# Patient Record
Sex: FEMALE | Race: WHITE | NOT HISPANIC OR LATINO | Employment: UNEMPLOYED
[De-identification: names, ages, dates, MRNs, and addresses within clinical notes are randomized per-mention and may not be internally consistent; named-entity substitution may affect disease eponyms.]

---

## 2017-09-01 ENCOUNTER — HEALTH MAINTENANCE LETTER (OUTPATIENT)
Age: 52
End: 2017-09-01

## 2018-03-13 ENCOUNTER — OFFICE VISIT (OUTPATIENT)
Dept: FAMILY MEDICINE | Facility: CLINIC | Age: 53
End: 2018-03-13
Payer: COMMERCIAL

## 2018-03-13 VITALS
SYSTOLIC BLOOD PRESSURE: 130 MMHG | DIASTOLIC BLOOD PRESSURE: 82 MMHG | BODY MASS INDEX: 32.14 KG/M2 | HEART RATE: 79 BPM | RESPIRATION RATE: 14 BRPM | OXYGEN SATURATION: 99 % | HEIGHT: 66 IN | TEMPERATURE: 97 F | WEIGHT: 200 LBS

## 2018-03-13 DIAGNOSIS — Z86.2 HISTORY OF IRON DEFICIENCY ANEMIA: ICD-10-CM

## 2018-03-13 DIAGNOSIS — Z80.0 FAMILY HISTORY OF COLON CANCER: ICD-10-CM

## 2018-03-13 DIAGNOSIS — Z00.00 ROUTINE GENERAL MEDICAL EXAMINATION AT A HEALTH CARE FACILITY: Primary | ICD-10-CM

## 2018-03-13 DIAGNOSIS — Z12.4 SCREENING FOR MALIGNANT NEOPLASM OF CERVIX: ICD-10-CM

## 2018-03-13 DIAGNOSIS — F17.200 TOBACCO USE DISORDER: ICD-10-CM

## 2018-03-13 DIAGNOSIS — Z12.11 SCREEN FOR COLON CANCER: ICD-10-CM

## 2018-03-13 DIAGNOSIS — F10.10 ALCOHOL ABUSE, EPISODIC DRINKING BEHAVIOR: ICD-10-CM

## 2018-03-13 DIAGNOSIS — Z23 NEED FOR PROPHYLACTIC VACCINATION AND INOCULATION AGAINST INFLUENZA: ICD-10-CM

## 2018-03-13 DIAGNOSIS — Z12.31 VISIT FOR SCREENING MAMMOGRAM: ICD-10-CM

## 2018-03-13 LAB
CHOLEST SERPL-MCNC: 257 MG/DL
FERRITIN SERPL-MCNC: 106 NG/ML (ref 8–252)
GLUCOSE SERPL-MCNC: 98 MG/DL (ref 70–99)
HDLC SERPL-MCNC: 56 MG/DL
LDLC SERPL CALC-MCNC: 164 MG/DL
NONHDLC SERPL-MCNC: 201 MG/DL
TRIGL SERPL-MCNC: 183 MG/DL

## 2018-03-13 PROCEDURE — 80061 LIPID PANEL: CPT | Performed by: FAMILY MEDICINE

## 2018-03-13 PROCEDURE — G0145 SCR C/V CYTO,THINLAYER,RESCR: HCPCS | Performed by: FAMILY MEDICINE

## 2018-03-13 PROCEDURE — 82947 ASSAY GLUCOSE BLOOD QUANT: CPT | Performed by: FAMILY MEDICINE

## 2018-03-13 PROCEDURE — 99396 PREV VISIT EST AGE 40-64: CPT | Performed by: FAMILY MEDICINE

## 2018-03-13 PROCEDURE — 90471 IMMUNIZATION ADMIN: CPT | Performed by: FAMILY MEDICINE

## 2018-03-13 PROCEDURE — 82728 ASSAY OF FERRITIN: CPT | Performed by: FAMILY MEDICINE

## 2018-03-13 PROCEDURE — 90686 IIV4 VACC NO PRSV 0.5 ML IM: CPT | Performed by: FAMILY MEDICINE

## 2018-03-13 PROCEDURE — 36415 COLL VENOUS BLD VENIPUNCTURE: CPT | Performed by: FAMILY MEDICINE

## 2018-03-13 PROCEDURE — G0476 HPV COMBO ASSAY CA SCREEN: HCPCS | Performed by: FAMILY MEDICINE

## 2018-03-13 PROCEDURE — 87624 HPV HI-RISK TYP POOLED RSLT: CPT | Performed by: FAMILY MEDICINE

## 2018-03-13 NOTE — PROGRESS NOTES

## 2018-03-13 NOTE — PROGRESS NOTES
SUBJECTIVE:   CC: Demetria Goodrich is an 52 year old woman who presents for preventive health visit.     Physical   Annual:     Getting at least 3 servings of Calcium per day::  Yes    Bi-annual eye exam::  NO    Dental care twice a year::  NO    Sleep apnea or symptoms of sleep apnea::  None    Frequency of exercise::  2-3 days/week    Duration of exercise::  30-45 minutes    Taking medications regularly::  Yes    Medication side effects::  None    Additional concerns today::  No              Letter stating that patient does not take Iron pills, so she and donate plasma and or blood    Today's PHQ-2 Score:   PHQ-2 ( 1999 [employer]) 3/13/2018   Q1: Little interest or pleasure in doing things 0   Q2: Feeling down, depressed or hopeless 0   PHQ-2 Score 0   Q1: Little interest or pleasure in doing things Not at all   Q2: Feeling down, depressed or hopeless Not at all   PHQ-2 Score 0       Abuse: Current or Past(Physical, Sexual or Emotional)- No  Do you feel safe in your environment - Yes    Social History   Substance Use Topics     Smoking status: Current Every Day Smoker     Packs/day: 0.30     Years: 33.00     Types: Cigarettes, Other     Smokeless tobacco: Former User     Quit date: 3/13/2015      Comment: 1 to 2 Cigarettes per day     Alcohol use Yes      Comment: 12 pack per week     Alcohol Use 3/13/2018   AUDIT SCORE  11     AUDIT - Alcohol Use Disorders Identification Test - Reproduced from the World Health Organization Audit 2001 (Second Edition) 3/13/2018   1.  How often do you have a drink containing alcohol? 2 to 4 times a month   2.  How many drinks containing alcohol do you have on a typical day when you are drinking? 5 or 6   3.  How often do you have five or more drinks on one occasion? Monthly   4.  How often during the last year have you found that you were not able to stop drinking once you had started? Never   5.  How often during the last year have you failed to do what was normally expected of  you because of drinking? Less than monthly   6.  How often during the last year have you needed a first drink in the morning to get yourself going after a heavy drinking session? Never   7.  How often during the last year have you had a feeling of guilt or remorse after drinking? Less than monthly   8.  How often during the last year have you been unable to remember what happened the night before because of your drinking? Less than monthly   9.  Have you or someone else been injured because of your drinking? No   10. Has a relative, friend, doctor or other health care worker been concerned about your drinking or suggested you cut down? Yes, but not in the last year   TOTAL SCORE 11       Reviewed orders with patient.  Reviewed health maintenance and updated orders accordingly - Yes  Labs reviewed in EPIC  BP Readings from Last 3 Encounters:   18 130/82   13 113/74   12 138/85    Wt Readings from Last 3 Encounters:   18 200 lb (90.7 kg)   13 184 lb (83.5 kg)   12 173 lb (78.5 kg)                  Patient Active Problem List   Diagnosis     Other and unspecified alcohol dependence, unspecified drinking behavior     Hyperlipidemia LDL goal <160     Nutritional anemia     Family history of colon cancer     Chronic hypomanic disorder     Tobacco use disorder     ASCUS on Pap smear     Fibroids     Menorrhagia     Alcohol abuse, episodic drinking behavior     Past Surgical History:   Procedure Laterality Date     LIGATN/STRIP LONG & SHORT SAPHEN  +1995    both legs       Social History   Substance Use Topics     Smoking status: Current Every Day Smoker     Packs/day: 0.30     Years: 33.00     Types: Cigarettes, Other     Smokeless tobacco: Former User     Quit date: 3/13/2015      Comment: 1 to 2 Cigarettes per day     Alcohol use Yes      Comment: 12 pack per week     Family History   Problem Relation Age of Onset     CANCER Father      Brain tumor, age 32,  age 33     Cancer -  colorectal Mother      54     Other Cancer Mother      Cancer - colorectal Maternal Uncle      56     Breast Cancer No family hx of      Ovarian Cancer No family hx of          No current outpatient prescriptions on file.     Allergies   Allergen Reactions     No Known Drug Allergies      Recent Labs   Lab Test  09/28/12   1059  10/01/11   1759  03/10/11   0937  03/16/10   1001   LDL  184*   --   163*  173*   HDL  53   --   52  58   TRIG  69   --   72  84   ALT  21   --   16  10   CR  0.77  0.72  0.81  0.74   GFRESTIMATED  81  88  77  86   GFRESTBLACK  >90  >90  >90  >90   POTASSIUM  4.4  3.8  4.1  3.9   TSH  1.37   --    --   3.05        Patient over age 50, mutual decision to screen reflected in health maintenance.    Pertinent mammograms are reviewed under the imaging tab.  History of abnormal Pap smear: YES - updated in Problem List and Health Maintenance accordingly    Reviewed and updated as needed this visit by clinical staff  Tobacco  Allergies  Meds  Problems  Med Hx  Surg Hx  Fam Hx  Soc Hx          Reviewed and updated as needed this visit by Provider  Tobacco  Allergies  Meds  Problems  Med Hx  Surg Hx  Soc Hx       Past Medical History:   Diagnosis Date     ASCUS on Pap smear 2010     Chronic hypomanic disorder      Other and unspecified alcohol dependence, unspecified drinking behavior      Varicose veins of lower extremities with other complications     both legs      Past Surgical History:   Procedure Laterality Date     LIGATN/STRIP LONG & SHORT SAPHEN  1990+1995    both legs       Review of Systems  C: NEGATIVE for fever, chills, change in weight  I: NEGATIVE for worrisome rashes, moles or lesions  E: NEGATIVE for vision changes or irritation  ENT: NEGATIVE for ear, mouth and throat problems  R: NEGATIVE for significant cough or SOB  B: NEGATIVE for masses, tenderness or discharge  CV: NEGATIVE for chest pain, palpitations or peripheral edema  GI: NEGATIVE for nausea, abdominal pain,  "heartburn, or change in bowel habits  : NEGATIVE for unusual urinary or vaginal symptoms. No vaginal bleeding.  M: NEGATIVE for significant arthralgias or myalgia  N: NEGATIVE for weakness, dizziness or paresthesias  P: NEGATIVE for changes in mood or affect      OBJECTIVE:   /82  Pulse 79  Temp 97  F (36.1  C)  Resp 14  Ht 5' 6\" (1.676 m)  Wt 200 lb (90.7 kg)  LMP 11/30/2012  SpO2 99%  BMI 32.28 kg/m2  Physical Exam  GENERAL APPEARANCE: healthy, alert and no distress  EYES: Eyes grossly normal to inspection, PERRL and conjunctivae and sclerae normal  HENT: ear canals and TM's normal, nose and mouth without ulcers or lesions, oropharynx clear and oral mucous membranes moist  NECK: no adenopathy, no asymmetry, masses, or scars and thyroid normal to palpation  RESP: lungs clear to auscultation - no rales, rhonchi or wheezes  BREAST: normal without masses, tenderness or nipple discharge and no palpable axillary masses or adenopathy  CV: regular rate and rhythm, normal S1 S2, no S3 or S4, no murmur, click or rub, no peripheral edema and peripheral pulses strong  ABDOMEN: soft, nontender, no hepatosplenomegaly, no masses and bowel sounds normal   (female): normal female external genitalia, normal urethral meatus, vaginal mucosal atrophy noted, normal cervix, adnexae, and uterus without masses or abnormal discharge  MS: no musculoskeletal defects are noted and gait is age appropriate without ataxia  SKIN: no suspicious lesions or rashes  NEURO: Normal strength and tone, sensory exam grossly normal, mentation intact and speech normal  PSYCH: mentation appears normal and affect normal/bright    ASSESSMENT/PLAN:   1. Routine general medical examination at a health care facility  Normal   - GLUCOSE  - Lipid panel reflex to direct LDL Fasting    2. Alcohol abuse, episodic drinking behavior  Discussed No more than  1-2 Beers a day    3. Tobacco use disorder  Pt says she is now on E-cigs and planning to " "quit    4. History of iron deficiency anemia  In the past when she had menorrhagia  - Ferritin    5. Screen for colon cancer  Discussed Importance of colonoscopy due to her Family History colon ca  Pt says she will schedule  - GASTROENTEROLOGY ADULT REF PROCEDURE ONLY    6. Visit for screening mammogram  Advised   - MA SCREENING DIGITAL BILAT - Future  (s+30); Future    7. Screening for malignant neoplasm of cervix  done  - Pap imaged thin layer screen with HPV - recommended age 30 - 65 years (select HPV order below)  - HPV High Risk Types DNA Cervical    8. Family history of colon cancer  Referral  done for colonoscopy      COUNSELING:  Reviewed preventive health counseling, as reflected in patient instructions       Regular exercise       Healthy diet/nutrition       Vision screening       Hearing screening       Immunizations    Vaccinated for: Influenza             Osteoporosis Prevention/Bone Health       Colon cancer screening       The ASCVD Risk score (Hollistoncharli ABREU Jr, et al., 2013) failed to calculate for the following reasons:    Cannot find a previous HDL lab    Cannot find a previous total cholesterol lab         reports that she has been smoking Cigarettes and Other.  She has a 9.90 pack-year smoking history. She quit smokeless tobacco use about 3 years ago.  Tobacco Cessation Action Plan: Information offered: Patient not interested at this time  Estimated body mass index is 32.28 kg/(m^2) as calculated from the following:    Height as of this encounter: 5' 6\" (1.676 m).    Weight as of this encounter: 200 lb (90.7 kg).   Weight management plan: low madina diet/Exercise    Counseling Resources:  ATP IV Guidelines  Pooled Cohorts Equation Calculator  Breast Cancer Risk Calculator  FRAX Risk Assessment  ICSI Preventive Guidelines  Dietary Guidelines for Americans, 2010  USDA's MyPlate  ASA Prophylaxis  Lung CA Screening    Teresa Carrasquillo MD  [facility]  Answers for HPI/ROS submitted by the patient on " 3/13/2018   PHQ-2 Score: 0

## 2018-03-13 NOTE — PATIENT INSTRUCTIONS
[facility]    If you have any questions regarding to your visit please contact your care team:       Team Red:   Clinic Hours Telephone Number   Dr. Xenia Renteria  (pediatrics)  Ana María De La Fuente NP 7am-7pm  Monday - Thursday   7am-5pm  Fridays  [phone] (fax)    Hiwot RODRIGUEZ  [phone]   Urgent Care - [place] and [place] Monday-Friday  [place] - 11am-8pm  Saturday-Sunday  Both sites - 9am-5pm  [phone] - [facility]  [phone] - [place]       What options do I have for visits at the clinic other than the traditional office visit?  To expand how we care for you, many of our providers are utilizing electronic visits (e-visits) and telephone visits, when medically appropriate, for interactions with their patients rather than a visit in the clinic.   We also offer nurse visits for many medical concerns. Just like any other service, we will bill your insurance company for this type of visit based on time spent on the phone with your provider. Not all insurance companies cover these visits. Please check with your medical insurance if this type of visit is covered. You will be responsible for any charges that are not paid by your insurance.      E-visits via EoeMobile:  generally incur a $35.00 fee.  Telephone visits:  Time spent on the phone: *charged based on time that is spent on the phone in increments of 10 minutes. Estimated cost:   5-10 mins $30.00   11-20 mins. $59.00   21-30 mins. $85.00     As always, Thank you for trusting us with your health care needs!                Preventive Health Recommendations  Female Ages 50 - 64    Yearly exam: See your health care provider every year in order to  o Review health changes.   o Discuss preventive care.    o Review your medicines if your doctor has prescribed any.      Get a Pap test every three years (unless you have an abnormal result and your provider advises testing more  often).    If you get Pap tests with HPV test, you only need to test every 5 years, unless you have an abnormal result.     You do not need a Pap test if your uterus was removed (hysterectomy) and you have not had cancer.    You should be tested each year for STDs (sexually transmitted diseases) if you're at risk.     Have a mammogram every 1 to 2 years.    Have a colonoscopy at age 50, or have a yearly FIT test (stool test). These exams screen for colon cancer.      Have a cholesterol test every 5 years, or more often if advised.    Have a diabetes test (fasting glucose) every three years. If you are at risk for diabetes, you should have this test more often.     If you are at risk for osteoporosis (brittle bone disease), think about having a bone density scan (DEXA).    Shots: Get a flu shot each year. Get a tetanus shot every 10 years.    Nutrition:     Eat at least 5 servings of fruits and vegetables each day.    Eat whole-grain bread, whole-wheat pasta and brown rice instead of white grains and rice.    Talk to your provider about Calcium and Vitamin D.     Lifestyle    Exercise at least 150 minutes a week (30 minutes a day, 5 days a week). This will help you control your weight and prevent disease.    Limit alcohol to one drink per day.    No smoking.     Wear sunscreen to prevent skin cancer.     See your dentist every six months for an exam and cleaning.    See your eye doctor every 1 to 2 years.        Please schedule Mammogram and Colonoscopy.  It is very Important that you get these test done  Take care  Teresa Carrasquillo MD    Discharge DILLAN Riggs CMA

## 2018-03-13 NOTE — LETTER
[facility]  [address] [address]    March 14, 2018    Demetria Goodrich  [address] [address]      Dear Demetria,    Iron level is normal. Your cholesterol is high. I am sending a low cholesterol diet. Exercise. Repeat test in 6 months.    Enclosed is a copy of your results.  Results for orders placed or performed in visit on 03/13/18   GLUCOSE   Result Value Ref Range    Glucose 98 70 - 99 mg/dL   Lipid panel reflex to direct LDL Fasting   Result Value Ref Range    Cholesterol 257 (H) <200 mg/dL    Triglycerides 183 (H) <150 mg/dL    HDL Cholesterol 56 >49 mg/dL    LDL Cholesterol Calculated 164 (H) <100 mg/dL    Non HDL Cholesterol 201 (H) <130 mg/dL   Ferritin   Result Value Ref Range    Ferritin 106 8 - 252 ng/mL   If you have any questions or concerns, please call myself or my nurse at [phone].      Sincerely,        Teresa Carrasquillo MD/cleopatra

## 2018-03-13 NOTE — NURSING NOTE
"Chief Complaint   Patient presents with     Physical       Initial /82  Pulse 79  Temp 97  F (36.1  C)  Resp 14  Ht 5' 6\" (1.676 m)  Wt 200 lb (90.7 kg)  LMP 11/30/2012  SpO2 99%  BMI 32.28 kg/m2 Estimated body mass index is 32.28 kg/(m^2) as calculated from the following:    Height as of this encounter: 5' 6\" (1.676 m).    Weight as of this encounter: 200 lb (90.7 kg).  Medication Reconciliation: complete     Lashaun Riggs. MA      "

## 2018-03-13 NOTE — MR AVS SNAPSHOT
After Visit Summary   3/13/2018    Demetria Goodrich    MRN: [id number]           Patient Information     Date Of Birth          1965        Visit Information        Provider Department      3/13/2018 12:10 PM Teresa Carrasquillo MD [facility]        Today's Diagnoses     History of iron deficiency anemia    -  1    Routine general medical examination at a health care facility        Screen for colon cancer        Visit for screening mammogram        Screening for malignant neoplasm of cervix        Family history of colon cancer        Tobacco use disorder          Care Instructions    [facility]    If you have any questions regarding to your visit please contact your care team:       Team Red:   Clinic Hours Telephone Number   Dr. Xenia Renteria  (pediatrics)  Ana María De La Fuente NP 7am-7pm  Monday - Thursday   7am-5pm  Fridays  [phone] (fax)    Hiowt RODRIGUEZ  [phone]   Urgent Care - [place] and [place] Monday-Friday  [place] - 11am-8pm  Saturday-Sunday  Both sites - 9am-5pm  [phone] - [facility]  [phone] - [place]       What options do I have for visits at the clinic other than the traditional office visit?  To expand how we care for you, many of our providers are utilizing electronic visits (e-visits) and telephone visits, when medically appropriate, for interactions with their patients rather than a visit in the clinic.   We also offer nurse visits for many medical concerns. Just like any other service, we will bill your insurance company for this type of visit based on time spent on the phone with your provider. Not all insurance companies cover these visits. Please check with your medical insurance if this type of visit is covered. You will be responsible for any charges that are not paid by your insurance.      E-visits via [employer]:  generally incur a $35.00 fee.  Telephone visits:  Time  spent on the phone: *charged based on time that is spent on the phone in increments of 10 minutes. Estimated cost:   5-10 mins $30.00   11-20 mins. $59.00   21-30 mins. $85.00     As always, Thank you for trusting us with your health care needs!                Preventive Health Recommendations  Female Ages 50 - 64    Yearly exam: See your health care provider every year in order to  o Review health changes.   o Discuss preventive care.    o Review your medicines if your doctor has prescribed any.      Get a Pap test every three years (unless you have an abnormal result and your provider advises testing more often).    If you get Pap tests with HPV test, you only need to test every 5 years, unless you have an abnormal result.     You do not need a Pap test if your uterus was removed (hysterectomy) and you have not had cancer.    You should be tested each year for STDs (sexually transmitted diseases) if you're at risk.     Have a mammogram every 1 to 2 years.    Have a colonoscopy at age 50, or have a yearly FIT test (stool test). These exams screen for colon cancer.      Have a cholesterol test every 5 years, or more often if advised.    Have a diabetes test (fasting glucose) every three years. If you are at risk for diabetes, you should have this test more often.     If you are at risk for osteoporosis (brittle bone disease), think about having a bone density scan (DEXA).    Shots: Get a flu shot each year. Get a tetanus shot every 10 years.    Nutrition:     Eat at least 5 servings of fruits and vegetables each day.    Eat whole-grain bread, whole-wheat pasta and brown rice instead of white grains and rice.    Talk to your provider about Calcium and Vitamin D.     Lifestyle    Exercise at least 150 minutes a week (30 minutes a day, 5 days a week). This will help you control your weight and prevent disease.    Limit alcohol to one drink per day.    No smoking.     Wear sunscreen to prevent skin cancer.     See your  dentist every six months for an exam and cleaning.    See your eye doctor every 1 to 2 years.        Please schedule Mammogram and Colonoscopy.  It is very Important that you get these test done  Take care  Teresa Riggs CMA            Follow-ups after your visit        Additional Services     GASTROENTEROLOGY ADULT REF PROCEDURE ONLY       Last Lab Result: Creatinine (mg/dL)       Date                     Value                 09/28/2012               0.77             ----------  Body mass index is 32.28 kg/(m^2).     Needed:  No  Language:  English    Patient will be contacted to schedule procedure.     Please be aware that coverage of these services is subject to the terms and limitations of your health insurance plan.  Call member services at your health plan with any benefit or coverage questions.  Any procedures must be performed at a [facility] facility OR coordinated by your clinic's referral office.    Please bring the following with you to your appointment:    (1) Any X-Rays, CTs or MRIs which have been performed.  Contact the facility where they were done to arrange for  prior to your scheduled appointment.    (2) List of current medications   (3) This referral request   (4) Any documents/labs given to you for this referral                  Future tests that were ordered for you today     Open Future Orders        Priority Expected Expires Ordered    MA SCREENING DIGITAL BILAT - Future  (s+30) Routine  3/13/2019 3/13/2018            Who to contact     If you have questions or need follow up information about today's clinic visit or your schedule please contact [facility] directly at [phone].  Normal or non-critical lab and imaging results will be communicated to you by MyChart, letter or phone within 4 business days after the clinic has received the results. If you do not hear from us within 7 days, please contact the clinic through  "[employer] or phone. If you have a critical or abnormal lab result, we will notify you by phone as soon as possible.  Submit refill requests through ZhongS[employer] or call your pharmacy and they will forward the refill request to us. Please allow 3 business days for your refill to be completed.          Additional Information About Your Visit        [employer] [facility] Medical Information     ZhongSou lets you send messages to your doctor, view your test results, renew your prescriptions, schedule appointments and more. To sign up, go to [email or web address] . Click on \"Log in\" on the left side of the screen, which will take you to the Welcome page. Then click on \"Sign up Now\" on the right side of the page.     You will be asked to enter the access code listed below, as well as some personal information. Please follow the directions to create your username and password.     Your access code is: [email or web address]  Expires: 2018 12:41 PM     Your access code will  in 90 days. If you need help or a new code, please call your [facility] clinic or [phone].        Care EveryWhere ID     This is your Care EveryWhere ID. This could be used by other organizations to access your [facility] medical records  [id number]        Your Vitals Were     Pulse Temperature Respirations Height Last Period Pulse Oximetry    79 97  F (36.1  C) 14 5' 6\" (1.676 m) 2012 99%    BMI (Body Mass Index)                   32.28 kg/m2            Blood Pressure from Last 3 Encounters:   18 130/82   13 113/74   12 138/85    Weight from Last 3 Encounters:   18 200 lb (90.7 kg)   13 184 lb (83.5 kg)   12 173 lb (78.5 kg)              We Performed the Following     Ferritin     GASTROENTEROLOGY ADULT REF PROCEDURE ONLY     GLUCOSE     HPV High Risk Types DNA Cervical     Lipid panel reflex to direct LDL Fasting     Pap imaged thin layer screen with HPV - recommended age 30 - 65 years (select HPV order below)        Primary Care " Provider Office Phone # Fax     Melecio Barreto Harjinder Goodrich [phone] [address] [address]        Equal Access to Services     NIKOS CHIU : Hadii aad ku hadlarryo Soomaali, waaxda luqadaha, qaybta kaalmada adeegyada, obey cruz laAlissonnora raffaele. So [facility] [phone].    ATENCIÓN: Si habla español, tiene a tabares disposición servicios gratuitos de asistencia lingüística. Llame al [phone].    We comply with applicable federal civil rights laws and [place] laws. We do not discriminate on the basis of race, color, national origin, age, disability, sex, sexual orientation, or gender identity.            Thank you!     Thank you for choosing [facility]  for your care. Our goal is always to provide you with excellent care. Hearing back from our patients is one way we can continue to improve our services. Please take a few minutes to complete the written survey that you may receive in the mail after your visit with us. Thank you!             Your Updated Medication List - Protect others around you: Learn how to safely use, store and throw away your medicines at www.disposemymeds.org.      Notice  As of 3/13/2018 12:41 PM    You have not been prescribed any medications.

## 2018-03-15 LAB
COPATH REPORT: NORMAL
PAP: NORMAL

## 2018-03-19 LAB
FINAL DIAGNOSIS: NORMAL
HPV HR 12 DNA CVX QL NAA+PROBE: NEGATIVE
HPV16 DNA SPEC QL NAA+PROBE: NEGATIVE
HPV18 DNA SPEC QL NAA+PROBE: NEGATIVE
SPECIMEN DESCRIPTION: NORMAL
SPECIMEN SOURCE CVX/VAG CYTO: NORMAL

## 2018-04-13 ENCOUNTER — RADIANT APPOINTMENT (OUTPATIENT)
Dept: MAMMOGRAPHY | Facility: CLINIC | Age: 53
End: 2018-04-13
Attending: FAMILY MEDICINE
Payer: COMMERCIAL

## 2018-04-13 DIAGNOSIS — Z12.31 VISIT FOR SCREENING MAMMOGRAM: ICD-10-CM

## 2018-04-13 PROCEDURE — 77067 SCR MAMMO BI INCL CAD: CPT | Mod: TC

## 2018-06-21 ENCOUNTER — NURSE TRIAGE (OUTPATIENT)
Dept: NURSING | Facility: CLINIC | Age: 53
End: 2018-06-21

## 2018-06-21 NOTE — TELEPHONE ENCOUNTER
Additional Information    Negative: Severe difficulty breathing (e.g., struggling for each breath, speaks in single words)    Negative: Sounds like a life-threatening emergency to the triager    Negative: Runny nose is caused by pollen or other allergies    Negative: Cough is main symptom    Negative: Severe sore throat    Negative: Fever > 104 F (40 C)    Negative: [1] Difficulty breathing AND [2] not severe AND [3] not from stuffy nose (e.g., not relieved by cleaning out the nose)    Negative: Patient sounds very sick or weak to the triager    Negative: [1] Fever > 101 F (38.3 C) AND [2] age > 60    Negative: [1] Fever > 101 F (38.3 C) AND [2] bedridden (e.g., nursing home patient, CVA, chronic illness, recovering from surgery)    Negative: [1] Fever > 100.5 F (38.1 C) AND [2] diabetes mellitus or weak immune system (e.g., HIV positive, cancer chemo, splenectomy, organ transplant, chronic steroids)    Negative: Fever present > 3 days (72 hours)    Negative: [1] Fever returns after gone for over 24 hours AND [2] symptoms worse or not improved    Negative: [1] Sinus pain (not just congestion) AND [2] fever    Negative: Earache    Negative: [1] SEVERE sore throat AND [2] present > 24 hours    Negative: [1] Sinus congestion (pressure, fullness) AND [2] present > 10 days    Negative: [1] Nasal discharge AND [2] present > 10 days    Negative: [1] Using nasal washes and pain medicine > 24 hours AND [2] sinus pain (lower forehead, cheekbone, or eye) persists    Negative: Sores with yellow scabs around the nasal opening    Cold with no complications (all triage questions negative)    Care advice for mild cough, questions about    Protocols used: COMMON COLD-ADULT-

## 2018-06-22 ENCOUNTER — TELEPHONE (OUTPATIENT)
Dept: FAMILY MEDICINE | Facility: CLINIC | Age: 53
End: 2018-06-22

## 2018-06-22 NOTE — TELEPHONE ENCOUNTER
Spoke with pt. States she already spoke with a nurse yesterday and did not want to review symptoms again. States throat has been raw since Tuesday. Symptoms started on Sunday. Has tried gargling every hour and it is not helping. No fever. She is requesting an abx and wants this prescribed by a phone visit now. Pt's provider is not in clinic at this time. [facility]     Mayte Orozco RN  [facility]

## 2018-06-22 NOTE — TELEPHONE ENCOUNTER
C/o nasal congestion and PND starting 6/17 (4 days ago) and sore throat starting 6/18. Reports intermittent, non-productive cough and hoarse voice for past 2 days. No breathing or swallowing difficulty. Does have soreness with swallowing. No fever. Occasional ear pain only w/ swallowing. No asthma or COPD hx. No immune system problems. Triaged to home care per guideline. Discussed guideline home care advice including call back if new or worsened sx. Pt voiced understanding and agreement. Nimo Botello RN/FNA

## 2018-06-22 NOTE — TELEPHONE ENCOUNTER
Reason for call:  Patient reporting a symptom    Symptom or request: Sore throat, cold, stuffy nose    Duration (how long have symptoms been present): since sunday    Have you been treated for this before? No    Additional comments: call to advise    Phone Number patient can be reached at:  Home number on file [phone] (home)    Best Time:  any    Can we leave a detailed message on this number:  Not Applicable    Call taken on 6/22/2018 at 3:54 PM by Nory Noel

## 2018-11-12 ENCOUNTER — TELEPHONE (OUTPATIENT)
Dept: FAMILY MEDICINE | Facility: CLINIC | Age: 53
End: 2018-11-12

## 2018-11-12 NOTE — TELEPHONE ENCOUNTER
Panel Management Review      Patient has the following on her problem list: None      Composite cancer screening  Chart review shows that this patient is due/due soon for the following Colonoscopy  Summary:    Patient is due/failing the following:   COLONOSCOPY    Action needed:   Routed to provider for review.    Type of outreach:    None, routed to provider for review. and Sent [employer] message.    Questions for provider review:    Please do colonoscopy referral if appropriate                                                                                                                                    Jackson Malone CMA on 11/12/2018 at 2:06 PM       Chart routed to Provider .

## 2018-11-14 NOTE — TELEPHONE ENCOUNTER
Please call pt   Extremely important to get colonoscopy due to family history  Referral was done for last visit-Please advise her to schedule

## 2018-11-14 NOTE — TELEPHONE ENCOUNTER
Called patient and left VM to call clinic in regards to below message. Okay to speak to anyone on red team.  Mayte WIGGINS CMA ([facility])

## 2018-11-15 NOTE — TELEPHONE ENCOUNTER
2nd attempted. Called patient and left VM to call clinic in regards to below message. Okay to speak to anyone on red team.  Jackson Malone CMA on 11/15/2018 at 11:01 AM

## 2019-12-08 ENCOUNTER — HEALTH MAINTENANCE LETTER (OUTPATIENT)
Age: 54
End: 2019-12-08

## 2020-07-22 NOTE — PROGRESS NOTES
SUBJECTIVE:   CC: Demetria Goodrich is an 55 year old woman who presents for preventive health visit.     Healthy Habits:    Do you get at least three servings of calcium containing foods daily (dairy, green leafy vegetables, etc.)? yes    Amount of exercise or daily activities, outside of work: NONE    Problems taking medications regularly not applicable    Medication side effects: N/A    Have you had an eye exam in the past two years? no    Do you see a dentist twice per year? no    Do you have sleep apnea, excessive snoring or daytime drowsiness?no      -------------------------------------    Today's PHQ-2 Score:   PHQ-2 ( 1999 [employer]) 7/22/2020 3/13/2018   Q1: Little interest or pleasure in doing things 0 0   Q2: Feeling down, depressed or hopeless 0 0   PHQ-2 Score 0 0   Q1: Little interest or pleasure in doing things - Not at all   Q2: Feeling down, depressed or hopeless - Not at all   PHQ-2 Score - 0       Abuse: Current or Past(Physical, Sexual or Emotional)- No  Do you feel safe in your environment? Yes        Social History     Tobacco Use     Smoking status: Current Every Day Smoker     Packs/day: 0.30     Years: 33.00     Pack years: 9.90     Types: Cigarettes, Other     Smokeless tobacco: Former User     Quit date: 3/13/2015     Tobacco comment: 1 to 2 Cigarettes per day   Substance Use Topics     Alcohol use: Yes     Comment: 12 pack per week     If you drink alcohol do you typically have >3 drinks per day or >7 drinks per week? No                     Reviewed orders with patient.  Reviewed health maintenance and updated orders accordingly - Yes      Pertinent mammograms are reviewed under the imaging tab.  History of abnormal Pap smear: NO - age 30-65 PAP every 5 years with negative HPV co-testing recommended  PAP / HPV Latest Ref Rng & Units 3/13/2018 9/28/2012 3/10/2011   PAP - NIL NIL NIL   HPV 16 DNA NEG:Negative Negative - -   HPV 18 DNA NEG:Negative Negative - -   OTHER HR HPV NEG:Negative  "Negative - -     Reviewed and updated as needed this visit by clinical staff  Tobacco  Allergies  Meds  Med Hx  Surg Hx  Fam Hx  Soc Hx        Reviewed and updated as needed this visit by Provider        Patient has noticed a breakout every year during the initial sun exposure.  Itchiness.  PMHx of vein stripping x2. OTC compression hose recommended as the script is too tight.     ROS:  CONSTITUTIONAL: NEGATIVE for fever, chills, change in weight  INTEGUMENTARY/SKIN: NEGATIVE for worrisome rashes, moles or lesions  EYES: NEGATIVE for vision changes or irritation  ENT: NEGATIVE for ear, mouth and throat problems  RESP: NEGATIVE for significant cough or SOB  BREAST: NEGATIVE for masses, tenderness or discharge  CV: NEGATIVE for chest pain, palpitations or peripheral edema  GI: NEGATIVE for nausea, abdominal pain, heartburn, or change in bowel habits  : NEGATIVE for unusual urinary or vaginal symptoms. No vaginal bleeding.  MUSCULOSKELETAL: NEGATIVE for significant arthralgias or myalgia  NEURO: NEGATIVE for weakness, dizziness or paresthesias  PSYCHIATRIC: NEGATIVE for changes in mood or affect     OBJECTIVE:   /70   Pulse 87   Temp 97.7  F (36.5  C) (Temporal)   Resp 20   Ht 1.666 m (5' 5.59\")   Wt 81.4 kg (179 lb 6.4 oz)   LMP 11/30/2012   SpO2 99%   BMI 29.32 kg/m    EXAM:  GENERAL APPEARANCE: healthy, alert and no distress  EYES: Eyes grossly normal to inspection, PERRL and conjunctivae and sclerae normal  HENT: ear canals and TM's normal, nose and mouth without ulcers or lesions, oropharynx clear and oral mucous membranes moist  NECK: no adenopathy, no asymmetry, masses, or scars and thyroid normal to palpation  RESP: lungs clear to auscultation - no rales, rhonchi or wheezes  CV: regular rate and rhythm, normal S1 S2, no S3 or S4, no murmur, click or rub, no peripheral edema and peripheral pulses strong  ABDOMEN: soft, nontender, no hepatosplenomegaly, no masses and bowel sounds normal  MS: " "no musculoskeletal defects are noted and gait is age appropriate without ataxia  SKIN: no suspicious lesions or rashes  NEURO: Normal strength and tone, sensory exam grossly normal, mentation intact and speech normal  PSYCH: mentation appears normal and affect normal/bright    Diagnostic Test Results:  none     ASSESSMENT/PLAN:   1. Routine general medical examination at a health care facility  - MA SCREENING DIGITAL BILAT - Future  (s+30); Future  - Lipid panel reflex to direct LDL Fasting  - Basic metabolic panel  (Ca, Cl, CO2, Creat, Gluc, K, Na, BUN)    2. Screen for colon cancer  - GASTROENTEROLOGY ADULT REF PROCEDURE ONLY; Future    3. Polymorphic light eruption  - triamcinolone (KENALOG) 0.5 % external ointment; Apply 1 g topically 2 times daily  Dispense: 30 g; Refill: 1    4. Tobacco abuse  - varenicline (CHANTIX JULIO) 0.5 MG X 11 & 1 MG X 42 tablet; Take 0.5 mg tab daily for 3 days, THEN 0.5 mg tab twice daily for 4 days, THEN 1 mg twice daily.  Dispense: 53 tablet; Refill: 0    5. Asymptomatic varicose veins of both lower extremities  - OTC compression stockings.     COUNSELING:   Reviewed preventive health counseling, as reflected in patient instructions    Estimated body mass index is 29.32 kg/m  as calculated from the following:    Height as of this encounter: 1.666 m (5' 5.59\").    Weight as of this encounter: 81.4 kg (179 lb 6.4 oz).         reports that she has been smoking cigarettes and other. She has a 9.90 pack-year smoking history. She quit smokeless tobacco use about 5 years ago.  Tobacco Cessation Action Plan: Pharmacotherapies : Chantix    Counseling Resources:  ATP IV Guidelines  Pooled Cohorts Equation Calculator  Breast Cancer Risk Calculator  FRAX Risk Assessment  ICSI Preventive Guidelines  Dietary Guidelines for Americans, 2010  USDA's MyPlate  ASA Prophylaxis  Lung CA Screening    Conner Sandoval PA-C  [facility]  "

## 2020-07-23 ENCOUNTER — OFFICE VISIT (OUTPATIENT)
Dept: FAMILY MEDICINE | Facility: CLINIC | Age: 55
End: 2020-07-23
Payer: COMMERCIAL

## 2020-07-23 VITALS
RESPIRATION RATE: 20 BRPM | WEIGHT: 179.4 LBS | TEMPERATURE: 97.7 F | OXYGEN SATURATION: 99 % | DIASTOLIC BLOOD PRESSURE: 70 MMHG | BODY MASS INDEX: 28.83 KG/M2 | HEART RATE: 87 BPM | HEIGHT: 66 IN | SYSTOLIC BLOOD PRESSURE: 116 MMHG

## 2020-07-23 DIAGNOSIS — L56.4 POLYMORPHIC LIGHT ERUPTION: ICD-10-CM

## 2020-07-23 DIAGNOSIS — Z72.0 TOBACCO ABUSE: ICD-10-CM

## 2020-07-23 DIAGNOSIS — Z00.00 ROUTINE GENERAL MEDICAL EXAMINATION AT A HEALTH CARE FACILITY: Primary | ICD-10-CM

## 2020-07-23 DIAGNOSIS — I83.93 ASYMPTOMATIC VARICOSE VEINS OF BOTH LOWER EXTREMITIES: ICD-10-CM

## 2020-07-23 DIAGNOSIS — Z12.11 SCREEN FOR COLON CANCER: ICD-10-CM

## 2020-07-23 LAB
ANION GAP SERPL CALCULATED.3IONS-SCNC: 5 MMOL/L (ref 3–14)
BUN SERPL-MCNC: 11 MG/DL (ref 7–30)
CALCIUM SERPL-MCNC: 9.5 MG/DL (ref 8.5–10.1)
CHLORIDE SERPL-SCNC: 105 MMOL/L (ref 94–109)
CHOLEST SERPL-MCNC: 280 MG/DL
CO2 SERPL-SCNC: 28 MMOL/L (ref 20–32)
CREAT SERPL-MCNC: 0.94 MG/DL (ref 0.52–1.04)
GFR SERPL CREATININE-BSD FRML MDRD: 69 ML/MIN/{1.73_M2}
GLUCOSE SERPL-MCNC: 106 MG/DL (ref 70–99)
HDLC SERPL-MCNC: 50 MG/DL
LDLC SERPL CALC-MCNC: 210 MG/DL
NONHDLC SERPL-MCNC: 230 MG/DL
POTASSIUM SERPL-SCNC: 4.6 MMOL/L (ref 3.4–5.3)
SODIUM SERPL-SCNC: 138 MMOL/L (ref 133–144)
TRIGL SERPL-MCNC: 101 MG/DL

## 2020-07-23 PROCEDURE — 36415 COLL VENOUS BLD VENIPUNCTURE: CPT | Performed by: PHYSICIAN ASSISTANT

## 2020-07-23 PROCEDURE — 99396 PREV VISIT EST AGE 40-64: CPT | Performed by: PHYSICIAN ASSISTANT

## 2020-07-23 PROCEDURE — 80061 LIPID PANEL: CPT | Performed by: PHYSICIAN ASSISTANT

## 2020-07-23 PROCEDURE — 80048 BASIC METABOLIC PNL TOTAL CA: CPT | Performed by: PHYSICIAN ASSISTANT

## 2020-07-23 PROCEDURE — 99213 OFFICE O/P EST LOW 20 MIN: CPT | Mod: 25 | Performed by: PHYSICIAN ASSISTANT

## 2020-07-23 RX ORDER — TRIAMCINOLONE ACETONIDE 5 MG/G
1 OINTMENT TOPICAL 2 TIMES DAILY
Qty: 30 G | Refills: 1 | Status: SHIPPED | OUTPATIENT
Start: 2020-07-23 | End: 2021-01-01

## 2020-07-23 ASSESSMENT — MIFFLIN-ST. JEOR: SCORE: 1429.01

## 2020-07-24 ENCOUNTER — TELEPHONE (OUTPATIENT)
Dept: URGENT CARE | Facility: URGENT CARE | Age: 55
End: 2020-07-24

## 2020-07-30 ENCOUNTER — OFFICE VISIT (OUTPATIENT)
Dept: FAMILY MEDICINE | Facility: CLINIC | Age: 55
End: 2020-07-30
Payer: COMMERCIAL

## 2020-07-30 VITALS
HEIGHT: 66 IN | BODY MASS INDEX: 29.54 KG/M2 | SYSTOLIC BLOOD PRESSURE: 138 MMHG | OXYGEN SATURATION: 100 % | DIASTOLIC BLOOD PRESSURE: 80 MMHG | WEIGHT: 183.8 LBS | HEART RATE: 75 BPM | TEMPERATURE: 97.8 F

## 2020-07-30 DIAGNOSIS — R73.09 ELEVATED GLUCOSE: ICD-10-CM

## 2020-07-30 DIAGNOSIS — E78.5 HYPERLIPIDEMIA LDL GOAL <100: Primary | ICD-10-CM

## 2020-07-30 LAB
GLUCOSE SERPL-MCNC: 94 MG/DL (ref 70–99)
HBA1C MFR BLD: 5.3 % (ref 0–5.6)

## 2020-07-30 PROCEDURE — 83036 HEMOGLOBIN GLYCOSYLATED A1C: CPT | Performed by: PHYSICIAN ASSISTANT

## 2020-07-30 PROCEDURE — 36415 COLL VENOUS BLD VENIPUNCTURE: CPT | Performed by: PHYSICIAN ASSISTANT

## 2020-07-30 PROCEDURE — 82947 ASSAY GLUCOSE BLOOD QUANT: CPT | Performed by: PHYSICIAN ASSISTANT

## 2020-07-30 PROCEDURE — 99213 OFFICE O/P EST LOW 20 MIN: CPT | Performed by: PHYSICIAN ASSISTANT

## 2020-07-30 RX ORDER — SIMVASTATIN 40 MG
40 TABLET ORAL AT BEDTIME
Qty: 60 TABLET | Refills: 1 | Status: SHIPPED | OUTPATIENT
Start: 2020-07-30 | End: 2020-11-16

## 2020-07-30 ASSESSMENT — MIFFLIN-ST. JEOR: SCORE: 1448.95

## 2020-07-30 NOTE — PROGRESS NOTES
"Subjective     Demetria Goodrich is a 55 year old female who presents to clinic today for the following health issues:    HPI       Patient presents with:  Results: Lab Results done on 07/23/2020      Review of Systems   Constitutional, HEENT, cardiovascular, pulmonary, gi and gu systems are negative, except as otherwise noted.      Objective    /80   Pulse 75   Temp 97.8  F (36.6  C) (Tympanic)   Ht 1.666 m (5' 5.59\")   Wt 83.4 kg (183 lb 12.8 oz)   LMP 11/30/2012   SpO2 100%   BMI 30.04 kg/m      Total visit time is 20 Minutes with > 15 Minutes spent in care and consultation regarding Hyperlipidemia and hyperglycemia with labs, management and follow up plan.      Diagnostic Test Results:  Pending.        Assessment & Plan     1. Hyperlipidemia LDL goal <100  - simvastatin (ZOCOR) 40 MG tablet; Take 1 tablet (40 mg) by mouth At Bedtime  Dispense: 60 tablet; Refill: 1  - **ALT FUTURE anytime; Future    2. Elevated glucose  - Hemoglobin A1c  - Glucose       Return in about 4 weeks (around 8/27/2020) for Lab Work.    Conner Sandoval PA-C  [facility]  "

## 2020-07-30 NOTE — PATIENT INSTRUCTIONS
Patient Education     Lifestyle Changes to Control Cholesterol  You can control your cholesterol through diet, exercise, weight management, quitting smoking, stress management, and taking your medicines right. These things can also lower your risk for cardiovascular disease.    Eating healthy  Your healthcare provider will give you information on diet changes you may need to make. Your provider may recommend that you see a registered dietitian for help with diet changes. Changes may include:    Cutting back on the amount of fat and cholesterol in your meals    Eating less salt (sodium). This is especially important if you have high blood pressure.    Eating more fresh vegetables and fruits    Eating lean proteins such as fish, poultry, beans, and peas    Eating less red meat and processed meats    Using low-fat dairy products    Using vegetable and nut oils in limited amounts    Limiting how many sweets and processed foods like chips, cookies, and baked goods that you eat     Limiting how many sugar-sweetened beverages you drink    Limiting how often you eat out  Getting exercise  Regular exercise is a good way to help your body control cholesterol. Regular exercise can help in many ways. It can:    Raise your good cholesterol    Help lower your bad cholesterol    Let blood flow better through your body    Give more oxygen to your muscles and tissues    Help you manage your weight    Help your heart pump better    Lower your blood pressure  Your healthcare provider may recommend that you get more physical activity if you haven't been active. Your provider may recommend that you get moderate to vigorous physical activity for at least 40 minutes each day. You should do this for at least 3 to 4 days each week. A few examples of moderate to vigorous activity are:    Walking at a brisk pace. This is about 3 to 4 miles per hour.    Jogging or running    Swimming or water aerobics    [employer]  arts    Tennis    Riding a bicycle or stationary bike    Dancing  Managing your weight  If you are overweight or obese, your healthcare provider will work with you to help you lose weight and lower your BMI (body mass index). Making diet changes and getting more physical activity can help. Changing your diet will help you lose weight more easily than adding exercise.  Quitting smoking  Smoking and other tobacco use can raise cholesterol and make it harder to control. Quitting is tough. But millions of people have given up tobacco for good. You can quit, too! Think about some of the reasons below to quit smoking. Do any of them make you think twice about your smoking habit?  Stop smoking because it:    Keeps your cholesterol high, even if you make all the other changes you re supposed to    Damages your body. It especially harms your heart, lungs, skin, and blood vessels.    Makes you more likely to have a heart attack (acute myocardial infarction), stroke, or cancer    Stains your teeth    Makes your skin, clothes, and breath smell bad    Costs a lot of money  Controlling stress   Learn ways to control stress. This will help you deal with stress in your home and work life. Controlling stress can greatly lower your risk of getting cardiovascular disease.  Making the most of medicines  Healthy eating and exercise are a good start to keeping your cholesterol down. But you may need some extra help from medicine. If your doctor prescribes medicine, be sure to take it exactly as directed. Remember:    Tell your healthcare provider about all other medicines you take. This includes vitamins and herbs.    Tell your healthcare provider if you have any side effects after starting to take a medicine. Examples of side effects to watch for include muscle aches, weakness, blurred vision, rust-colored urine, yellowing of eyes or skin (jaundice), and headache.    Don t skip a dose or stop taking your medicine because you feel better  or because your cholesterol numbers go down. Never stop taking your medicine unless your healthcare provider has told you it s OK.    Ask your healthcare provider if you have any questions about your medicines.  High risk groups  Some people may need to take medicines called statins to control their cholesterol. This is in addition to eating a healthy diet and getting regular exercise.  Statins can help you stay healthy. They can also help prevent a heart attack or stroke. You may need to take a statin if you are in one of these groups:    Adults who have had a heart attack or stroke. Or adults who have had peripheral vascular disease, a ministroke (transient ischemic attack), or stable or unstable angina. This group also includes people who have had a procedure to restore blood flow through a blocked artery. These procedures include percutaneous coronary intervention, angioplasty, stent, and open-heart bypass surgery.    Adults who have diabetes. Or adults who are at higher risk of having a heart attack or stroke and have an LDL cholesterol level of 70 to 189 mg/dL    Adults who are 21 years old or older and have an LDL cholesterol level of 190 mg/dL or higher.  If you are in a high-risk group, talk with your healthcare provider about your treatment goals. Make sure you understand why these goals are important, based on your own health history and your family history of heart disease or high cholesterol.  Make a plan to have regular cholesterol checks. You may need to fast before getting this test. Also ask your provider about any side effects your medicines may cause. Let your provider know about any side effects you have. You may need to take more than one medicine to reach the cholesterol goals that you and your provider decide on.  Date Last Reviewed: 10/1/2016    8234-5979 [employer]. [address] [place] [address], [place] [address]. All rights reserved. This information is not intended as a substitute  for professional medical care. Always follow your healthcare professional's instructions.

## 2020-08-27 ENCOUNTER — ANCILLARY PROCEDURE (OUTPATIENT)
Dept: MAMMOGRAPHY | Facility: CLINIC | Age: 55
End: 2020-08-27
Attending: PHYSICIAN ASSISTANT
Payer: COMMERCIAL

## 2020-08-27 DIAGNOSIS — Z12.31 SCREENING MAMMOGRAM FOR HIGH-RISK PATIENT: ICD-10-CM

## 2020-08-27 PROCEDURE — 77067 SCR MAMMO BI INCL CAD: CPT | Mod: TC

## 2020-08-31 DIAGNOSIS — E78.5 HYPERLIPIDEMIA LDL GOAL <100: ICD-10-CM

## 2020-08-31 LAB — ALT SERPL W P-5'-P-CCNC: 33 U/L (ref 0–50)

## 2020-08-31 PROCEDURE — 36415 COLL VENOUS BLD VENIPUNCTURE: CPT | Performed by: PHYSICIAN ASSISTANT

## 2020-08-31 PROCEDURE — 84460 ALANINE AMINO (ALT) (SGPT): CPT | Performed by: PHYSICIAN ASSISTANT

## 2020-11-15 DIAGNOSIS — E78.5 HYPERLIPIDEMIA LDL GOAL <100: ICD-10-CM

## 2020-11-16 RX ORDER — SIMVASTATIN 40 MG
TABLET ORAL
Qty: 90 TABLET | Refills: 2 | Status: SHIPPED | OUTPATIENT
Start: 2020-11-16 | End: 2021-01-01

## 2020-11-25 ENCOUNTER — MYC REFILL (OUTPATIENT)
Dept: FAMILY MEDICINE | Facility: CLINIC | Age: 55
End: 2020-11-25

## 2020-11-25 DIAGNOSIS — Z72.0 TOBACCO ABUSE: ICD-10-CM

## 2020-11-25 PROBLEM — F10.10 ALCOHOL ABUSE, EPISODIC DRINKING BEHAVIOR: Status: RESOLVED | Noted: 2018-03-13 | Resolved: 2020-11-25

## 2020-11-25 RX ORDER — VARENICLINE TARTRATE 1 MG/1
1 TABLET, FILM COATED ORAL 2 TIMES DAILY
Start: 2020-11-25

## 2020-12-10 ENCOUNTER — MYC MEDICAL ADVICE (OUTPATIENT)
Dept: FAMILY MEDICINE | Facility: CLINIC | Age: 55
End: 2020-12-10

## 2020-12-10 DIAGNOSIS — Z72.0 TOBACCO ABUSE: ICD-10-CM

## 2020-12-11 NOTE — TELEPHONE ENCOUNTER
Routing refill request to provider for review/approval because:  A break in medication - Pt requesting new start with starter pack due to first attempt not successful per pt.    Mayte Peraza, LAKISHAN, RN

## 2021-01-01 ENCOUNTER — HEALTH MAINTENANCE LETTER (OUTPATIENT)
Age: 56
End: 2021-01-01

## 2021-01-01 DIAGNOSIS — L56.4 POLYMORPHIC LIGHT ERUPTION: ICD-10-CM

## 2021-01-01 DIAGNOSIS — E78.5 HYPERLIPIDEMIA LDL GOAL <100: ICD-10-CM

## 2021-01-01 RX ORDER — TRIAMCINOLONE ACETONIDE 5 MG/G
OINTMENT TOPICAL
Qty: 30 G | Refills: 1 | Status: SHIPPED | OUTPATIENT
Start: 2021-01-01 | End: 2022-01-01

## 2021-01-01 RX ORDER — SIMVASTATIN 40 MG
TABLET ORAL
Qty: 90 TABLET | Refills: 2 | Status: SHIPPED | OUTPATIENT
Start: 2021-01-01 | End: 2022-01-01

## 2021-01-09 ENCOUNTER — HEALTH MAINTENANCE LETTER (OUTPATIENT)
Age: 56
End: 2021-01-09

## 2021-04-16 ENCOUNTER — IMMUNIZATION (OUTPATIENT)
Dept: NURSING | Facility: CLINIC | Age: 56
End: 2021-04-16
Payer: COMMERCIAL

## 2021-04-16 PROCEDURE — 91300 PR COVID VAC PFIZER DIL RECON 30 MCG/0.3 ML IM: CPT

## 2021-04-16 PROCEDURE — 0001A PR COVID VAC PFIZER DIL RECON 30 MCG/0.3 ML IM: CPT

## 2021-05-07 ENCOUNTER — IMMUNIZATION (OUTPATIENT)
Dept: NURSING | Facility: CLINIC | Age: 56
End: 2021-05-07
Attending: INTERNAL MEDICINE
Payer: COMMERCIAL

## 2021-05-07 PROCEDURE — 91300 PR COVID VAC PFIZER DIL RECON 30 MCG/0.3 ML IM: CPT

## 2021-05-07 PROCEDURE — 0002A PR COVID VAC PFIZER DIL RECON 30 MCG/0.3 ML IM: CPT

## 2022-01-01 ENCOUNTER — TUMOR CONFERENCE (OUTPATIENT)
Dept: ONCOLOGY | Facility: CLINIC | Age: 57
End: 2022-01-01
Payer: COMMERCIAL

## 2022-01-01 ENCOUNTER — APPOINTMENT (OUTPATIENT)
Dept: GENERAL RADIOLOGY | Facility: CLINIC | Age: 57
End: 2022-01-01
Payer: COMMERCIAL

## 2022-01-01 ENCOUNTER — TELEPHONE (OUTPATIENT)
Facility: CLINIC | Age: 57
End: 2022-01-01
Payer: COMMERCIAL

## 2022-01-01 ENCOUNTER — MYC MEDICAL ADVICE (OUTPATIENT)
Dept: FAMILY MEDICINE | Facility: CLINIC | Age: 57
End: 2022-01-01
Payer: COMMERCIAL

## 2022-01-01 ENCOUNTER — TELEPHONE (OUTPATIENT)
Dept: FAMILY MEDICINE | Facility: CLINIC | Age: 57
End: 2022-01-01

## 2022-01-01 ENCOUNTER — PATIENT OUTREACH (OUTPATIENT)
Dept: FAMILY MEDICINE | Facility: CLINIC | Age: 57
End: 2022-01-01

## 2022-01-01 ENCOUNTER — OFFICE VISIT (OUTPATIENT)
Dept: FAMILY MEDICINE | Facility: CLINIC | Age: 57
End: 2022-01-01
Payer: COMMERCIAL

## 2022-01-01 ENCOUNTER — PATIENT OUTREACH (OUTPATIENT)
Dept: ONCOLOGY | Facility: CLINIC | Age: 57
End: 2022-01-01
Payer: COMMERCIAL

## 2022-01-01 ENCOUNTER — APPOINTMENT (OUTPATIENT)
Dept: GENERAL RADIOLOGY | Facility: CLINIC | Age: 57
End: 2022-01-01
Attending: EMERGENCY MEDICINE
Payer: COMMERCIAL

## 2022-01-01 ENCOUNTER — LAB (OUTPATIENT)
Dept: LAB | Facility: CLINIC | Age: 57
End: 2022-01-01
Payer: COMMERCIAL

## 2022-01-01 ENCOUNTER — TELEPHONE (OUTPATIENT)
Dept: GASTROENTEROLOGY | Facility: CLINIC | Age: 57
End: 2022-01-01
Payer: COMMERCIAL

## 2022-01-01 ENCOUNTER — LAB (OUTPATIENT)
Dept: LAB | Facility: CLINIC | Age: 57
End: 2022-01-01

## 2022-01-01 ENCOUNTER — ANCILLARY PROCEDURE (OUTPATIENT)
Dept: CT IMAGING | Facility: CLINIC | Age: 57
End: 2022-01-01
Attending: INTERNAL MEDICINE
Payer: COMMERCIAL

## 2022-01-01 ENCOUNTER — APPOINTMENT (OUTPATIENT)
Dept: CT IMAGING | Facility: CLINIC | Age: 57
End: 2022-01-01
Attending: PHYSICIAN ASSISTANT
Payer: COMMERCIAL

## 2022-01-01 ENCOUNTER — PRE VISIT (OUTPATIENT)
Dept: SURGERY | Facility: CLINIC | Age: 57
End: 2022-01-01
Payer: COMMERCIAL

## 2022-01-01 ENCOUNTER — THERAPY VISIT (OUTPATIENT)
Dept: PHYSICAL THERAPY | Facility: CLINIC | Age: 57
End: 2022-01-01
Payer: COMMERCIAL

## 2022-01-01 ENCOUNTER — TELEPHONE (OUTPATIENT)
Dept: INTENSIVE CARE | Facility: CLINIC | Age: 57
End: 2022-01-01
Payer: COMMERCIAL

## 2022-01-01 ENCOUNTER — TELEPHONE (OUTPATIENT)
Dept: FAMILY MEDICINE | Facility: CLINIC | Age: 57
End: 2022-01-01
Payer: COMMERCIAL

## 2022-01-01 ENCOUNTER — APPOINTMENT (OUTPATIENT)
Dept: OCCUPATIONAL THERAPY | Facility: CLINIC | Age: 57
End: 2022-01-01
Attending: PHYSICIAN ASSISTANT
Payer: COMMERCIAL

## 2022-01-01 ENCOUNTER — VIRTUAL VISIT (OUTPATIENT)
Dept: ONCOLOGY | Facility: CLINIC | Age: 57
End: 2022-01-01
Attending: STUDENT IN AN ORGANIZED HEALTH CARE EDUCATION/TRAINING PROGRAM
Payer: COMMERCIAL

## 2022-01-01 ENCOUNTER — HEALTH MAINTENANCE LETTER (OUTPATIENT)
Age: 57
End: 2022-01-01

## 2022-01-01 ENCOUNTER — PATIENT OUTREACH (OUTPATIENT)
Dept: ONCOLOGY | Facility: CLINIC | Age: 57
End: 2022-01-01

## 2022-01-01 ENCOUNTER — APPOINTMENT (OUTPATIENT)
Dept: ULTRASOUND IMAGING | Facility: CLINIC | Age: 57
End: 2022-01-01
Attending: EMERGENCY MEDICINE
Payer: COMMERCIAL

## 2022-01-01 ENCOUNTER — ANCILLARY PROCEDURE (OUTPATIENT)
Dept: MRI IMAGING | Facility: CLINIC | Age: 57
End: 2022-01-01
Attending: SURGERY
Payer: COMMERCIAL

## 2022-01-01 ENCOUNTER — HOSPITAL ENCOUNTER (OUTPATIENT)
Facility: AMBULATORY SURGERY CENTER | Age: 57
Discharge: HOME | End: 2022-04-22
Attending: INTERNAL MEDICINE | Admitting: INTERNAL MEDICINE
Payer: COMMERCIAL

## 2022-01-01 ENCOUNTER — HOSPITAL ENCOUNTER (INPATIENT)
Facility: CLINIC | Age: 57
LOS: 1 days | End: 2022-05-17
Attending: EMERGENCY MEDICINE | Admitting: INTERNAL MEDICINE
Payer: COMMERCIAL

## 2022-01-01 ENCOUNTER — TELEPHONE (OUTPATIENT)
Dept: SURGERY | Facility: CLINIC | Age: 57
End: 2022-01-01

## 2022-01-01 ENCOUNTER — HOSPITAL ENCOUNTER (INPATIENT)
Facility: CLINIC | Age: 57
LOS: 2 days | Discharge: LEFT BEFORE DISCHARGE | End: 2022-05-07
Attending: EMERGENCY MEDICINE | Admitting: STUDENT IN AN ORGANIZED HEALTH CARE EDUCATION/TRAINING PROGRAM
Payer: COMMERCIAL

## 2022-01-01 ENCOUNTER — APPOINTMENT (OUTPATIENT)
Dept: ULTRASOUND IMAGING | Facility: CLINIC | Age: 57
End: 2022-01-01
Attending: PHYSICIAN ASSISTANT
Payer: COMMERCIAL

## 2022-01-01 ENCOUNTER — APPOINTMENT (OUTPATIENT)
Dept: CARDIOLOGY | Facility: CLINIC | Age: 57
End: 2022-01-01
Attending: PHYSICIAN ASSISTANT
Payer: COMMERCIAL

## 2022-01-01 ENCOUNTER — MYC MEDICAL ADVICE (OUTPATIENT)
Dept: ONCOLOGY | Facility: CLINIC | Age: 57
End: 2022-01-01
Payer: COMMERCIAL

## 2022-01-01 ENCOUNTER — PRE VISIT (OUTPATIENT)
Dept: ONCOLOGY | Facility: CLINIC | Age: 57
End: 2022-01-01

## 2022-01-01 ENCOUNTER — ANCILLARY PROCEDURE (OUTPATIENT)
Dept: MAMMOGRAPHY | Facility: CLINIC | Age: 57
End: 2022-01-01
Attending: PHYSICIAN ASSISTANT
Payer: COMMERCIAL

## 2022-01-01 ENCOUNTER — PRE VISIT (OUTPATIENT)
Dept: GASTROENTEROLOGY | Facility: CLINIC | Age: 57
End: 2022-01-01
Payer: COMMERCIAL

## 2022-01-01 ENCOUNTER — NURSE TRIAGE (OUTPATIENT)
Dept: NURSING | Facility: CLINIC | Age: 57
End: 2022-01-01
Payer: COMMERCIAL

## 2022-01-01 ENCOUNTER — APPOINTMENT (OUTPATIENT)
Dept: CARDIOLOGY | Facility: CLINIC | Age: 57
End: 2022-01-01
Payer: COMMERCIAL

## 2022-01-01 VITALS
TEMPERATURE: 98.6 F | BODY MASS INDEX: 26.16 KG/M2 | SYSTOLIC BLOOD PRESSURE: 96 MMHG | HEIGHT: 65 IN | HEART RATE: 111 BPM | DIASTOLIC BLOOD PRESSURE: 61 MMHG | OXYGEN SATURATION: 99 % | WEIGHT: 157 LBS

## 2022-01-01 VITALS
HEART RATE: 94 BPM | TEMPERATURE: 97.9 F | DIASTOLIC BLOOD PRESSURE: 62 MMHG | SYSTOLIC BLOOD PRESSURE: 98 MMHG | OXYGEN SATURATION: 100 % | BODY MASS INDEX: 29.82 KG/M2 | HEIGHT: 65 IN | WEIGHT: 179 LBS

## 2022-01-01 VITALS
DIASTOLIC BLOOD PRESSURE: 60 MMHG | BODY MASS INDEX: 27.16 KG/M2 | TEMPERATURE: 98.6 F | SYSTOLIC BLOOD PRESSURE: 102 MMHG | HEART RATE: 122 BPM | OXYGEN SATURATION: 98 % | WEIGHT: 163 LBS | HEIGHT: 65 IN

## 2022-01-01 VITALS
DIASTOLIC BLOOD PRESSURE: 56 MMHG | OXYGEN SATURATION: 100 % | HEART RATE: 101 BPM | BODY MASS INDEX: 25.32 KG/M2 | WEIGHT: 154.4 LBS | TEMPERATURE: 98 F | SYSTOLIC BLOOD PRESSURE: 100 MMHG

## 2022-01-01 VITALS
HEIGHT: 65 IN | OXYGEN SATURATION: 100 % | DIASTOLIC BLOOD PRESSURE: 80 MMHG | SYSTOLIC BLOOD PRESSURE: 130 MMHG | BODY MASS INDEX: 26.02 KG/M2 | TEMPERATURE: 97.7 F | WEIGHT: 156.2 LBS | HEART RATE: 132 BPM

## 2022-01-01 VITALS
HEIGHT: 65 IN | DIASTOLIC BLOOD PRESSURE: 71 MMHG | SYSTOLIC BLOOD PRESSURE: 123 MMHG | BODY MASS INDEX: 28.39 KG/M2 | OXYGEN SATURATION: 99 % | RESPIRATION RATE: 21 BRPM | TEMPERATURE: 98.1 F | HEART RATE: 82 BPM | WEIGHT: 170.4 LBS

## 2022-01-01 VITALS
SYSTOLIC BLOOD PRESSURE: 117 MMHG | TEMPERATURE: 97.8 F | OXYGEN SATURATION: 95 % | HEART RATE: 84 BPM | RESPIRATION RATE: 16 BRPM | DIASTOLIC BLOOD PRESSURE: 80 MMHG

## 2022-01-01 VITALS — OXYGEN SATURATION: 99 % | DIASTOLIC BLOOD PRESSURE: 74 MMHG | HEART RATE: 108 BPM | SYSTOLIC BLOOD PRESSURE: 114 MMHG

## 2022-01-01 VITALS
DIASTOLIC BLOOD PRESSURE: 52 MMHG | WEIGHT: 174.38 LBS | SYSTOLIC BLOOD PRESSURE: 88 MMHG | RESPIRATION RATE: 35 BRPM | BODY MASS INDEX: 29.02 KG/M2 | TEMPERATURE: 97.7 F | OXYGEN SATURATION: 99 % | HEART RATE: 90 BPM

## 2022-01-01 DIAGNOSIS — Z11.59 NEED FOR HEPATITIS C SCREENING TEST: ICD-10-CM

## 2022-01-01 DIAGNOSIS — K62.89 RECTAL MASS: ICD-10-CM

## 2022-01-01 DIAGNOSIS — Z11.59 ENCOUNTER FOR SCREENING FOR OTHER VIRAL DISEASES: ICD-10-CM

## 2022-01-01 DIAGNOSIS — C20 RECTAL ADENOCARCINOMA METASTATIC TO LUNG (H): ICD-10-CM

## 2022-01-01 DIAGNOSIS — Z11.59 ENCOUNTER FOR SCREENING FOR OTHER VIRAL DISEASES: Primary | ICD-10-CM

## 2022-01-01 DIAGNOSIS — E87.6 HYPOKALEMIA: Primary | ICD-10-CM

## 2022-01-01 DIAGNOSIS — M54.50 CHRONIC MIDLINE LOW BACK PAIN WITHOUT SCIATICA: Primary | ICD-10-CM

## 2022-01-01 DIAGNOSIS — Z12.31 ENCOUNTER FOR SCREENING MAMMOGRAM FOR BREAST CANCER: ICD-10-CM

## 2022-01-01 DIAGNOSIS — M54.50 CHRONIC MIDLINE LOW BACK PAIN WITHOUT SCIATICA: ICD-10-CM

## 2022-01-01 DIAGNOSIS — F31.81 BIPOLAR 2 DISORDER (H): ICD-10-CM

## 2022-01-01 DIAGNOSIS — G89.29 CHRONIC MIDLINE LOW BACK PAIN WITHOUT SCIATICA: ICD-10-CM

## 2022-01-01 DIAGNOSIS — F10.10 ALCOHOL ABUSE: ICD-10-CM

## 2022-01-01 DIAGNOSIS — C20 RECTAL CANCER (H): ICD-10-CM

## 2022-01-01 DIAGNOSIS — Z87.891 PERSONAL HISTORY OF TOBACCO USE: ICD-10-CM

## 2022-01-01 DIAGNOSIS — U07.1 INFECTION DUE TO 2019 NOVEL CORONAVIRUS: Primary | ICD-10-CM

## 2022-01-01 DIAGNOSIS — C20 MALIGNANT NEOPLASM OF RECTUM (H): ICD-10-CM

## 2022-01-01 DIAGNOSIS — C20 RECTAL ADENOCARCINOMA METASTATIC TO LIVER (H): ICD-10-CM

## 2022-01-01 DIAGNOSIS — L56.4 POLYMORPHIC LIGHT ERUPTION: ICD-10-CM

## 2022-01-01 DIAGNOSIS — C78.00 RECTAL ADENOCARCINOMA METASTATIC TO LUNG (H): ICD-10-CM

## 2022-01-01 DIAGNOSIS — M79.89 SWELLING OF BOTH LOWER EXTREMITIES: ICD-10-CM

## 2022-01-01 DIAGNOSIS — Z23 NEED FOR VACCINATION: ICD-10-CM

## 2022-01-01 DIAGNOSIS — Z87.891 PERSONAL HISTORY OF TOBACCO USE: Primary | ICD-10-CM

## 2022-01-01 DIAGNOSIS — K62.89 RECTAL MASS: Primary | ICD-10-CM

## 2022-01-01 DIAGNOSIS — N17.0 ACUTE KIDNEY FAILURE WITH TUBULAR NECROSIS (H): ICD-10-CM

## 2022-01-01 DIAGNOSIS — C20 RECTAL CANCER (H): Primary | ICD-10-CM

## 2022-01-01 DIAGNOSIS — K92.2 GASTROINTESTINAL HEMORRHAGE, UNSPECIFIED GASTROINTESTINAL HEMORRHAGE TYPE: ICD-10-CM

## 2022-01-01 DIAGNOSIS — Z00.00 ROUTINE GENERAL MEDICAL EXAMINATION AT A HEALTH CARE FACILITY: Primary | ICD-10-CM

## 2022-01-01 DIAGNOSIS — Z20.822 CONTACT WITH AND (SUSPECTED) EXPOSURE TO COVID-19: ICD-10-CM

## 2022-01-01 DIAGNOSIS — E78.5 HYPERLIPIDEMIA LDL GOAL <100: ICD-10-CM

## 2022-01-01 DIAGNOSIS — N17.9 ACUTE KIDNEY INJURY (H): ICD-10-CM

## 2022-01-01 DIAGNOSIS — D64.9 ANEMIA, UNSPECIFIED TYPE: ICD-10-CM

## 2022-01-01 DIAGNOSIS — J20.9 ACUTE BRONCHITIS WITH SYMPTOMS > 10 DAYS: ICD-10-CM

## 2022-01-01 DIAGNOSIS — R60.0 PERIPHERAL EDEMA: ICD-10-CM

## 2022-01-01 DIAGNOSIS — C20 METASTASIS FROM RECTAL CANCER (H): ICD-10-CM

## 2022-01-01 DIAGNOSIS — Z12.11 SCREEN FOR COLON CANCER: ICD-10-CM

## 2022-01-01 DIAGNOSIS — R16.0 HEPATOMEGALY: ICD-10-CM

## 2022-01-01 DIAGNOSIS — F60.89: ICD-10-CM

## 2022-01-01 DIAGNOSIS — G89.29 CHRONIC MIDLINE LOW BACK PAIN WITHOUT SCIATICA: Primary | ICD-10-CM

## 2022-01-01 DIAGNOSIS — C78.7 SECONDARY MALIGNANT NEOPLASM OF LIVER (H): ICD-10-CM

## 2022-01-01 DIAGNOSIS — R60.0 LOCALIZED EDEMA: ICD-10-CM

## 2022-01-01 DIAGNOSIS — D50.0 IRON DEFICIENCY ANEMIA DUE TO CHRONIC BLOOD LOSS: ICD-10-CM

## 2022-01-01 DIAGNOSIS — C78.00 MALIGNANT NEOPLASM METASTATIC TO LUNG, UNSPECIFIED LATERALITY (H): ICD-10-CM

## 2022-01-01 DIAGNOSIS — Z71.6 ENCOUNTER FOR TOBACCO USE CESSATION COUNSELING: ICD-10-CM

## 2022-01-01 DIAGNOSIS — R06.02 SHORTNESS OF BREATH: ICD-10-CM

## 2022-01-01 DIAGNOSIS — Z11.4 SCREENING FOR HIV (HUMAN IMMUNODEFICIENCY VIRUS): ICD-10-CM

## 2022-01-01 DIAGNOSIS — C78.7 RECTAL ADENOCARCINOMA METASTATIC TO LIVER (H): ICD-10-CM

## 2022-01-01 DIAGNOSIS — Z12.4 CERVICAL CANCER SCREENING: ICD-10-CM

## 2022-01-01 DIAGNOSIS — R10.811 RIGHT UPPER QUADRANT ABDOMINAL TENDERNESS WITHOUT REBOUND TENDERNESS: ICD-10-CM

## 2022-01-01 DIAGNOSIS — E87.5 HYPERKALEMIA: ICD-10-CM

## 2022-01-01 DIAGNOSIS — C79.9 METASTASIS FROM RECTAL CANCER (H): ICD-10-CM

## 2022-01-01 DIAGNOSIS — R60.0 PERIPHERAL EDEMA: Primary | ICD-10-CM

## 2022-01-01 DIAGNOSIS — R35.0 URINARY FREQUENCY: ICD-10-CM

## 2022-01-01 LAB
ABO/RH(D): NORMAL
ABO/RH(D): NORMAL
ALBUMIN SERPL-MCNC: 1.4 G/DL (ref 3.4–5)
ALBUMIN SERPL-MCNC: 1.7 G/DL (ref 3.4–5)
ALBUMIN SERPL-MCNC: 1.8 G/DL (ref 3.4–5)
ALBUMIN SERPL-MCNC: 1.9 G/DL (ref 3.4–5)
ALBUMIN SERPL-MCNC: 2 G/DL (ref 3.4–5)
ALBUMIN SERPL-MCNC: 2.2 G/DL (ref 3.4–5)
ALBUMIN UR-MCNC: NEGATIVE MG/DL
ALBUMIN UR-MCNC: NEGATIVE MG/DL
ALP SERPL-CCNC: 1033 U/L (ref 40–150)
ALP SERPL-CCNC: 1034 U/L (ref 40–150)
ALP SERPL-CCNC: 1407 U/L (ref 40–150)
ALP SERPL-CCNC: 1475 U/L (ref 40–150)
ALP SERPL-CCNC: 502 U/L (ref 40–150)
ALP SERPL-CCNC: 761 U/L (ref 40–150)
ALP SERPL-CCNC: 806 U/L (ref 40–150)
ALP SERPL-CCNC: 976 U/L (ref 40–150)
ALT SERPL W P-5'-P-CCNC: 22 U/L (ref 0–50)
ALT SERPL W P-5'-P-CCNC: 40 U/L (ref 0–50)
ALT SERPL W P-5'-P-CCNC: 46 U/L (ref 0–50)
ALT SERPL W P-5'-P-CCNC: 58 U/L (ref 0–50)
ALT SERPL W P-5'-P-CCNC: 58 U/L (ref 0–50)
ALT SERPL W P-5'-P-CCNC: 62 U/L (ref 0–50)
ALT SERPL W P-5'-P-CCNC: 84 U/L (ref 0–50)
ALT SERPL W P-5'-P-CCNC: 88 U/L (ref 0–50)
ANION GAP SERPL CALCULATED.3IONS-SCNC: 11 MMOL/L (ref 3–14)
ANION GAP SERPL CALCULATED.3IONS-SCNC: 14 MMOL/L (ref 3–14)
ANION GAP SERPL CALCULATED.3IONS-SCNC: 14 MMOL/L (ref 3–14)
ANION GAP SERPL CALCULATED.3IONS-SCNC: 17 MMOL/L (ref 3–14)
ANION GAP SERPL CALCULATED.3IONS-SCNC: 17 MMOL/L (ref 3–14)
ANION GAP SERPL CALCULATED.3IONS-SCNC: 18 MMOL/L (ref 3–14)
ANION GAP SERPL CALCULATED.3IONS-SCNC: 18 MMOL/L (ref 3–14)
ANION GAP SERPL CALCULATED.3IONS-SCNC: 9 MMOL/L (ref 3–14)
ANTIBODY SCREEN: NEGATIVE
ANTIBODY SCREEN: NEGATIVE
APPEARANCE UR: CLEAR
APPEARANCE UR: CLEAR
APTT PPP: 33 SECONDS (ref 22–38)
APTT PPP: 37 SECONDS (ref 22–38)
AST SERPL W P-5'-P-CCNC: 103 U/L (ref 0–45)
AST SERPL W P-5'-P-CCNC: 270 U/L (ref 0–45)
AST SERPL W P-5'-P-CCNC: 292 U/L (ref 0–45)
AST SERPL W P-5'-P-CCNC: 327 U/L (ref 0–45)
AST SERPL W P-5'-P-CCNC: 388 U/L (ref 0–45)
AST SERPL W P-5'-P-CCNC: 404 U/L (ref 0–45)
AST SERPL W P-5'-P-CCNC: 624 U/L (ref 0–45)
AST SERPL W P-5'-P-CCNC: 679 U/L (ref 0–45)
ATRIAL RATE - MUSE: 81 BPM
ATRIAL RATE - MUSE: 88 BPM
BACTERIA #/AREA URNS HPF: ABNORMAL /HPF
BACTERIA BLD CULT: NO GROWTH
BACTERIA BLD CULT: NO GROWTH
BACTERIA UR CULT: NO GROWTH
BASE EXCESS BLDV CALC-SCNC: 1.7 MMOL/L (ref -7.7–1.9)
BASOPHILS # BLD AUTO: 0.1 10E3/UL (ref 0–0.2)
BASOPHILS # BLD AUTO: 0.2 10E3/UL (ref 0–0.2)
BASOPHILS NFR BLD AUTO: 0 %
BASOPHILS NFR BLD AUTO: 1 %
BASOPHILS NFR BLD AUTO: 1 %
BILIRUB DIRECT SERPL-MCNC: 0.2 MG/DL (ref 0–0.2)
BILIRUB DIRECT SERPL-MCNC: 1.8 MG/DL (ref 0–0.2)
BILIRUB DIRECT SERPL-MCNC: 2.7 MG/DL (ref 0–0.2)
BILIRUB DIRECT SERPL-MCNC: 3.2 MG/DL (ref 0–0.2)
BILIRUB SERPL-MCNC: 0.4 MG/DL (ref 0.2–1.3)
BILIRUB SERPL-MCNC: 0.9 MG/DL (ref 0.2–1.3)
BILIRUB SERPL-MCNC: 0.9 MG/DL (ref 0.2–1.3)
BILIRUB SERPL-MCNC: 2.7 MG/DL (ref 0.2–1.3)
BILIRUB SERPL-MCNC: 2.9 MG/DL (ref 0.2–1.3)
BILIRUB SERPL-MCNC: 3.4 MG/DL (ref 0.2–1.3)
BILIRUB SERPL-MCNC: 3.8 MG/DL (ref 0.2–1.3)
BILIRUB SERPL-MCNC: 3.9 MG/DL (ref 0.2–1.3)
BILIRUB SERPL-MCNC: 4.4 MG/DL (ref 0.2–1.3)
BILIRUB UR QL STRIP: NEGATIVE
BILIRUB UR QL STRIP: NEGATIVE
BKR LAB AP GYN ADEQUACY: NORMAL
BKR LAB AP GYN INTERPRETATION: NORMAL
BKR LAB AP HPV REFLEX: NORMAL
BKR LAB AP PREVIOUS ABNORMAL: NORMAL
BLD PROD TYP BPU: NORMAL
BLOOD COMPONENT TYPE: NORMAL
BUN SERPL-MCNC: 14 MG/DL (ref 7–30)
BUN SERPL-MCNC: 24 MG/DL (ref 7–30)
BUN SERPL-MCNC: 25 MG/DL (ref 7–30)
BUN SERPL-MCNC: 26 MG/DL (ref 7–30)
BUN SERPL-MCNC: 30 MG/DL (ref 7–30)
BUN SERPL-MCNC: 30 MG/DL (ref 7–30)
BUN SERPL-MCNC: 40 MG/DL (ref 7–30)
BUN SERPL-MCNC: 45 MG/DL (ref 7–30)
CALCIUM SERPL-MCNC: 10.4 MG/DL (ref 8.5–10.1)
CALCIUM SERPL-MCNC: 10.9 MG/DL (ref 8.5–10.1)
CALCIUM SERPL-MCNC: 6.8 MG/DL (ref 8.5–10.1)
CALCIUM SERPL-MCNC: 6.9 MG/DL (ref 8.5–10.1)
CALCIUM SERPL-MCNC: 8.5 MG/DL (ref 8.5–10.1)
CALCIUM SERPL-MCNC: 8.5 MG/DL (ref 8.5–10.1)
CALCIUM SERPL-MCNC: 9 MG/DL (ref 8.5–10.1)
CALCIUM SERPL-MCNC: 9.5 MG/DL (ref 8.5–10.1)
CEA SERPL-MCNC: 3837.4 UG/L (ref 0–2.5)
CEA SERPL-MCNC: 6475.2 UG/L (ref 0–2.5)
CHLORIDE BLD-SCNC: 100 MMOL/L (ref 94–109)
CHLORIDE BLD-SCNC: 100 MMOL/L (ref 94–109)
CHLORIDE BLD-SCNC: 101 MMOL/L (ref 94–109)
CHLORIDE BLD-SCNC: 102 MMOL/L (ref 94–109)
CHLORIDE BLD-SCNC: 104 MMOL/L (ref 94–109)
CHLORIDE BLD-SCNC: 94 MMOL/L (ref 94–109)
CHLORIDE BLD-SCNC: 94 MMOL/L (ref 94–109)
CHLORIDE BLD-SCNC: 95 MMOL/L (ref 94–109)
CHOLEST SERPL-MCNC: 173 MG/DL
CK SERPL-CCNC: 2401 U/L (ref 30–225)
CO2 SERPL-SCNC: 11 MMOL/L (ref 20–32)
CO2 SERPL-SCNC: 13 MMOL/L (ref 20–32)
CO2 SERPL-SCNC: 17 MMOL/L (ref 20–32)
CO2 SERPL-SCNC: 23 MMOL/L (ref 20–32)
CO2 SERPL-SCNC: 24 MMOL/L (ref 20–32)
CO2 SERPL-SCNC: 26 MMOL/L (ref 20–32)
CO2 SERPL-SCNC: 26 MMOL/L (ref 20–32)
CO2 SERPL-SCNC: 27 MMOL/L (ref 20–32)
CODING SYSTEM: NORMAL
COLONOSCOPY: NORMAL
COLOR UR AUTO: YELLOW
COLOR UR AUTO: YELLOW
CREAT BLD-MCNC: 1.2 MG/DL (ref 0.5–1)
CREAT SERPL-MCNC: 1.03 MG/DL (ref 0.52–1.04)
CREAT SERPL-MCNC: 1.12 MG/DL (ref 0.52–1.04)
CREAT SERPL-MCNC: 1.12 MG/DL (ref 0.52–1.04)
CREAT SERPL-MCNC: 1.28 MG/DL (ref 0.52–1.04)
CREAT SERPL-MCNC: 1.42 MG/DL (ref 0.52–1.04)
CREAT SERPL-MCNC: 1.57 MG/DL (ref 0.52–1.04)
CREAT SERPL-MCNC: 3.1 MG/DL (ref 0.52–1.04)
CREAT SERPL-MCNC: 3.18 MG/DL (ref 0.52–1.04)
CROSSMATCH: NORMAL
CRP SERPL-MCNC: 210 MG/L (ref 0–8)
DIASTOLIC BLOOD PRESSURE - MUSE: NORMAL MMHG
DIASTOLIC BLOOD PRESSURE - MUSE: NORMAL MMHG
EOSINOPHIL # BLD AUTO: 0.2 10E3/UL (ref 0–0.7)
EOSINOPHIL # BLD AUTO: 0.2 10E3/UL (ref 0–0.7)
EOSINOPHIL # BLD AUTO: 0.5 10E3/UL (ref 0–0.7)
EOSINOPHIL # BLD AUTO: 0.6 10E3/UL (ref 0–0.7)
EOSINOPHIL # BLD AUTO: 0.8 10E3/UL (ref 0–0.7)
EOSINOPHIL NFR BLD AUTO: 1 %
EOSINOPHIL NFR BLD AUTO: 1 %
EOSINOPHIL NFR BLD AUTO: 2 %
EOSINOPHIL NFR BLD AUTO: 3 %
EOSINOPHIL NFR BLD AUTO: 3 %
ERYTHROCYTE [DISTWIDTH] IN BLOOD BY AUTOMATED COUNT: 16.1 % (ref 10–15)
ERYTHROCYTE [DISTWIDTH] IN BLOOD BY AUTOMATED COUNT: 22.7 % (ref 10–15)
ERYTHROCYTE [DISTWIDTH] IN BLOOD BY AUTOMATED COUNT: 23 % (ref 10–15)
ERYTHROCYTE [DISTWIDTH] IN BLOOD BY AUTOMATED COUNT: 23.2 % (ref 10–15)
ERYTHROCYTE [DISTWIDTH] IN BLOOD BY AUTOMATED COUNT: 23.4 % (ref 10–15)
ERYTHROCYTE [DISTWIDTH] IN BLOOD BY AUTOMATED COUNT: 23.7 % (ref 10–15)
ERYTHROCYTE [DISTWIDTH] IN BLOOD BY AUTOMATED COUNT: 23.7 % (ref 10–15)
ERYTHROCYTE [DISTWIDTH] IN BLOOD BY AUTOMATED COUNT: 25.4 % (ref 10–15)
ERYTHROCYTE [DISTWIDTH] IN BLOOD BY AUTOMATED COUNT: 25.5 % (ref 10–15)
ETHANOL SERPL-MCNC: 0.05 G/DL
FASTING STATUS PATIENT QL REPORTED: YES
FIBRINOGEN PPP-MCNC: 509 MG/DL (ref 170–490)
GFR SERPL CREATININE-BSD FRML MDRD: 17 ML/MIN/1.73M2
GFR SERPL CREATININE-BSD FRML MDRD: 17 ML/MIN/1.73M2
GFR SERPL CREATININE-BSD FRML MDRD: 39 ML/MIN/1.73M2
GFR SERPL CREATININE-BSD FRML MDRD: 43 ML/MIN/1.73M2
GFR SERPL CREATININE-BSD FRML MDRD: 49 ML/MIN/1.73M2
GFR SERPL CREATININE-BSD FRML MDRD: 53 ML/MIN/1.73M2
GFR SERPL CREATININE-BSD FRML MDRD: 58 ML/MIN/1.73M2
GFR SERPL CREATININE-BSD FRML MDRD: 58 ML/MIN/1.73M2
GFR SERPL CREATININE-BSD FRML MDRD: 64 ML/MIN/1.73M2
GLUCOSE BLD-MCNC: 101 MG/DL (ref 70–99)
GLUCOSE BLD-MCNC: 102 MG/DL (ref 70–99)
GLUCOSE BLD-MCNC: 108 MG/DL (ref 70–99)
GLUCOSE BLD-MCNC: 109 MG/DL (ref 70–99)
GLUCOSE BLD-MCNC: 114 MG/DL (ref 70–99)
GLUCOSE BLD-MCNC: 72 MG/DL (ref 70–99)
GLUCOSE BLD-MCNC: 80 MG/DL (ref 70–99)
GLUCOSE BLD-MCNC: 81 MG/DL (ref 70–99)
GLUCOSE BLDC GLUCOMTR-MCNC: 113 MG/DL (ref 70–99)
GLUCOSE BLDC GLUCOMTR-MCNC: 126 MG/DL (ref 70–99)
GLUCOSE BLDC GLUCOMTR-MCNC: 170 MG/DL (ref 70–99)
GLUCOSE BLDC GLUCOMTR-MCNC: 77 MG/DL (ref 70–99)
GLUCOSE UR STRIP-MCNC: NEGATIVE MG/DL
GLUCOSE UR STRIP-MCNC: NEGATIVE MG/DL
HCO3 BLDV-SCNC: 26 MMOL/L (ref 21–28)
HCT VFR BLD AUTO: 17.1 % (ref 35–47)
HCT VFR BLD AUTO: 20 % (ref 35–47)
HCT VFR BLD AUTO: 22 % (ref 35–47)
HCT VFR BLD AUTO: 22.9 % (ref 35–47)
HCT VFR BLD AUTO: 26 % (ref 35–47)
HCT VFR BLD AUTO: 26.2 % (ref 35–47)
HCT VFR BLD AUTO: 26.5 % (ref 35–47)
HCT VFR BLD AUTO: 28 % (ref 35–47)
HCT VFR BLD AUTO: 28.4 % (ref 35–47)
HCT VFR BLD AUTO: 28.6 % (ref 35–47)
HDLC SERPL-MCNC: 15 MG/DL
HGB BLD-MCNC: 4.8 G/DL (ref 11.7–15.7)
HGB BLD-MCNC: 5.4 G/DL (ref 11.7–15.7)
HGB BLD-MCNC: 5.8 G/DL (ref 11.7–15.7)
HGB BLD-MCNC: 5.8 G/DL (ref 11.7–15.7)
HGB BLD-MCNC: 6.8 G/DL (ref 11.7–15.7)
HGB BLD-MCNC: 7.1 G/DL (ref 11.7–15.7)
HGB BLD-MCNC: 8.2 G/DL (ref 11.7–15.7)
HGB BLD-MCNC: 8.2 G/DL (ref 11.7–15.7)
HGB BLD-MCNC: 8.4 G/DL (ref 11.7–15.7)
HGB BLD-MCNC: 8.7 G/DL (ref 11.7–15.7)
HGB BLD-MCNC: 8.9 G/DL (ref 11.7–15.7)
HGB BLD-MCNC: 8.9 G/DL (ref 11.7–15.7)
HGB BLD-MCNC: 9 G/DL (ref 11.7–15.7)
HGB UR QL STRIP: ABNORMAL
HGB UR QL STRIP: NEGATIVE
HOLD SPECIMEN: NORMAL
HUMAN PAPILLOMA VIRUS 16 DNA: NEGATIVE
HUMAN PAPILLOMA VIRUS 18 DNA: NEGATIVE
HUMAN PAPILLOMA VIRUS FINAL DIAGNOSIS: ABNORMAL
HUMAN PAPILLOMA VIRUS OTHER HR: POSITIVE
IMM GRANULOCYTES # BLD: 0.2 10E3/UL
IMM GRANULOCYTES # BLD: 0.2 10E3/UL
IMM GRANULOCYTES # BLD: 0.3 10E3/UL
IMM GRANULOCYTES # BLD: 0.4 10E3/UL
IMM GRANULOCYTES # BLD: 0.4 10E3/UL
IMM GRANULOCYTES NFR BLD: 1 %
IMM GRANULOCYTES NFR BLD: 2 %
IMM GRANULOCYTES NFR BLD: 2 %
INR PPP: 1.41 (ref 0.85–1.15)
INR PPP: 1.46 (ref 0.85–1.15)
INR PPP: 1.52 (ref 0.85–1.15)
INR PPP: 1.65 (ref 0.85–1.15)
INR PPP: 2.41 (ref 0.85–1.15)
INR PPP: 2.64 (ref 0.85–1.15)
INTERPRETATION ECG - MUSE: NORMAL
INTERPRETATION ECG - MUSE: NORMAL
ISSUE DATE AND TIME: NORMAL
KETONES UR STRIP-MCNC: ABNORMAL MG/DL
KETONES UR STRIP-MCNC: NEGATIVE MG/DL
LACTATE SERPL-SCNC: 1.9 MMOL/L (ref 0.7–2)
LACTATE SERPL-SCNC: 10.1 MMOL/L (ref 0.7–2)
LACTATE SERPL-SCNC: 2.2 MMOL/L (ref 0.7–2)
LACTATE SERPL-SCNC: 2.4 MMOL/L (ref 0.7–2)
LACTATE SERPL-SCNC: 4.2 MMOL/L (ref 0.7–2)
LACTATE SERPL-SCNC: 4.4 MMOL/L (ref 0.7–2)
LACTATE SERPL-SCNC: 5.5 MMOL/L (ref 0.7–2)
LACTATE SERPL-SCNC: 5.6 MMOL/L (ref 0.7–2)
LACTATE SERPL-SCNC: 7.3 MMOL/L (ref 0.7–2)
LACTATE SERPL-SCNC: 8.2 MMOL/L (ref 0.7–2)
LDH SERPL L TO P-CCNC: 1866 U/L (ref 81–234)
LDH SERPL L TO P-CCNC: 3568 U/L (ref 81–234)
LDLC SERPL CALC-MCNC: 113 MG/DL
LEUKOCYTE ESTERASE UR QL STRIP: ABNORMAL
LEUKOCYTE ESTERASE UR QL STRIP: NEGATIVE
LIPASE SERPL-CCNC: 420 U/L (ref 73–393)
LVEF ECHO: NORMAL
LVEF ECHO: NORMAL
LYMPHOCYTES # BLD AUTO: 1.6 10E3/UL (ref 0.8–5.3)
LYMPHOCYTES # BLD AUTO: 1.9 10E3/UL (ref 0.8–5.3)
LYMPHOCYTES # BLD AUTO: 1.9 10E3/UL (ref 0.8–5.3)
LYMPHOCYTES # BLD AUTO: 2.4 10E3/UL (ref 0.8–5.3)
LYMPHOCYTES # BLD AUTO: 3.3 10E3/UL (ref 0.8–5.3)
LYMPHOCYTES NFR BLD AUTO: 11 %
LYMPHOCYTES NFR BLD AUTO: 12 %
LYMPHOCYTES NFR BLD AUTO: 7 %
LYMPHOCYTES NFR BLD AUTO: 8 %
LYMPHOCYTES NFR BLD AUTO: 8 %
MAGNESIUM SERPL-MCNC: 2 MG/DL (ref 1.6–2.3)
MAGNESIUM SERPL-MCNC: 2.5 MG/DL (ref 1.6–2.3)
MCH RBC QN AUTO: 27.6 PG (ref 26.5–33)
MCH RBC QN AUTO: 27.7 PG (ref 26.5–33)
MCH RBC QN AUTO: 27.9 PG (ref 26.5–33)
MCH RBC QN AUTO: 27.9 PG (ref 26.5–33)
MCH RBC QN AUTO: 28.1 PG (ref 26.5–33)
MCH RBC QN AUTO: 28.2 PG (ref 26.5–33)
MCH RBC QN AUTO: 28.2 PG (ref 26.5–33)
MCH RBC QN AUTO: 28.6 PG (ref 26.5–33)
MCH RBC QN AUTO: 28.9 PG (ref 26.5–33)
MCHC RBC AUTO-ENTMCNC: 28.1 G/DL (ref 31.5–36.5)
MCHC RBC AUTO-ENTMCNC: 29 G/DL (ref 31.5–36.5)
MCHC RBC AUTO-ENTMCNC: 30 G/DL (ref 31.5–36.5)
MCHC RBC AUTO-ENTMCNC: 30.9 G/DL (ref 31.5–36.5)
MCHC RBC AUTO-ENTMCNC: 31.1 G/DL (ref 31.5–36.5)
MCHC RBC AUTO-ENTMCNC: 31.3 G/DL (ref 31.5–36.5)
MCHC RBC AUTO-ENTMCNC: 31.7 G/DL (ref 31.5–36.5)
MCHC RBC AUTO-ENTMCNC: 31.7 G/DL (ref 31.5–36.5)
MCHC RBC AUTO-ENTMCNC: 32.3 G/DL (ref 31.5–36.5)
MCV RBC AUTO: 89 FL (ref 78–100)
MCV RBC AUTO: 90 FL (ref 78–100)
MCV RBC AUTO: 91 FL (ref 78–100)
MCV RBC AUTO: 94 FL (ref 78–100)
MCV RBC AUTO: 98 FL (ref 78–100)
MCV RBC AUTO: 99 FL (ref 78–100)
MONOCYTES # BLD AUTO: 1.2 10E3/UL (ref 0–1.3)
MONOCYTES # BLD AUTO: 1.3 10E3/UL (ref 0–1.3)
MONOCYTES # BLD AUTO: 1.4 10E3/UL (ref 0–1.3)
MONOCYTES # BLD AUTO: 1.5 10E3/UL (ref 0–1.3)
MONOCYTES # BLD AUTO: 1.8 10E3/UL (ref 0–1.3)
MONOCYTES NFR BLD AUTO: 6 %
MONOCYTES NFR BLD AUTO: 7 %
NEUTROPHILS # BLD AUTO: 16.1 10E3/UL (ref 1.6–8.3)
NEUTROPHILS # BLD AUTO: 17.2 10E3/UL (ref 1.6–8.3)
NEUTROPHILS # BLD AUTO: 19.4 10E3/UL (ref 1.6–8.3)
NEUTROPHILS # BLD AUTO: 20.7 10E3/UL (ref 1.6–8.3)
NEUTROPHILS # BLD AUTO: 21.3 10E3/UL (ref 1.6–8.3)
NEUTROPHILS NFR BLD AUTO: 77 %
NEUTROPHILS NFR BLD AUTO: 78 %
NEUTROPHILS NFR BLD AUTO: 81 %
NEUTROPHILS NFR BLD AUTO: 84 %
NEUTROPHILS NFR BLD AUTO: 84 %
NITRATE UR QL: NEGATIVE
NITRATE UR QL: NEGATIVE
NONHDLC SERPL-MCNC: 158 MG/DL
NRBC # BLD AUTO: 0 10E3/UL
NRBC # BLD AUTO: 0.1 10E3/UL
NRBC BLD AUTO-RTO: 0 /100
NT-PROBNP SERPL-MCNC: 5750 PG/ML (ref 0–900)
O2/TOTAL GAS SETTING VFR VENT: 0 %
P AXIS - MUSE: 50 DEGREES
P AXIS - MUSE: 62 DEGREES
PATH REPORT.COMMENTS IMP SPEC: ABNORMAL
PATH REPORT.COMMENTS IMP SPEC: ABNORMAL
PATH REPORT.COMMENTS IMP SPEC: NORMAL
PATH REPORT.COMMENTS IMP SPEC: NORMAL
PATH REPORT.COMMENTS IMP SPEC: YES
PATH REPORT.FINAL DX SPEC: ABNORMAL
PATH REPORT.GROSS SPEC: ABNORMAL
PATH REPORT.MICROSCOPIC SPEC OTHER STN: ABNORMAL
PATH REPORT.RELEVANT HX SPEC: ABNORMAL
PATH REPORT.RELEVANT HX SPEC: NORMAL
PATHOLOGY SYNOPTIC REPORT: ABNORMAL
PCO2 BLDV: 37 MM HG (ref 40–50)
PH BLDV: 7.46 [PH] (ref 7.32–7.43)
PH UR STRIP: 5 [PH] (ref 5–7)
PH UR STRIP: 5 [PH] (ref 5–7)
PHOSPHATE SERPL-MCNC: 3.3 MG/DL (ref 2.5–4.5)
PHOSPHATE SERPL-MCNC: 4.2 MG/DL (ref 2.5–4.5)
PHOSPHATE SERPL-MCNC: 8.9 MG/DL (ref 2.5–4.5)
PHOTO IMAGE: ABNORMAL
PLAT MORPH BLD: ABNORMAL
PLAT MORPH BLD: NORMAL
PLATELET # BLD AUTO: 180 10E3/UL (ref 150–450)
PLATELET # BLD AUTO: 187 10E3/UL (ref 150–450)
PLATELET # BLD AUTO: 191 10E3/UL (ref 150–450)
PLATELET # BLD AUTO: 197 10E3/UL (ref 150–450)
PLATELET # BLD AUTO: 204 10E3/UL (ref 150–450)
PLATELET # BLD AUTO: 218 10E3/UL (ref 150–450)
PLATELET # BLD AUTO: 313 10E3/UL (ref 150–450)
PLATELET # BLD AUTO: 318 10E3/UL (ref 150–450)
PLATELET # BLD AUTO: 543 10E3/UL (ref 150–450)
PO2 BLDV: 28 MM HG (ref 25–47)
POLYCHROMASIA BLD QL SMEAR: SLIGHT
POTASSIUM BLD-SCNC: 2.9 MMOL/L (ref 3.4–5.3)
POTASSIUM BLD-SCNC: 3.1 MMOL/L (ref 3.4–5.3)
POTASSIUM BLD-SCNC: 3.3 MMOL/L (ref 3.4–5.3)
POTASSIUM BLD-SCNC: 3.4 MMOL/L (ref 3.4–5.3)
POTASSIUM BLD-SCNC: 3.5 MMOL/L (ref 3.4–5.3)
POTASSIUM BLD-SCNC: 3.9 MMOL/L (ref 3.4–5.3)
POTASSIUM BLD-SCNC: 5.8 MMOL/L (ref 3.4–5.3)
POTASSIUM BLD-SCNC: 6 MMOL/L (ref 3.4–5.3)
PR INTERVAL - MUSE: 162 MS
PR INTERVAL - MUSE: 186 MS
PROCALCITONIN SERPL-MCNC: 4.34 NG/ML
PROCALCITONIN SERPL-MCNC: 4.59 NG/ML
PROT SERPL-MCNC: 6.1 G/DL (ref 6.8–8.8)
PROT SERPL-MCNC: 6.2 G/DL (ref 6.8–8.8)
PROT SERPL-MCNC: 6.5 G/DL (ref 6.8–8.8)
PROT SERPL-MCNC: 6.8 G/DL (ref 6.8–8.8)
PROT SERPL-MCNC: 6.8 G/DL (ref 6.8–8.8)
PROT SERPL-MCNC: 7.1 G/DL (ref 6.8–8.8)
PROT SERPL-MCNC: 7.2 G/DL (ref 6.8–8.8)
PROT SERPL-MCNC: 7.2 G/DL (ref 6.8–8.8)
QRS DURATION - MUSE: 104 MS
QRS DURATION - MUSE: 134 MS
QT - MUSE: 478 MS
QT - MUSE: 512 MS
QTC - MUSE: 578 MS
QTC - MUSE: 594 MS
R AXIS - MUSE: -37 DEGREES
R AXIS - MUSE: -50 DEGREES
RBC # BLD AUTO: 1.73 10E6/UL (ref 3.8–5.2)
RBC # BLD AUTO: 2.04 10E6/UL (ref 3.8–5.2)
RBC # BLD AUTO: 2.46 10E6/UL (ref 3.8–5.2)
RBC # BLD AUTO: 2.91 10E6/UL (ref 3.8–5.2)
RBC # BLD AUTO: 2.94 10E6/UL (ref 3.8–5.2)
RBC # BLD AUTO: 2.98 10E6/UL (ref 3.8–5.2)
RBC # BLD AUTO: 2.99 10E6/UL (ref 3.8–5.2)
RBC # BLD AUTO: 3.15 10E6/UL (ref 3.8–5.2)
RBC # BLD AUTO: 3.16 10E6/UL (ref 3.8–5.2)
RBC #/AREA URNS AUTO: ABNORMAL /HPF
RBC MORPH BLD: ABNORMAL
RBC MORPH BLD: NORMAL
SARS-COV-2 RNA RESP QL NAA+PROBE: NEGATIVE
SODIUM SERPL-SCNC: 130 MMOL/L (ref 133–144)
SODIUM SERPL-SCNC: 131 MMOL/L (ref 133–144)
SODIUM SERPL-SCNC: 134 MMOL/L (ref 133–144)
SODIUM SERPL-SCNC: 135 MMOL/L (ref 133–144)
SODIUM SERPL-SCNC: 135 MMOL/L (ref 133–144)
SODIUM SERPL-SCNC: 136 MMOL/L (ref 133–144)
SODIUM SERPL-SCNC: 136 MMOL/L (ref 133–144)
SODIUM SERPL-SCNC: 138 MMOL/L (ref 133–144)
SP GR UR STRIP: 1.01 (ref 1–1.03)
SP GR UR STRIP: >=1.03 (ref 1–1.03)
SPECIMEN EXPIRATION DATE: NORMAL
SPECIMEN EXPIRATION DATE: NORMAL
SQUAMOUS #/AREA URNS AUTO: ABNORMAL /LPF
STOMATOCYTES BLD QL SMEAR: ABNORMAL
SYSTOLIC BLOOD PRESSURE - MUSE: NORMAL MMHG
SYSTOLIC BLOOD PRESSURE - MUSE: NORMAL MMHG
T AXIS - MUSE: 30 DEGREES
T AXIS - MUSE: 36 DEGREES
TARGETS BLD QL SMEAR: SLIGHT
TRIGL SERPL-MCNC: 227 MG/DL
TROPONIN I SERPL HS-MCNC: 14 NG/L
UNIT ABO/RH: NORMAL
UNIT NUMBER: NORMAL
UNIT STATUS: NORMAL
UNIT TYPE ISBT: 600
URATE SERPL-MCNC: 15 MG/DL (ref 2.6–6)
URATE SERPL-MCNC: 9.6 MG/DL (ref 2.6–6)
UROBILINOGEN UR STRIP-ACNC: 0.2 E.U./DL
UROBILINOGEN UR STRIP-ACNC: 0.2 E.U./DL
VENTRICULAR RATE- MUSE: 81 BPM
VENTRICULAR RATE- MUSE: 88 BPM
WBC # BLD AUTO: 19.4 10E3/UL (ref 4–11)
WBC # BLD AUTO: 21.9 10E3/UL (ref 4–11)
WBC # BLD AUTO: 21.9 10E3/UL (ref 4–11)
WBC # BLD AUTO: 23.1 10E3/UL (ref 4–11)
WBC # BLD AUTO: 24.8 10E3/UL (ref 4–11)
WBC # BLD AUTO: 25.5 10E3/UL (ref 4–11)
WBC # BLD AUTO: 26 10E3/UL (ref 4–11)
WBC # BLD AUTO: 27.3 10E3/UL (ref 4–11)
WBC # BLD AUTO: 28.1 10E3/UL (ref 4–11)
WBC #/AREA URNS AUTO: ABNORMAL /HPF

## 2022-01-01 PROCEDURE — 85025 COMPLETE CBC W/AUTO DIFF WBC: CPT | Performed by: EMERGENCY MEDICINE

## 2022-01-01 PROCEDURE — 85610 PROTHROMBIN TIME: CPT

## 2022-01-01 PROCEDURE — 96365 THER/PROPH/DIAG IV INF INIT: CPT | Mod: 59 | Performed by: EMERGENCY MEDICINE

## 2022-01-01 PROCEDURE — 99252 IP/OBS CONSLTJ NEW/EST SF 35: CPT | Mod: GC | Performed by: INTERNAL MEDICINE

## 2022-01-01 PROCEDURE — 85018 HEMOGLOBIN: CPT | Performed by: HOSPITALIST

## 2022-01-01 PROCEDURE — 86901 BLOOD TYPING SEROLOGIC RH(D): CPT | Performed by: EMERGENCY MEDICINE

## 2022-01-01 PROCEDURE — 82310 ASSAY OF CALCIUM: CPT | Performed by: PHYSICIAN ASSISTANT

## 2022-01-01 PROCEDURE — 83605 ASSAY OF LACTIC ACID: CPT | Performed by: EMERGENCY MEDICINE

## 2022-01-01 PROCEDURE — 84450 TRANSFERASE (AST) (SGOT): CPT | Performed by: FAMILY MEDICINE

## 2022-01-01 PROCEDURE — 250N000013 HC RX MED GY IP 250 OP 250 PS 637: Performed by: STUDENT IN AN ORGANIZED HEALTH CARE EDUCATION/TRAINING PROGRAM

## 2022-01-01 PROCEDURE — 82550 ASSAY OF CK (CPK): CPT | Performed by: NURSE PRACTITIONER

## 2022-01-01 PROCEDURE — 36415 COLL VENOUS BLD VENIPUNCTURE: CPT

## 2022-01-01 PROCEDURE — 86923 COMPATIBILITY TEST ELECTRIC: CPT | Performed by: EMERGENCY MEDICINE

## 2022-01-01 PROCEDURE — 250N000011 HC RX IP 250 OP 636: Performed by: PHYSICIAN ASSISTANT

## 2022-01-01 PROCEDURE — 99255 IP/OBS CONSLTJ NEW/EST HI 80: CPT | Performed by: NURSE PRACTITIONER

## 2022-01-01 PROCEDURE — 258N000003 HC RX IP 258 OP 636: Performed by: HOSPITALIST

## 2022-01-01 PROCEDURE — 97161 PT EVAL LOW COMPLEX 20 MIN: CPT | Mod: GP | Performed by: PHYSICAL THERAPIST

## 2022-01-01 PROCEDURE — 83880 ASSAY OF NATRIURETIC PEPTIDE: CPT | Performed by: EMERGENCY MEDICINE

## 2022-01-01 PROCEDURE — C9803 HOPD COVID-19 SPEC COLLECT: HCPCS | Performed by: EMERGENCY MEDICINE

## 2022-01-01 PROCEDURE — 96366 THER/PROPH/DIAG IV INF ADDON: CPT | Performed by: EMERGENCY MEDICINE

## 2022-01-01 PROCEDURE — 96376 TX/PRO/DX INJ SAME DRUG ADON: CPT | Performed by: EMERGENCY MEDICINE

## 2022-01-01 PROCEDURE — 250N000011 HC RX IP 250 OP 636: Performed by: HOSPITALIST

## 2022-01-01 PROCEDURE — 93970 EXTREMITY STUDY: CPT | Mod: 26 | Performed by: RADIOLOGY

## 2022-01-01 PROCEDURE — U0003 INFECTIOUS AGENT DETECTION BY NUCLEIC ACID (DNA OR RNA); SEVERE ACUTE RESPIRATORY SYNDROME CORONAVIRUS 2 (SARS-COV-2) (CORONAVIRUS DISEASE [COVID-19]), AMPLIFIED PROBE TECHNIQUE, MAKING USE OF HIGH THROUGHPUT TECHNOLOGIES AS DESCRIBED BY CMS-2020-01-R: HCPCS | Performed by: EMERGENCY MEDICINE

## 2022-01-01 PROCEDURE — 85027 COMPLETE CBC AUTOMATED: CPT | Performed by: PHYSICIAN ASSISTANT

## 2022-01-01 PROCEDURE — 99222 1ST HOSP IP/OBS MODERATE 55: CPT | Mod: GC | Performed by: RADIOLOGY

## 2022-01-01 PROCEDURE — 85027 COMPLETE CBC AUTOMATED: CPT | Performed by: FAMILY MEDICINE

## 2022-01-01 PROCEDURE — 82040 ASSAY OF SERUM ALBUMIN: CPT | Performed by: EMERGENCY MEDICINE

## 2022-01-01 PROCEDURE — 250N000011 HC RX IP 250 OP 636: Performed by: EMERGENCY MEDICINE

## 2022-01-01 PROCEDURE — 86140 C-REACTIVE PROTEIN: CPT | Performed by: PHYSICIAN ASSISTANT

## 2022-01-01 PROCEDURE — 74174 CTA ABD&PLVS W/CONTRAST: CPT

## 2022-01-01 PROCEDURE — 84155 ASSAY OF PROTEIN SERUM: CPT | Performed by: FAMILY MEDICINE

## 2022-01-01 PROCEDURE — 85018 HEMOGLOBIN: CPT | Performed by: STUDENT IN AN ORGANIZED HEALTH CARE EDUCATION/TRAINING PROGRAM

## 2022-01-01 PROCEDURE — 258N000003 HC RX IP 258 OP 636: Performed by: STUDENT IN AN ORGANIZED HEALTH CARE EDUCATION/TRAINING PROGRAM

## 2022-01-01 PROCEDURE — 99214 OFFICE O/P EST MOD 30 MIN: CPT | Performed by: PHYSICIAN ASSISTANT

## 2022-01-01 PROCEDURE — 85018 HEMOGLOBIN: CPT | Performed by: EMERGENCY MEDICINE

## 2022-01-01 PROCEDURE — 36415 COLL VENOUS BLD VENIPUNCTURE: CPT | Performed by: PHYSICIAN ASSISTANT

## 2022-01-01 PROCEDURE — 80076 HEPATIC FUNCTION PANEL: CPT | Performed by: PHYSICIAN ASSISTANT

## 2022-01-01 PROCEDURE — 85610 PROTHROMBIN TIME: CPT | Performed by: EMERGENCY MEDICINE

## 2022-01-01 PROCEDURE — 84075 ASSAY ALKALINE PHOSPHATASE: CPT | Performed by: FAMILY MEDICINE

## 2022-01-01 PROCEDURE — 84145 PROCALCITONIN (PCT): CPT | Performed by: PHYSICIAN ASSISTANT

## 2022-01-01 PROCEDURE — 96361 HYDRATE IV INFUSION ADD-ON: CPT | Performed by: EMERGENCY MEDICINE

## 2022-01-01 PROCEDURE — 250N000013 HC RX MED GY IP 250 OP 250 PS 637

## 2022-01-01 PROCEDURE — 99223 1ST HOSP IP/OBS HIGH 75: CPT | Mod: GC

## 2022-01-01 PROCEDURE — 85730 THROMBOPLASTIN TIME PARTIAL: CPT | Performed by: EMERGENCY MEDICINE

## 2022-01-01 PROCEDURE — 88305 TISSUE EXAM BY PATHOLOGIST: CPT | Performed by: PATHOLOGY

## 2022-01-01 PROCEDURE — 83615 LACTATE (LD) (LDH) ENZYME: CPT | Performed by: PHYSICIAN ASSISTANT

## 2022-01-01 PROCEDURE — 85014 HEMATOCRIT: CPT | Performed by: PHYSICIAN ASSISTANT

## 2022-01-01 PROCEDURE — 99417 PROLNG OP E/M EACH 15 MIN: CPT | Performed by: STUDENT IN AN ORGANIZED HEALTH CARE EDUCATION/TRAINING PROGRAM

## 2022-01-01 PROCEDURE — 90471 IMMUNIZATION ADMIN: CPT | Performed by: PHYSICIAN ASSISTANT

## 2022-01-01 PROCEDURE — 84460 ALANINE AMINO (ALT) (SGPT): CPT | Performed by: FAMILY MEDICINE

## 2022-01-01 PROCEDURE — 99291 CRITICAL CARE FIRST HOUR: CPT | Performed by: EMERGENCY MEDICINE

## 2022-01-01 PROCEDURE — 83605 ASSAY OF LACTIC ACID: CPT | Performed by: STUDENT IN AN ORGANIZED HEALTH CARE EDUCATION/TRAINING PROGRAM

## 2022-01-01 PROCEDURE — 87624 HPV HI-RISK TYP POOLED RSLT: CPT | Performed by: PHYSICIAN ASSISTANT

## 2022-01-01 PROCEDURE — 250N000011 HC RX IP 250 OP 636: Performed by: STUDENT IN AN ORGANIZED HEALTH CARE EDUCATION/TRAINING PROGRAM

## 2022-01-01 PROCEDURE — 71045 X-RAY EXAM CHEST 1 VIEW: CPT | Mod: 26 | Performed by: RADIOLOGY

## 2022-01-01 PROCEDURE — 81001 URINALYSIS AUTO W/SCOPE: CPT | Performed by: FAMILY MEDICINE

## 2022-01-01 PROCEDURE — P9047 ALBUMIN (HUMAN), 25%, 50ML: HCPCS | Performed by: STUDENT IN AN ORGANIZED HEALTH CARE EDUCATION/TRAINING PROGRAM

## 2022-01-01 PROCEDURE — 82378 CARCINOEMBRYONIC ANTIGEN: CPT | Mod: 90 | Performed by: PATHOLOGY

## 2022-01-01 PROCEDURE — 96375 TX/PRO/DX INJ NEW DRUG ADDON: CPT | Performed by: EMERGENCY MEDICINE

## 2022-01-01 PROCEDURE — 99223 1ST HOSP IP/OBS HIGH 75: CPT | Mod: AI | Performed by: PHYSICIAN ASSISTANT

## 2022-01-01 PROCEDURE — 82247 BILIRUBIN TOTAL: CPT | Performed by: FAMILY MEDICINE

## 2022-01-01 PROCEDURE — 97110 THERAPEUTIC EXERCISES: CPT | Mod: GO | Performed by: OCCUPATIONAL THERAPIST

## 2022-01-01 PROCEDURE — 250N000011 HC RX IP 250 OP 636: Mod: JA | Performed by: STUDENT IN AN ORGANIZED HEALTH CARE EDUCATION/TRAINING PROGRAM

## 2022-01-01 PROCEDURE — 93970 EXTREMITY STUDY: CPT

## 2022-01-01 PROCEDURE — 45338 SIGMOIDOSCOPY W/TUMR REMOVE: CPT

## 2022-01-01 PROCEDURE — 99495 TRANSJ CARE MGMT MOD F2F 14D: CPT | Performed by: PHYSICIAN ASSISTANT

## 2022-01-01 PROCEDURE — 80061 LIPID PANEL: CPT | Performed by: PHYSICIAN ASSISTANT

## 2022-01-01 PROCEDURE — 93306 TTE W/DOPPLER COMPLETE: CPT

## 2022-01-01 PROCEDURE — 250N000013 HC RX MED GY IP 250 OP 250 PS 637: Performed by: PHYSICIAN ASSISTANT

## 2022-01-01 PROCEDURE — 200N000002 HC R&B ICU UMMC

## 2022-01-01 PROCEDURE — 93306 TTE W/DOPPLER COMPLETE: CPT | Mod: 26 | Performed by: INTERNAL MEDICINE

## 2022-01-01 PROCEDURE — 99239 HOSP IP/OBS DSCHRG MGMT >30: CPT | Performed by: STUDENT IN AN ORGANIZED HEALTH CARE EDUCATION/TRAINING PROGRAM

## 2022-01-01 PROCEDURE — 88342 IMHCHEM/IMCYTCHM 1ST ANTB: CPT | Performed by: PATHOLOGY

## 2022-01-01 PROCEDURE — 99233 SBSQ HOSP IP/OBS HIGH 50: CPT | Mod: GC | Performed by: INTERNAL MEDICINE

## 2022-01-01 PROCEDURE — 82248 BILIRUBIN DIRECT: CPT | Performed by: STUDENT IN AN ORGANIZED HEALTH CARE EDUCATION/TRAINING PROGRAM

## 2022-01-01 PROCEDURE — 84100 ASSAY OF PHOSPHORUS: CPT | Performed by: PHYSICIAN ASSISTANT

## 2022-01-01 PROCEDURE — 87040 BLOOD CULTURE FOR BACTERIA: CPT | Performed by: EMERGENCY MEDICINE

## 2022-01-01 PROCEDURE — 36415 COLL VENOUS BLD VENIPUNCTURE: CPT | Performed by: EMERGENCY MEDICINE

## 2022-01-01 PROCEDURE — 80053 COMPREHEN METABOLIC PANEL: CPT | Performed by: STUDENT IN AN ORGANIZED HEALTH CARE EDUCATION/TRAINING PROGRAM

## 2022-01-01 PROCEDURE — 83735 ASSAY OF MAGNESIUM: CPT | Performed by: INTERNAL MEDICINE

## 2022-01-01 PROCEDURE — 99291 CRITICAL CARE FIRST HOUR: CPT | Mod: 25 | Performed by: EMERGENCY MEDICINE

## 2022-01-01 PROCEDURE — 81003 URINALYSIS AUTO W/O SCOPE: CPT | Performed by: PHYSICIAN ASSISTANT

## 2022-01-01 PROCEDURE — 36592 COLLECT BLOOD FROM PICC: CPT | Performed by: PHYSICIAN ASSISTANT

## 2022-01-01 PROCEDURE — 83690 ASSAY OF LIPASE: CPT | Performed by: EMERGENCY MEDICINE

## 2022-01-01 PROCEDURE — 97110 THERAPEUTIC EXERCISES: CPT | Mod: GP | Performed by: PHYSICAL THERAPIST

## 2022-01-01 PROCEDURE — 85018 HEMOGLOBIN: CPT

## 2022-01-01 PROCEDURE — 36415 COLL VENOUS BLD VENIPUNCTURE: CPT | Performed by: FAMILY MEDICINE

## 2022-01-01 PROCEDURE — 258N000003 HC RX IP 258 OP 636

## 2022-01-01 PROCEDURE — 96374 THER/PROPH/DIAG INJ IV PUSH: CPT | Performed by: EMERGENCY MEDICINE

## 2022-01-01 PROCEDURE — 82077 ASSAY SPEC XCP UR&BREATH IA: CPT | Performed by: PHYSICIAN ASSISTANT

## 2022-01-01 PROCEDURE — 74174 CTA ABD&PLVS W/CONTRAST: CPT | Mod: 26 | Performed by: RADIOLOGY

## 2022-01-01 PROCEDURE — 272N000451 HC KIT SHRLOCK 5FR POWER PICC DOUBLE LUMEN

## 2022-01-01 PROCEDURE — 272N000473 HC KIT, VPS RHYTHM STYLET

## 2022-01-01 PROCEDURE — 86850 RBC ANTIBODY SCREEN: CPT | Performed by: PHYSICIAN ASSISTANT

## 2022-01-01 PROCEDURE — 99233 SBSQ HOSP IP/OBS HIGH 50: CPT | Performed by: NURSE PRACTITIONER

## 2022-01-01 PROCEDURE — 83605 ASSAY OF LACTIC ACID: CPT

## 2022-01-01 PROCEDURE — 82378 CARCINOEMBRYONIC ANTIGEN: CPT | Performed by: PHYSICIAN ASSISTANT

## 2022-01-01 PROCEDURE — U0003 INFECTIOUS AGENT DETECTION BY NUCLEIC ACID (DNA OR RNA); SEVERE ACUTE RESPIRATORY SYNDROME CORONAVIRUS 2 (SARS-COV-2) (CORONAVIRUS DISEASE [COVID-19]), AMPLIFIED PROBE TECHNIQUE, MAKING USE OF HIGH THROUGHPUT TECHNOLOGIES AS DESCRIBED BY CMS-2020-01-R: HCPCS | Mod: 90 | Performed by: PATHOLOGY

## 2022-01-01 PROCEDURE — 80053 COMPREHEN METABOLIC PANEL: CPT | Performed by: EMERGENCY MEDICINE

## 2022-01-01 PROCEDURE — U0003 INFECTIOUS AGENT DETECTION BY NUCLEIC ACID (DNA OR RNA); SEVERE ACUTE RESPIRATORY SYNDROME CORONAVIRUS 2 (SARS-COV-2) (CORONAVIRUS DISEASE [COVID-19]), AMPLIFIED PROBE TECHNIQUE, MAKING USE OF HIGH THROUGHPUT TECHNOLOGIES AS DESCRIBED BY CMS-2020-01-R: HCPCS

## 2022-01-01 PROCEDURE — 99214 OFFICE O/P EST MOD 30 MIN: CPT | Performed by: FAMILY MEDICINE

## 2022-01-01 PROCEDURE — 93325 DOPPLER ECHO COLOR FLOW MAPG: CPT

## 2022-01-01 PROCEDURE — 71260 CT THORAX DX C+: CPT | Performed by: RADIOLOGY

## 2022-01-01 PROCEDURE — 258N000003 HC RX IP 258 OP 636: Performed by: PHYSICIAN ASSISTANT

## 2022-01-01 PROCEDURE — 82248 BILIRUBIN DIRECT: CPT | Performed by: NURSE PRACTITIONER

## 2022-01-01 PROCEDURE — 36415 COLL VENOUS BLD VENIPUNCTURE: CPT | Performed by: HOSPITALIST

## 2022-01-01 PROCEDURE — 77067 SCR MAMMO BI INCL CAD: CPT | Mod: TC | Performed by: RADIOLOGY

## 2022-01-01 PROCEDURE — 93325 DOPPLER ECHO COLOR FLOW MAPG: CPT | Mod: 26 | Performed by: INTERNAL MEDICINE

## 2022-01-01 PROCEDURE — 51798 US URINE CAPACITY MEASURE: CPT | Performed by: EMERGENCY MEDICINE

## 2022-01-01 PROCEDURE — C9113 INJ PANTOPRAZOLE SODIUM, VIA: HCPCS | Performed by: PHYSICIAN ASSISTANT

## 2022-01-01 PROCEDURE — U0005 INFEC AGEN DETEC AMPLI PROBE: HCPCS

## 2022-01-01 PROCEDURE — 250N000013 HC RX MED GY IP 250 OP 250 PS 637: Performed by: EMERGENCY MEDICINE

## 2022-01-01 PROCEDURE — G8907 PT DOC NO EVENTS ON DISCHARG: HCPCS

## 2022-01-01 PROCEDURE — G8918 PT W/O PREOP ORDER IV AB PRO: HCPCS

## 2022-01-01 PROCEDURE — HZ2ZZZZ DETOXIFICATION SERVICES FOR SUBSTANCE ABUSE TREATMENT: ICD-10-PCS | Performed by: STUDENT IN AN ORGANIZED HEALTH CARE EDUCATION/TRAINING PROGRAM

## 2022-01-01 PROCEDURE — 99215 OFFICE O/P EST HI 40 MIN: CPT | Mod: 95 | Performed by: STUDENT IN AN ORGANIZED HEALTH CARE EDUCATION/TRAINING PROGRAM

## 2022-01-01 PROCEDURE — 36415 COLL VENOUS BLD VENIPUNCTURE: CPT | Performed by: PATHOLOGY

## 2022-01-01 PROCEDURE — 71045 X-RAY EXAM CHEST 1 VIEW: CPT

## 2022-01-01 PROCEDURE — 85610 PROTHROMBIN TIME: CPT | Performed by: PHYSICIAN ASSISTANT

## 2022-01-01 PROCEDURE — 99000 SPECIMEN HANDLING OFFICE-LAB: CPT | Performed by: PATHOLOGY

## 2022-01-01 PROCEDURE — 99223 1ST HOSP IP/OBS HIGH 75: CPT | Mod: AI | Performed by: INTERNAL MEDICINE

## 2022-01-01 PROCEDURE — 80053 COMPREHEN METABOLIC PANEL: CPT | Performed by: PHYSICIAN ASSISTANT

## 2022-01-01 PROCEDURE — 83605 ASSAY OF LACTIC ACID: CPT | Performed by: PHYSICIAN ASSISTANT

## 2022-01-01 PROCEDURE — 85025 COMPLETE CBC W/AUTO DIFF WBC: CPT | Performed by: PHYSICIAN ASSISTANT

## 2022-01-01 PROCEDURE — 86923 COMPATIBILITY TEST ELECTRIC: CPT | Performed by: HOSPITALIST

## 2022-01-01 PROCEDURE — P9047 ALBUMIN (HUMAN), 25%, 50ML: HCPCS | Performed by: EMERGENCY MEDICINE

## 2022-01-01 PROCEDURE — 99233 SBSQ HOSP IP/OBS HIGH 50: CPT | Performed by: STUDENT IN AN ORGANIZED HEALTH CARE EDUCATION/TRAINING PROGRAM

## 2022-01-01 PROCEDURE — 96367 TX/PROPH/DG ADDL SEQ IV INF: CPT | Performed by: EMERGENCY MEDICINE

## 2022-01-01 PROCEDURE — 36569 INSJ PICC 5 YR+ W/O IMAGING: CPT

## 2022-01-01 PROCEDURE — P9016 RBC LEUKOCYTES REDUCED: HCPCS | Performed by: HOSPITALIST

## 2022-01-01 PROCEDURE — 90732 PPSV23 VACC 2 YRS+ SUBQ/IM: CPT | Performed by: PHYSICIAN ASSISTANT

## 2022-01-01 PROCEDURE — G0296 VISIT TO DETERM LDCT ELIG: HCPCS | Performed by: PHYSICIAN ASSISTANT

## 2022-01-01 PROCEDURE — 74177 CT ABD & PELVIS W/CONTRAST: CPT | Performed by: RADIOLOGY

## 2022-01-01 PROCEDURE — P9016 RBC LEUKOCYTES REDUCED: HCPCS | Performed by: EMERGENCY MEDICINE

## 2022-01-01 PROCEDURE — 36415 COLL VENOUS BLD VENIPUNCTURE: CPT | Performed by: STUDENT IN AN ORGANIZED HEALTH CARE EDUCATION/TRAINING PROGRAM

## 2022-01-01 PROCEDURE — 99223 1ST HOSP IP/OBS HIGH 75: CPT | Performed by: STUDENT IN AN ORGANIZED HEALTH CARE EDUCATION/TRAINING PROGRAM

## 2022-01-01 PROCEDURE — 84484 ASSAY OF TROPONIN QUANT: CPT | Performed by: EMERGENCY MEDICINE

## 2022-01-01 PROCEDURE — 99396 PREV VISIT EST AGE 40-64: CPT | Performed by: PHYSICIAN ASSISTANT

## 2022-01-01 PROCEDURE — 99223 1ST HOSP IP/OBS HIGH 75: CPT | Performed by: INTERNAL MEDICINE

## 2022-01-01 PROCEDURE — 120N000002 HC R&B MED SURG/OB UMMC

## 2022-01-01 PROCEDURE — 74181 MRI ABDOMEN W/O CONTRAST: CPT | Performed by: RADIOLOGY

## 2022-01-01 PROCEDURE — 84155 ASSAY OF PROTEIN SERUM: CPT

## 2022-01-01 PROCEDURE — 82248 BILIRUBIN DIRECT: CPT | Performed by: PHYSICIAN ASSISTANT

## 2022-01-01 PROCEDURE — 99285 EMERGENCY DEPT VISIT HI MDM: CPT | Mod: 25 | Performed by: EMERGENCY MEDICINE

## 2022-01-01 PROCEDURE — 84550 ASSAY OF BLOOD/URIC ACID: CPT | Performed by: STUDENT IN AN ORGANIZED HEALTH CARE EDUCATION/TRAINING PROGRAM

## 2022-01-01 PROCEDURE — P9047 ALBUMIN (HUMAN), 25%, 50ML: HCPCS | Performed by: HOSPITALIST

## 2022-01-01 PROCEDURE — 83735 ASSAY OF MAGNESIUM: CPT | Performed by: PHYSICIAN ASSISTANT

## 2022-01-01 PROCEDURE — 36430 TRANSFUSION BLD/BLD COMPNT: CPT | Performed by: EMERGENCY MEDICINE

## 2022-01-01 PROCEDURE — 88341 IMHCHEM/IMCYTCHM EA ADD ANTB: CPT | Performed by: PATHOLOGY

## 2022-01-01 PROCEDURE — 97535 SELF CARE MNGMENT TRAINING: CPT | Mod: GO | Performed by: OCCUPATIONAL THERAPIST

## 2022-01-01 PROCEDURE — 45331 SIGMOIDOSCOPY AND BIOPSY: CPT | Mod: XS

## 2022-01-01 PROCEDURE — 84550 ASSAY OF BLOOD/URIC ACID: CPT | Performed by: PHYSICIAN ASSISTANT

## 2022-01-01 PROCEDURE — 84100 ASSAY OF PHOSPHORUS: CPT | Performed by: INTERNAL MEDICINE

## 2022-01-01 PROCEDURE — 87086 URINE CULTURE/COLONY COUNT: CPT | Performed by: EMERGENCY MEDICINE

## 2022-01-01 PROCEDURE — 85014 HEMATOCRIT: CPT

## 2022-01-01 PROCEDURE — 85384 FIBRINOGEN ACTIVITY: CPT | Performed by: PHYSICIAN ASSISTANT

## 2022-01-01 PROCEDURE — 97165 OT EVAL LOW COMPLEX 30 MIN: CPT | Mod: GO | Performed by: OCCUPATIONAL THERAPIST

## 2022-01-01 PROCEDURE — 99254 IP/OBS CNSLTJ NEW/EST MOD 60: CPT | Mod: GC | Performed by: SURGERY

## 2022-01-01 PROCEDURE — 97530 THERAPEUTIC ACTIVITIES: CPT | Mod: GO | Performed by: OCCUPATIONAL THERAPIST

## 2022-01-01 PROCEDURE — U0005 INFEC AGEN DETEC AMPLI PROBE: HCPCS | Mod: 90 | Performed by: PATHOLOGY

## 2022-01-01 PROCEDURE — 250N000011 HC RX IP 250 OP 636

## 2022-01-01 PROCEDURE — 258N000001 HC RX 258: Performed by: EMERGENCY MEDICINE

## 2022-01-01 PROCEDURE — 87040 BLOOD CULTURE FOR BACTERIA: CPT | Performed by: PHYSICIAN ASSISTANT

## 2022-01-01 PROCEDURE — 93321 DOPPLER ECHO F-UP/LMTD STD: CPT | Mod: 26 | Performed by: INTERNAL MEDICINE

## 2022-01-01 PROCEDURE — 84450 TRANSFERASE (AST) (SGOT): CPT

## 2022-01-01 PROCEDURE — 96368 THER/DIAG CONCURRENT INF: CPT | Performed by: EMERGENCY MEDICINE

## 2022-01-01 PROCEDURE — G0145 SCR C/V CYTO,THINLAYER,RESCR: HCPCS | Performed by: PHYSICIAN ASSISTANT

## 2022-01-01 PROCEDURE — 93308 TTE F-UP OR LMTD: CPT | Mod: 26 | Performed by: INTERNAL MEDICINE

## 2022-01-01 PROCEDURE — 82803 BLOOD GASES ANY COMBINATION: CPT | Performed by: PHYSICIAN ASSISTANT

## 2022-01-01 PROCEDURE — 85730 THROMBOPLASTIN TIME PARTIAL: CPT | Performed by: PHYSICIAN ASSISTANT

## 2022-01-01 RX ORDER — FLUMAZENIL 0.1 MG/ML
0.2 INJECTION, SOLUTION INTRAVENOUS
Status: DISCONTINUED | OUTPATIENT
Start: 2022-01-01 | End: 2022-01-01 | Stop reason: HOSPADM

## 2022-01-01 RX ORDER — LORAZEPAM 0.5 MG/1
1 TABLET ORAL
Status: DISCONTINUED | OUTPATIENT
Start: 2022-01-01 | End: 2022-01-01 | Stop reason: HOSPADM

## 2022-01-01 RX ORDER — DEXTROSE MONOHYDRATE 25 G/50ML
25-50 INJECTION, SOLUTION INTRAVENOUS
Status: DISCONTINUED | OUTPATIENT
Start: 2022-01-01 | End: 2022-01-01 | Stop reason: HOSPADM

## 2022-01-01 RX ORDER — FENTANYL CITRATE 50 UG/ML
INJECTION, SOLUTION INTRAMUSCULAR; INTRAVENOUS PRN
Status: DISCONTINUED | OUTPATIENT
Start: 2022-01-01 | End: 2022-01-01 | Stop reason: HOSPADM

## 2022-01-01 RX ORDER — ALBUTEROL SULFATE 90 UG/1
1-2 AEROSOL, METERED RESPIRATORY (INHALATION)
Status: CANCELLED
Start: 2022-05-19

## 2022-01-01 RX ORDER — HEPARIN SODIUM,PORCINE 10 UNIT/ML
5 VIAL (ML) INTRAVENOUS
Status: CANCELLED | OUTPATIENT
Start: 2022-06-02

## 2022-01-01 RX ORDER — LIDOCAINE 40 MG/G
CREAM TOPICAL
Status: DISCONTINUED | OUTPATIENT
Start: 2022-01-01 | End: 2022-01-01 | Stop reason: HOSPADM

## 2022-01-01 RX ORDER — FUROSEMIDE 10 MG/ML
40 INJECTION INTRAMUSCULAR; INTRAVENOUS ONCE
Status: DISCONTINUED | OUTPATIENT
Start: 2022-01-01 | End: 2022-01-01 | Stop reason: HOSPADM

## 2022-01-01 RX ORDER — HEPARIN SODIUM (PORCINE) LOCK FLUSH IV SOLN 100 UNIT/ML 100 UNIT/ML
5 SOLUTION INTRAVENOUS
Status: CANCELLED | OUTPATIENT
Start: 2022-06-18

## 2022-01-01 RX ORDER — ALBUTEROL SULFATE 5 MG/ML
10 SOLUTION RESPIRATORY (INHALATION) ONCE
Status: DISCONTINUED | OUTPATIENT
Start: 2022-01-01 | End: 2022-01-01 | Stop reason: HOSPADM

## 2022-01-01 RX ORDER — ARIPIPRAZOLE 5 MG/1
5 TABLET ORAL DAILY
Status: DISCONTINUED | OUTPATIENT
Start: 2022-01-01 | End: 2022-01-01 | Stop reason: HOSPADM

## 2022-01-01 RX ORDER — HEPARIN SODIUM (PORCINE) LOCK FLUSH IV SOLN 100 UNIT/ML 100 UNIT/ML
5 SOLUTION INTRAVENOUS
Status: CANCELLED | OUTPATIENT
Start: 2022-05-19

## 2022-01-01 RX ORDER — ONDANSETRON 2 MG/ML
8 INJECTION INTRAMUSCULAR; INTRAVENOUS ONCE
Status: CANCELLED | OUTPATIENT
Start: 2022-05-19

## 2022-01-01 RX ORDER — LANOLIN ALCOHOL/MO/W.PET/CERES
100 CREAM (GRAM) TOPICAL DAILY
Qty: 30 TABLET | Refills: 0 | Status: SHIPPED | OUTPATIENT
Start: 2022-01-01

## 2022-01-01 RX ORDER — DIPHENHYDRAMINE HCL 25 MG
25 CAPSULE ORAL EVERY 6 HOURS PRN
Status: DISCONTINUED | OUTPATIENT
Start: 2022-01-01 | End: 2022-01-01

## 2022-01-01 RX ORDER — LORAZEPAM 0.5 MG/1
1-2 TABLET ORAL EVERY 30 MIN PRN
Status: DISCONTINUED | OUTPATIENT
Start: 2022-01-01 | End: 2022-01-01 | Stop reason: HOSPADM

## 2022-01-01 RX ORDER — ARIPIPRAZOLE 5 MG/1
1 TABLET ORAL DAILY
COMMUNITY
Start: 2022-01-01

## 2022-01-01 RX ORDER — GADOBUTROL 604.72 MG/ML
7.5 INJECTION INTRAVENOUS ONCE
Status: DISCONTINUED | OUTPATIENT
Start: 2022-01-01 | End: 2022-01-01

## 2022-01-01 RX ORDER — ONDANSETRON 4 MG/1
4 TABLET, ORALLY DISINTEGRATING ORAL EVERY 6 HOURS PRN
Status: DISCONTINUED | OUTPATIENT
Start: 2022-01-01 | End: 2022-01-01 | Stop reason: HOSPADM

## 2022-01-01 RX ORDER — HEPARIN SODIUM,PORCINE 10 UNIT/ML
5 VIAL (ML) INTRAVENOUS
Status: CANCELLED | OUTPATIENT
Start: 2022-05-19

## 2022-01-01 RX ORDER — CEFTRIAXONE 1 G/1
1 INJECTION, POWDER, FOR SOLUTION INTRAMUSCULAR; INTRAVENOUS ONCE
Status: COMPLETED | OUTPATIENT
Start: 2022-01-01 | End: 2022-01-01

## 2022-01-01 RX ORDER — ONDANSETRON 2 MG/ML
8 INJECTION INTRAMUSCULAR; INTRAVENOUS ONCE
Status: CANCELLED | OUTPATIENT
Start: 2022-06-02

## 2022-01-01 RX ORDER — ARIPIPRAZOLE 5 MG/1
10 TABLET ORAL DAILY
Status: DISCONTINUED | OUTPATIENT
Start: 2022-01-01 | End: 2022-01-01 | Stop reason: HOSPADM

## 2022-01-01 RX ORDER — DIPHENHYDRAMINE HYDROCHLORIDE 50 MG/ML
50 INJECTION INTRAMUSCULAR; INTRAVENOUS
Status: CANCELLED
Start: 2022-06-16

## 2022-01-01 RX ORDER — HEPARIN SODIUM,PORCINE 10 UNIT/ML
5 VIAL (ML) INTRAVENOUS
Status: CANCELLED | OUTPATIENT
Start: 2022-06-18

## 2022-01-01 RX ORDER — SIMVASTATIN 40 MG
40 TABLET ORAL AT BEDTIME
Qty: 90 TABLET | Refills: 3 | Status: SHIPPED | OUTPATIENT
Start: 2022-01-01

## 2022-01-01 RX ORDER — LORAZEPAM 2 MG/ML
1 INJECTION INTRAMUSCULAR
Status: DISCONTINUED | OUTPATIENT
Start: 2022-01-01 | End: 2022-01-01 | Stop reason: HOSPADM

## 2022-01-01 RX ORDER — ACETAMINOPHEN 325 MG/1
325 TABLET ORAL EVERY 4 HOURS PRN
Status: DISCONTINUED | OUTPATIENT
Start: 2022-01-01 | End: 2022-01-01

## 2022-01-01 RX ORDER — NALOXONE HYDROCHLORIDE 0.4 MG/ML
0.1 INJECTION, SOLUTION INTRAMUSCULAR; INTRAVENOUS; SUBCUTANEOUS
Status: DISCONTINUED | OUTPATIENT
Start: 2022-01-01 | End: 2022-01-01 | Stop reason: HOSPADM

## 2022-01-01 RX ORDER — ALBUTEROL SULFATE 90 UG/1
1-2 AEROSOL, METERED RESPIRATORY (INHALATION)
Status: CANCELLED
Start: 2022-06-16

## 2022-01-01 RX ORDER — HYDROMORPHONE HYDROCHLORIDE 1 MG/ML
.5-1 INJECTION, SOLUTION INTRAMUSCULAR; INTRAVENOUS; SUBCUTANEOUS
Status: DISCONTINUED | OUTPATIENT
Start: 2022-01-01 | End: 2022-01-01

## 2022-01-01 RX ORDER — SALIVA STIMULANT COMB. NO.3
1 SPRAY, NON-AEROSOL (ML) MUCOUS MEMBRANE
Status: DISCONTINUED | OUTPATIENT
Start: 2022-01-01 | End: 2022-01-01 | Stop reason: HOSPADM

## 2022-01-01 RX ORDER — NALOXONE HYDROCHLORIDE 0.4 MG/ML
0.2 INJECTION, SOLUTION INTRAMUSCULAR; INTRAVENOUS; SUBCUTANEOUS
Status: CANCELLED | OUTPATIENT
Start: 2022-06-02

## 2022-01-01 RX ORDER — DEXAMETHASONE 4 MG/1
8 TABLET ORAL DAILY
Qty: 4 TABLET | Refills: 2 | Status: SHIPPED | OUTPATIENT
Start: 2022-05-18

## 2022-01-01 RX ORDER — DEXTROSE MONOHYDRATE 25 G/50ML
25 INJECTION, SOLUTION INTRAVENOUS ONCE
Status: COMPLETED | OUTPATIENT
Start: 2022-01-01 | End: 2022-01-01

## 2022-01-01 RX ORDER — NOREPINEPHRINE BITARTRATE 0.06 MG/ML
.01-.6 INJECTION, SOLUTION INTRAVENOUS CONTINUOUS
Status: DISCONTINUED | OUTPATIENT
Start: 2022-01-01 | End: 2022-01-01

## 2022-01-01 RX ORDER — HEPARIN SODIUM (PORCINE) LOCK FLUSH IV SOLN 100 UNIT/ML 100 UNIT/ML
5 SOLUTION INTRAVENOUS
Status: CANCELLED | OUTPATIENT
Start: 2022-05-21

## 2022-01-01 RX ORDER — NAPROXEN SODIUM 220 MG
220 TABLET ORAL 2 TIMES DAILY WITH MEALS
Qty: 60 TABLET | Refills: 1 | Status: ON HOLD | OUTPATIENT
Start: 2022-01-01 | End: 2022-01-01

## 2022-01-01 RX ORDER — PIPERACILLIN SODIUM, TAZOBACTAM SODIUM 4; .5 G/20ML; G/20ML
4.5 INJECTION, POWDER, LYOPHILIZED, FOR SOLUTION INTRAVENOUS EVERY 6 HOURS
Status: DISCONTINUED | OUTPATIENT
Start: 2022-01-01 | End: 2022-01-01 | Stop reason: HOSPADM

## 2022-01-01 RX ORDER — ALBUMIN (HUMAN) 12.5 G/50ML
25 SOLUTION INTRAVENOUS ONCE
Status: COMPLETED | OUTPATIENT
Start: 2022-01-01 | End: 2022-01-01

## 2022-01-01 RX ORDER — LORAZEPAM 2 MG/ML
1-2 INJECTION INTRAMUSCULAR EVERY 30 MIN PRN
Status: DISCONTINUED | OUTPATIENT
Start: 2022-01-01 | End: 2022-01-01

## 2022-01-01 RX ORDER — HEPARIN SODIUM (PORCINE) LOCK FLUSH IV SOLN 100 UNIT/ML 100 UNIT/ML
5 SOLUTION INTRAVENOUS
Status: CANCELLED | OUTPATIENT
Start: 2022-06-04

## 2022-01-01 RX ORDER — HEPARIN SODIUM,PORCINE 10 UNIT/ML
5 VIAL (ML) INTRAVENOUS
Status: CANCELLED | OUTPATIENT
Start: 2022-06-16

## 2022-01-01 RX ORDER — AMOXICILLIN 250 MG
1 CAPSULE ORAL 2 TIMES DAILY PRN
Status: DISCONTINUED | OUTPATIENT
Start: 2022-01-01 | End: 2022-01-01 | Stop reason: HOSPADM

## 2022-01-01 RX ORDER — LAMOTRIGINE 25 MG/1
25 TABLET ORAL DAILY
Qty: 30 TABLET | Refills: 1 | Status: SHIPPED | OUTPATIENT
Start: 2022-01-01 | End: 2022-01-01

## 2022-01-01 RX ORDER — SULFAMETHOXAZOLE/TRIMETHOPRIM 800-160 MG
1 TABLET ORAL 2 TIMES DAILY
Qty: 20 TABLET | Refills: 0 | Status: SHIPPED | OUTPATIENT
Start: 2022-01-01 | End: 2022-01-01

## 2022-01-01 RX ORDER — NICOTINE POLACRILEX 4 MG
15-30 LOZENGE BUCCAL
Status: DISCONTINUED | OUTPATIENT
Start: 2022-01-01 | End: 2022-01-01 | Stop reason: HOSPADM

## 2022-01-01 RX ORDER — MEPERIDINE HYDROCHLORIDE 25 MG/ML
25 INJECTION INTRAMUSCULAR; INTRAVENOUS; SUBCUTANEOUS EVERY 30 MIN PRN
Status: CANCELLED | OUTPATIENT
Start: 2022-05-19

## 2022-01-01 RX ORDER — ATROPINE SULFATE 10 MG/ML
2 SOLUTION/ DROPS OPHTHALMIC EVERY 4 HOURS PRN
Status: DISCONTINUED | OUTPATIENT
Start: 2022-01-01 | End: 2022-01-01 | Stop reason: HOSPADM

## 2022-01-01 RX ORDER — ALBUTEROL SULFATE 0.83 MG/ML
2.5 SOLUTION RESPIRATORY (INHALATION)
Status: CANCELLED | OUTPATIENT
Start: 2022-05-19

## 2022-01-01 RX ORDER — ONDANSETRON 2 MG/ML
4 INJECTION INTRAMUSCULAR; INTRAVENOUS
Status: DISCONTINUED | OUTPATIENT
Start: 2022-01-01 | End: 2022-01-01 | Stop reason: HOSPADM

## 2022-01-01 RX ORDER — LORAZEPAM 2 MG/ML
0.5 INJECTION INTRAMUSCULAR EVERY 4 HOURS PRN
Status: CANCELLED | OUTPATIENT
Start: 2022-06-16

## 2022-01-01 RX ORDER — SODIUM CHLORIDE, SODIUM LACTATE, POTASSIUM CHLORIDE, CALCIUM CHLORIDE 600; 310; 30; 20 MG/100ML; MG/100ML; MG/100ML; MG/100ML
INJECTION, SOLUTION INTRAVENOUS CONTINUOUS
Status: ACTIVE | OUTPATIENT
Start: 2022-01-01 | End: 2022-01-01

## 2022-01-01 RX ORDER — ONDANSETRON 8 MG/1
8 TABLET, FILM COATED ORAL EVERY 8 HOURS PRN
Qty: 30 TABLET | Refills: 2 | Status: SHIPPED | OUTPATIENT
Start: 2022-05-18

## 2022-01-01 RX ORDER — ONDANSETRON 2 MG/ML
4 INJECTION INTRAMUSCULAR; INTRAVENOUS EVERY 6 HOURS PRN
Status: DISCONTINUED | OUTPATIENT
Start: 2022-01-01 | End: 2022-01-01 | Stop reason: HOSPADM

## 2022-01-01 RX ORDER — NALOXONE HYDROCHLORIDE 0.4 MG/ML
0.2 INJECTION, SOLUTION INTRAMUSCULAR; INTRAVENOUS; SUBCUTANEOUS
Status: CANCELLED | OUTPATIENT
Start: 2022-06-16

## 2022-01-01 RX ORDER — HEPARIN SODIUM,PORCINE 10 UNIT/ML
5-20 VIAL (ML) INTRAVENOUS
Status: DISCONTINUED | OUTPATIENT
Start: 2022-01-01 | End: 2022-01-01 | Stop reason: HOSPADM

## 2022-01-01 RX ORDER — DIPHENHYDRAMINE HYDROCHLORIDE 50 MG/ML
50 INJECTION INTRAMUSCULAR; INTRAVENOUS
Status: CANCELLED
Start: 2022-05-19

## 2022-01-01 RX ORDER — OXYCODONE HYDROCHLORIDE 5 MG/1
5 TABLET ORAL EVERY 4 HOURS PRN
Status: DISCONTINUED | OUTPATIENT
Start: 2022-01-01 | End: 2022-01-01 | Stop reason: HOSPADM

## 2022-01-01 RX ORDER — OLANZAPINE 5 MG/1
5 TABLET, ORALLY DISINTEGRATING ORAL EVERY 6 HOURS PRN
Status: DISCONTINUED | OUTPATIENT
Start: 2022-01-01 | End: 2022-01-01 | Stop reason: HOSPADM

## 2022-01-01 RX ORDER — ACETAMINOPHEN 500 MG
500 TABLET ORAL EVERY 6 HOURS
Status: DISCONTINUED | OUTPATIENT
Start: 2022-01-01 | End: 2022-01-01

## 2022-01-01 RX ORDER — HEPARIN SODIUM (PORCINE) LOCK FLUSH IV SOLN 100 UNIT/ML 100 UNIT/ML
5 SOLUTION INTRAVENOUS
Status: CANCELLED | OUTPATIENT
Start: 2022-06-02

## 2022-01-01 RX ORDER — POTASSIUM CHLORIDE 750 MG/1
20 TABLET, EXTENDED RELEASE ORAL ONCE
Status: COMPLETED | OUTPATIENT
Start: 2022-01-01 | End: 2022-01-01

## 2022-01-01 RX ORDER — POTASSIUM CHLORIDE 750 MG/1
40 TABLET, EXTENDED RELEASE ORAL ONCE
Status: COMPLETED | OUTPATIENT
Start: 2022-01-01 | End: 2022-01-01

## 2022-01-01 RX ORDER — METHYLPREDNISOLONE SODIUM SUCCINATE 125 MG/2ML
125 INJECTION, POWDER, LYOPHILIZED, FOR SOLUTION INTRAMUSCULAR; INTRAVENOUS
Status: CANCELLED
Start: 2022-06-16

## 2022-01-01 RX ORDER — NALOXONE HYDROCHLORIDE 0.4 MG/ML
0.2 INJECTION, SOLUTION INTRAMUSCULAR; INTRAVENOUS; SUBCUTANEOUS
Status: DISCONTINUED | OUTPATIENT
Start: 2022-01-01 | End: 2022-01-01 | Stop reason: HOSPADM

## 2022-01-01 RX ORDER — POTASSIUM CHLORIDE 750 MG/1
20 TABLET, EXTENDED RELEASE ORAL ONCE
Status: DISCONTINUED | OUTPATIENT
Start: 2022-01-01 | End: 2022-01-01 | Stop reason: CLARIF

## 2022-01-01 RX ORDER — TRIAMCINOLONE ACETONIDE 5 MG/G
OINTMENT TOPICAL
Qty: 30 G | Refills: 1 | Status: SHIPPED | OUTPATIENT
Start: 2022-01-01 | End: 2022-01-01

## 2022-01-01 RX ORDER — HEPARIN SODIUM,PORCINE 10 UNIT/ML
5 VIAL (ML) INTRAVENOUS
Status: CANCELLED | OUTPATIENT
Start: 2022-06-04

## 2022-01-01 RX ORDER — IOPAMIDOL 755 MG/ML
127 INJECTION, SOLUTION INTRAVASCULAR ONCE
Status: COMPLETED | OUTPATIENT
Start: 2022-01-01 | End: 2022-01-01

## 2022-01-01 RX ORDER — MULTIPLE VITAMINS W/ MINERALS TAB 9MG-400MCG
1 TAB ORAL DAILY
Qty: 30 TABLET | Refills: 0 | Status: SHIPPED | OUTPATIENT
Start: 2022-01-01

## 2022-01-01 RX ORDER — ALBUTEROL SULFATE 0.83 MG/ML
2.5 SOLUTION RESPIRATORY (INHALATION)
Status: CANCELLED | OUTPATIENT
Start: 2022-06-16

## 2022-01-01 RX ORDER — HEPARIN SODIUM 5000 [USP'U]/.5ML
5000 INJECTION, SOLUTION INTRAVENOUS; SUBCUTANEOUS EVERY 12 HOURS
Status: DISCONTINUED | OUTPATIENT
Start: 2022-01-01 | End: 2022-01-01

## 2022-01-01 RX ORDER — FOLIC ACID 1 MG/1
1 TABLET ORAL DAILY
Status: DISCONTINUED | OUTPATIENT
Start: 2022-01-01 | End: 2022-01-01 | Stop reason: HOSPADM

## 2022-01-01 RX ORDER — METHYLPREDNISOLONE SODIUM SUCCINATE 125 MG/2ML
125 INJECTION, POWDER, LYOPHILIZED, FOR SOLUTION INTRAMUSCULAR; INTRAVENOUS
Status: CANCELLED
Start: 2022-06-02

## 2022-01-01 RX ORDER — HEPARIN SODIUM (PORCINE) LOCK FLUSH IV SOLN 100 UNIT/ML 100 UNIT/ML
5 SOLUTION INTRAVENOUS
Status: CANCELLED | OUTPATIENT
Start: 2022-06-16

## 2022-01-01 RX ORDER — ONDANSETRON 2 MG/ML
8 INJECTION INTRAMUSCULAR; INTRAVENOUS ONCE
Status: CANCELLED | OUTPATIENT
Start: 2022-06-16

## 2022-01-01 RX ORDER — EPINEPHRINE 1 MG/ML
0.3 INJECTION, SOLUTION INTRAMUSCULAR; SUBCUTANEOUS EVERY 5 MIN PRN
Status: CANCELLED | OUTPATIENT
Start: 2022-05-19

## 2022-01-01 RX ORDER — NALOXONE HYDROCHLORIDE 0.4 MG/ML
0.4 INJECTION, SOLUTION INTRAMUSCULAR; INTRAVENOUS; SUBCUTANEOUS
Status: DISCONTINUED | OUTPATIENT
Start: 2022-01-01 | End: 2022-01-01 | Stop reason: HOSPADM

## 2022-01-01 RX ORDER — NALOXONE HYDROCHLORIDE 0.4 MG/ML
0.2 INJECTION, SOLUTION INTRAMUSCULAR; INTRAVENOUS; SUBCUTANEOUS
Status: CANCELLED | OUTPATIENT
Start: 2022-05-19

## 2022-01-01 RX ORDER — PANTOPRAZOLE SODIUM 40 MG/1
40 TABLET, DELAYED RELEASE ORAL 2 TIMES DAILY
Qty: 60 TABLET | Refills: 0 | Status: SHIPPED | OUTPATIENT
Start: 2022-01-01

## 2022-01-01 RX ORDER — MEPERIDINE HYDROCHLORIDE 25 MG/ML
25 INJECTION INTRAMUSCULAR; INTRAVENOUS; SUBCUTANEOUS EVERY 30 MIN PRN
Status: CANCELLED | OUTPATIENT
Start: 2022-06-16

## 2022-01-01 RX ORDER — ALBUMIN (HUMAN) 12.5 G/50ML
12.5 SOLUTION INTRAVENOUS ONCE
Status: COMPLETED | OUTPATIENT
Start: 2022-01-01 | End: 2022-01-01

## 2022-01-01 RX ORDER — LORAZEPAM 2 MG/ML
0.5 INJECTION INTRAMUSCULAR EVERY 4 HOURS PRN
Status: CANCELLED | OUTPATIENT
Start: 2022-05-19

## 2022-01-01 RX ORDER — EPINEPHRINE 1 MG/ML
0.3 INJECTION, SOLUTION INTRAMUSCULAR; SUBCUTANEOUS EVERY 5 MIN PRN
Status: CANCELLED | OUTPATIENT
Start: 2022-06-02

## 2022-01-01 RX ORDER — ALBUTEROL SULFATE 0.83 MG/ML
2.5 SOLUTION RESPIRATORY (INHALATION)
Status: CANCELLED | OUTPATIENT
Start: 2022-06-02

## 2022-01-01 RX ORDER — POTASSIUM CHLORIDE 750 MG/1
40 TABLET, EXTENDED RELEASE ORAL ONCE
Status: DISCONTINUED | OUTPATIENT
Start: 2022-01-01 | End: 2022-01-01 | Stop reason: CLARIF

## 2022-01-01 RX ORDER — PROCHLORPERAZINE MALEATE 10 MG
10 TABLET ORAL EVERY 6 HOURS PRN
Status: DISCONTINUED | OUTPATIENT
Start: 2022-01-01 | End: 2022-01-01 | Stop reason: HOSPADM

## 2022-01-01 RX ORDER — ALBUMIN (HUMAN) 12.5 G/50ML
75 SOLUTION INTRAVENOUS ONCE
Status: COMPLETED | OUTPATIENT
Start: 2022-01-01 | End: 2022-01-01

## 2022-01-01 RX ORDER — URSODIOL 300 MG/1
300 CAPSULE ORAL 2 TIMES DAILY
Status: DISCONTINUED | OUTPATIENT
Start: 2022-01-01 | End: 2022-01-01 | Stop reason: HOSPADM

## 2022-01-01 RX ORDER — CEFTRIAXONE 1 G/1
1 INJECTION, POWDER, FOR SOLUTION INTRAMUSCULAR; INTRAVENOUS EVERY 24 HOURS
Status: DISCONTINUED | OUTPATIENT
Start: 2022-01-01 | End: 2022-01-01

## 2022-01-01 RX ORDER — HYDROXYZINE HYDROCHLORIDE 10 MG/1
10 TABLET, FILM COATED ORAL 3 TIMES DAILY PRN
Status: DISCONTINUED | OUTPATIENT
Start: 2022-01-01 | End: 2022-01-01 | Stop reason: HOSPADM

## 2022-01-01 RX ORDER — PROCHLORPERAZINE MALEATE 10 MG
10 TABLET ORAL EVERY 6 HOURS PRN
Qty: 30 TABLET | Refills: 2 | Status: SHIPPED | OUTPATIENT
Start: 2022-05-18

## 2022-01-01 RX ORDER — FUROSEMIDE 10 MG/ML
40 INJECTION INTRAMUSCULAR; INTRAVENOUS ONCE
Status: COMPLETED | OUTPATIENT
Start: 2022-01-01 | End: 2022-01-01

## 2022-01-01 RX ORDER — FOLIC ACID 1 MG/1
1 TABLET ORAL DAILY
Qty: 30 TABLET | Refills: 0 | Status: SHIPPED | OUTPATIENT
Start: 2022-01-01

## 2022-01-01 RX ORDER — HEPARIN SODIUM,PORCINE 10 UNIT/ML
5-20 VIAL (ML) INTRAVENOUS EVERY 24 HOURS
Status: DISCONTINUED | OUTPATIENT
Start: 2022-01-01 | End: 2022-01-01 | Stop reason: HOSPADM

## 2022-01-01 RX ORDER — HYDROMORPHONE HYDROCHLORIDE 1 MG/ML
.3-.5 INJECTION, SOLUTION INTRAMUSCULAR; INTRAVENOUS; SUBCUTANEOUS
Status: DISCONTINUED | OUTPATIENT
Start: 2022-01-01 | End: 2022-01-01 | Stop reason: HOSPADM

## 2022-01-01 RX ORDER — CALCIUM GLUCONATE 20 MG/ML
1 INJECTION, SOLUTION INTRAVENOUS ONCE
Status: COMPLETED | OUTPATIENT
Start: 2022-01-01 | End: 2022-01-01

## 2022-01-01 RX ORDER — METHYLPREDNISOLONE SODIUM SUCCINATE 125 MG/2ML
125 INJECTION, POWDER, LYOPHILIZED, FOR SOLUTION INTRAMUSCULAR; INTRAVENOUS
Status: CANCELLED
Start: 2022-05-19

## 2022-01-01 RX ORDER — IOPAMIDOL 755 MG/ML
96 INJECTION, SOLUTION INTRAVASCULAR ONCE
Status: COMPLETED | OUTPATIENT
Start: 2022-01-01 | End: 2022-01-01

## 2022-01-01 RX ORDER — AMOXICILLIN 250 MG
2 CAPSULE ORAL 2 TIMES DAILY PRN
Status: DISCONTINUED | OUTPATIENT
Start: 2022-01-01 | End: 2022-01-01 | Stop reason: HOSPADM

## 2022-01-01 RX ORDER — SODIUM CHLORIDE, SODIUM LACTATE, POTASSIUM CHLORIDE, CALCIUM CHLORIDE 600; 310; 30; 20 MG/100ML; MG/100ML; MG/100ML; MG/100ML
INJECTION, SOLUTION INTRAVENOUS CONTINUOUS
Status: DISCONTINUED | OUTPATIENT
Start: 2022-01-01 | End: 2022-01-01

## 2022-01-01 RX ORDER — MEPERIDINE HYDROCHLORIDE 25 MG/ML
25 INJECTION INTRAMUSCULAR; INTRAVENOUS; SUBCUTANEOUS EVERY 30 MIN PRN
Status: CANCELLED | OUTPATIENT
Start: 2022-06-02

## 2022-01-01 RX ORDER — LORAZEPAM 2 MG/ML
0.5 INJECTION INTRAMUSCULAR EVERY 4 HOURS PRN
Status: CANCELLED | OUTPATIENT
Start: 2022-06-02

## 2022-01-01 RX ORDER — EPINEPHRINE 1 MG/ML
0.3 INJECTION, SOLUTION INTRAMUSCULAR; SUBCUTANEOUS EVERY 5 MIN PRN
Status: CANCELLED | OUTPATIENT
Start: 2022-06-16

## 2022-01-01 RX ORDER — DIPHENHYDRAMINE HYDROCHLORIDE 50 MG/ML
50 INJECTION INTRAMUSCULAR; INTRAVENOUS
Status: CANCELLED
Start: 2022-06-02

## 2022-01-01 RX ORDER — LAMOTRIGINE 25 MG/1
TABLET ORAL
Qty: 30 TABLET | Refills: 1 | OUTPATIENT
Start: 2022-01-01

## 2022-01-01 RX ORDER — ALBUTEROL SULFATE 90 UG/1
1-2 AEROSOL, METERED RESPIRATORY (INHALATION)
Status: CANCELLED
Start: 2022-06-02

## 2022-01-01 RX ORDER — MULTIPLE VITAMINS W/ MINERALS TAB 9MG-400MCG
1 TAB ORAL DAILY
Status: DISCONTINUED | OUTPATIENT
Start: 2022-01-01 | End: 2022-01-01 | Stop reason: HOSPADM

## 2022-01-01 RX ORDER — HEPARIN SODIUM,PORCINE 10 UNIT/ML
5 VIAL (ML) INTRAVENOUS
Status: CANCELLED | OUTPATIENT
Start: 2022-05-21

## 2022-01-01 RX ORDER — POTASSIUM CHLORIDE 1.5 G/1.58G
40 POWDER, FOR SOLUTION ORAL DAILY
Qty: 4 PACKET | Refills: 0 | Status: SHIPPED | OUTPATIENT
Start: 2022-01-01 | End: 2022-01-01

## 2022-01-01 RX ADMIN — SODIUM CHLORIDE, POTASSIUM CHLORIDE, SODIUM LACTATE AND CALCIUM CHLORIDE: 600; 310; 30; 20 INJECTION, SOLUTION INTRAVENOUS at 09:05

## 2022-01-01 RX ADMIN — THIAMINE HCL TAB 100 MG 100 MG: 100 TAB at 08:41

## 2022-01-01 RX ADMIN — PHYTONADIONE 10 MG: 10 INJECTION, EMULSION INTRAMUSCULAR; INTRAVENOUS; SUBCUTANEOUS at 09:09

## 2022-01-01 RX ADMIN — FOLIC ACID 1 MG: 1 TABLET ORAL at 15:47

## 2022-01-01 RX ADMIN — SODIUM CHLORIDE, POTASSIUM CHLORIDE, SODIUM LACTATE AND CALCIUM CHLORIDE 500 ML: 600; 310; 30; 20 INJECTION, SOLUTION INTRAVENOUS at 15:49

## 2022-01-01 RX ADMIN — THIAMINE HCL TAB 100 MG 100 MG: 100 TAB at 15:48

## 2022-01-01 RX ADMIN — ARIPIPRAZOLE 5 MG: 5 TABLET ORAL at 08:42

## 2022-01-01 RX ADMIN — URSODIOL 300 MG: 300 CAPSULE ORAL at 12:05

## 2022-01-01 RX ADMIN — PIPERACILLIN SODIUM AND TAZOBACTAM SODIUM 4.5 G: 4; .5 INJECTION, POWDER, LYOPHILIZED, FOR SOLUTION INTRAVENOUS at 18:45

## 2022-01-01 RX ADMIN — PANTOPRAZOLE SODIUM 40 MG: 40 INJECTION, POWDER, FOR SOLUTION INTRAVENOUS at 20:32

## 2022-01-01 RX ADMIN — PIPERACILLIN SODIUM AND TAZOBACTAM SODIUM 4.5 G: 4; .5 INJECTION, POWDER, LYOPHILIZED, FOR SOLUTION INTRAVENOUS at 18:02

## 2022-01-01 RX ADMIN — SODIUM CHLORIDE, POTASSIUM CHLORIDE, SODIUM LACTATE AND CALCIUM CHLORIDE 1000 ML: 600; 310; 30; 20 INJECTION, SOLUTION INTRAVENOUS at 16:03

## 2022-01-01 RX ADMIN — VANCOMYCIN HYDROCHLORIDE 1500 MG: 500 INJECTION, POWDER, LYOPHILIZED, FOR SOLUTION INTRAVENOUS at 19:43

## 2022-01-01 RX ADMIN — ALBUMIN HUMAN 75 G: 0.25 SOLUTION INTRAVENOUS at 10:49

## 2022-01-01 RX ADMIN — IOPAMIDOL 96 ML: 755 INJECTION, SOLUTION INTRAVASCULAR at 10:00

## 2022-01-01 RX ADMIN — HYDROMORPHONE HYDROCHLORIDE 0.5 MG: 1 INJECTION, SOLUTION INTRAMUSCULAR; INTRAVENOUS; SUBCUTANEOUS at 13:18

## 2022-01-01 RX ADMIN — PANTOPRAZOLE SODIUM 40 MG: 40 INJECTION, POWDER, FOR SOLUTION INTRAVENOUS at 09:09

## 2022-01-01 RX ADMIN — FOLIC ACID 1 MG: 1 TABLET ORAL at 09:09

## 2022-01-01 RX ADMIN — PIPERACILLIN SODIUM AND TAZOBACTAM SODIUM 4.5 G: 4; .5 INJECTION, POWDER, LYOPHILIZED, FOR SOLUTION INTRAVENOUS at 01:27

## 2022-01-01 RX ADMIN — Medication 10 ML: at 19:43

## 2022-01-01 RX ADMIN — ARIPIPRAZOLE 10 MG: 5 TABLET ORAL at 09:08

## 2022-01-01 RX ADMIN — HYDROMORPHONE HYDROCHLORIDE 1 MG: 1 INJECTION, SOLUTION INTRAMUSCULAR; INTRAVENOUS; SUBCUTANEOUS at 10:52

## 2022-01-01 RX ADMIN — CALCIUM GLUCONATE 1 G: 20 INJECTION, SOLUTION INTRAVENOUS at 04:53

## 2022-01-01 RX ADMIN — SODIUM CHLORIDE 500 ML: 9 INJECTION, SOLUTION INTRAVENOUS at 03:24

## 2022-01-01 RX ADMIN — PANTOPRAZOLE SODIUM 40 MG: 40 INJECTION, POWDER, FOR SOLUTION INTRAVENOUS at 18:09

## 2022-01-01 RX ADMIN — SODIUM CHLORIDE, POTASSIUM CHLORIDE, SODIUM LACTATE AND CALCIUM CHLORIDE: 600; 310; 30; 20 INJECTION, SOLUTION INTRAVENOUS at 20:07

## 2022-01-01 RX ADMIN — THIAMINE HCL TAB 100 MG 100 MG: 100 TAB at 09:08

## 2022-01-01 RX ADMIN — PHYTONADIONE 10 MG: 10 INJECTION, EMULSION INTRAMUSCULAR; INTRAVENOUS; SUBCUTANEOUS at 08:51

## 2022-01-01 RX ADMIN — FUROSEMIDE 40 MG: 10 INJECTION, SOLUTION INTRAVENOUS at 09:48

## 2022-01-01 RX ADMIN — Medication 1 TABLET: at 08:41

## 2022-01-01 RX ADMIN — CEFTRIAXONE SODIUM 1 G: 1 INJECTION, POWDER, FOR SOLUTION INTRAMUSCULAR; INTRAVENOUS at 12:27

## 2022-01-01 RX ADMIN — ARIPIPRAZOLE 10 MG: 5 TABLET ORAL at 08:41

## 2022-01-01 RX ADMIN — PIPERACILLIN SODIUM AND TAZOBACTAM SODIUM 4.5 G: 4; .5 INJECTION, POWDER, LYOPHILIZED, FOR SOLUTION INTRAVENOUS at 23:18

## 2022-01-01 RX ADMIN — DEXTROSE MONOHYDRATE 300 ML: 100 INJECTION, SOLUTION INTRAVENOUS at 05:08

## 2022-01-01 RX ADMIN — Medication 1 TABLET: at 09:08

## 2022-01-01 RX ADMIN — SODIUM CHLORIDE, POTASSIUM CHLORIDE, SODIUM LACTATE AND CALCIUM CHLORIDE 1000 ML: 600; 310; 30; 20 INJECTION, SOLUTION INTRAVENOUS at 20:09

## 2022-01-01 RX ADMIN — DEXTROSE MONOHYDRATE 25 G: 25 INJECTION, SOLUTION INTRAVENOUS at 05:12

## 2022-01-01 RX ADMIN — POTASSIUM CHLORIDE 20 MEQ: 750 TABLET, EXTENDED RELEASE ORAL at 08:41

## 2022-01-01 RX ADMIN — PIPERACILLIN SODIUM AND TAZOBACTAM SODIUM 4.5 G: 4; .5 INJECTION, POWDER, LYOPHILIZED, FOR SOLUTION INTRAVENOUS at 12:33

## 2022-01-01 RX ADMIN — Medication 1 TABLET: at 15:47

## 2022-01-01 RX ADMIN — FOLIC ACID 1 MG: 1 TABLET ORAL at 08:41

## 2022-01-01 RX ADMIN — VANCOMYCIN HYDROCHLORIDE 1750 MG: 500 INJECTION, POWDER, LYOPHILIZED, FOR SOLUTION INTRAVENOUS at 20:44

## 2022-01-01 RX ADMIN — OCTREOTIDE ACETATE 50 MCG/HR: 200 INJECTION, SOLUTION INTRAVENOUS; SUBCUTANEOUS at 17:20

## 2022-01-01 RX ADMIN — POTASSIUM CHLORIDE 40 MEQ: 750 TABLET, EXTENDED RELEASE ORAL at 06:01

## 2022-01-01 RX ADMIN — CALCIUM GLUCONATE 1 G: 20 INJECTION, SOLUTION INTRAVENOUS at 08:43

## 2022-01-01 RX ADMIN — ALBUMIN HUMAN 25 G: 0.25 SOLUTION INTRAVENOUS at 02:43

## 2022-01-01 RX ADMIN — ALBUMIN HUMAN 12.5 G: 0.25 SOLUTION INTRAVENOUS at 05:37

## 2022-01-01 RX ADMIN — PHYTONADIONE 10 MG: 10 INJECTION, EMULSION INTRAMUSCULAR; INTRAVENOUS; SUBCUTANEOUS at 23:15

## 2022-01-01 RX ADMIN — PANTOPRAZOLE SODIUM 40 MG: 40 INJECTION, POWDER, FOR SOLUTION INTRAVENOUS at 19:43

## 2022-01-01 RX ADMIN — HUMAN INSULIN 8.12 UNITS: 100 INJECTION, SOLUTION SUBCUTANEOUS at 05:12

## 2022-01-01 RX ADMIN — PANTOPRAZOLE SODIUM 40 MG: 40 INJECTION, POWDER, FOR SOLUTION INTRAVENOUS at 08:51

## 2022-01-01 RX ADMIN — PIPERACILLIN SODIUM AND TAZOBACTAM SODIUM 4.5 G: 4; .5 INJECTION, POWDER, LYOPHILIZED, FOR SOLUTION INTRAVENOUS at 06:01

## 2022-01-01 RX ADMIN — PIPERACILLIN SODIUM AND TAZOBACTAM SODIUM 4.5 G: 4; .5 INJECTION, POWDER, LYOPHILIZED, FOR SOLUTION INTRAVENOUS at 05:55

## 2022-01-01 RX ADMIN — DIPHENHYDRAMINE HYDROCHLORIDE 25 MG: 25 CAPSULE ORAL at 10:18

## 2022-01-01 RX ADMIN — IOPAMIDOL 127 ML: 755 INJECTION, SOLUTION INTRAVENOUS at 21:34

## 2022-01-01 ASSESSMENT — ACTIVITIES OF DAILY LIVING (ADL)
ADLS_ACUITY_SCORE: 6
ADLS_ACUITY_SCORE: 14
TOILETING: 0-->INDEPENDENT
ADLS_ACUITY_SCORE: 14
ADLS_ACUITY_SCORE: 14
ADLS_ACUITY_SCORE: 6
TOILETING_ISSUES: NO
DIFFICULTY_EATING/SWALLOWING: NO
ADLS_ACUITY_SCORE: 6
ADLS_ACUITY_SCORE: 14
ADLS_ACUITY_SCORE: 6
ADLS_ACUITY_SCORE: 37
ADLS_ACUITY_SCORE: 6
WEAR_GLASSES_OR_BLIND: YES
NUMBER_OF_TIMES_PATIENT_HAS_FALLEN_WITHIN_LAST_SIX_MONTHS: 1
ADLS_ACUITY_SCORE: 6
ADLS_ACUITY_SCORE: 37
ADLS_ACUITY_SCORE: 6
ADLS_ACUITY_SCORE: 6
ADLS_ACUITY_SCORE: 14
DOING_ERRANDS_INDEPENDENTLY_DIFFICULTY: NO
ADLS_ACUITY_SCORE: 6
ADLS_ACUITY_SCORE: 14
FALL_HISTORY_WITHIN_LAST_SIX_MONTHS: YES
ADLS_ACUITY_SCORE: 6
ADLS_ACUITY_SCORE: 37
ADLS_ACUITY_SCORE: 6
ADLS_ACUITY_SCORE: 14
ADLS_ACUITY_SCORE: 14
TOILETING: 0-->INDEPENDENT
ADLS_ACUITY_SCORE: 6
ADLS_ACUITY_SCORE: 14
HEARING_DIFFICULTY_OR_DEAF: NO
CONCENTRATING,_REMEMBERING_OR_MAKING_DECISIONS_DIFFICULTY: NO
DRESSING/BATHING_DIFFICULTY: NO
ADLS_ACUITY_SCORE: 14
ADLS_ACUITY_SCORE: 37
ADLS_ACUITY_SCORE: 37
ADLS_ACUITY_SCORE: 6
ADLS_ACUITY_SCORE: 14
ADLS_ACUITY_SCORE: 37
ADLS_ACUITY_SCORE: 14
CHANGE_IN_FUNCTIONAL_STATUS_SINCE_ONSET_OF_CURRENT_ILLNESS/INJURY: NO
ADLS_ACUITY_SCORE: 6
WALKING_OR_CLIMBING_STAIRS_DIFFICULTY: NO
ADLS_ACUITY_SCORE: 14
DIFFICULTY_COMMUNICATING: NO
ADLS_ACUITY_SCORE: 6
ADLS_ACUITY_SCORE: 6
ADLS_ACUITY_SCORE: 14
ADLS_ACUITY_SCORE: 6
ADLS_ACUITY_SCORE: 37
ADLS_ACUITY_SCORE: 14
ADLS_ACUITY_SCORE: 14
ADLS_ACUITY_SCORE: 6

## 2022-01-01 ASSESSMENT — ENCOUNTER SYMPTOMS
WEAKNESS: 0
WEAKNESS: 1
PALPITATIONS: 0
ABDOMINAL PAIN: 0
BREAST MASS: 0
PARESTHESIAS: 0
DYSURIA: 0
FEVER: 0
SORE THROAT: 0
FREQUENCY: 0
MYALGIAS: 0
VOMITING: 0
SHORTNESS OF BREATH: 0
BRUISES/BLEEDS EASILY: 1
DYSURIA: 0
RHINORRHEA: 0
ABDOMINAL PAIN: 0
JOINT SWELLING: 0
HEMATURIA: 0
HEMATOCHEZIA: 0
HEADACHES: 0
COUGH: 0
HEARTBURN: 0
NERVOUS/ANXIOUS: 0
HEADACHES: 0
FEVER: 0
DIARRHEA: 0
CONFUSION: 0
EYE PAIN: 0
NAUSEA: 0
BACK PAIN: 0
COUGH: 0
CHILLS: 0
NAUSEA: 0
SHORTNESS OF BREATH: 1
DIZZINESS: 0
ARTHRALGIAS: 1
CONSTIPATION: 0

## 2022-01-01 ASSESSMENT — MIFFLIN-ST. JEOR: SCORE: 1347.73

## 2022-01-01 ASSESSMENT — PAIN SCALES - GENERAL: PAINLEVEL: MILD PAIN (2)

## 2022-02-01 PROBLEM — F31.81 BIPOLAR 2 DISORDER (H): Status: ACTIVE | Noted: 2022-01-01

## 2022-02-01 NOTE — PROGRESS NOTES
"  Assessment & Plan     Infection due to 2019 novel coronavirus    Bipolar 2 disorder (H)  - lamoTRIgine (LAMICTAL) 25 MG tablet; Take 1 tablet (25 mg) by mouth daily  - Adult Mental Health  Referral; Future    Acute bronchitis with symptoms > 10 days  - sulfamethoxazole-trimethoprim (BACTRIM DS) 800-160 MG tablet; Take 1 tablet by mouth 2 times daily for 10 days    Screen for colon cancer  - Adult Gastro Ref - Procedure Only; Future    Encounter for screening mammogram for breast cancer  - *MA Screening Digital Bilateral; Future         Tobacco Cessation:   reports that she has been smoking cigarettes and other. She has a 9.90 pack-year smoking history. She quit smokeless tobacco use about 6 years ago.  Tobacco Cessation Action Plan: Information offered: Patient not interested at this time    BMI:   Estimated body mass index is 26.73 kg/m  as calculated from the following:    Height as of this encounter: 1.663 m (5' 5.47\").    Weight as of this encounter: 73.9 kg (163 lb).           Return in about 4 weeks (around 3/1/2022) for Follow up, using a phone visit.    Conner Sandoval PA-C  [facility] ALEXX Augustin is a 56 year old who presents for the following health issues  accompanied by her self.    HPI     Patient presents with:  Covid Concern: was tested positive for COVID-19 on 01/06/2022. Still not feeling better. stills have mucus that is going down throat and coughing so much that ribs is hurting. bodyaches. body weakness       Patient notes that she is still productive of copious sputum.  Worse than usual.  Smoking and ETOH abuse reviewed.  Patient would like to start lamotrigine for Bipolar.   referral placed.      Review of Systems   Constitutional, HEENT, cardiovascular, pulmonary, gi and gu systems are negative, except as otherwise noted.      Objective    /60   Pulse (!) 122   Temp 98.6  F (37  C) (Oral)   Ht 1.663 m (5' 5.47\")   Wt 73.9 kg (163 " lb)   LMP 11/30/2012   SpO2 98%   BMI 26.73 kg/m    Body mass index is 26.73 kg/m .  Physical Exam   GENERAL: healthy, alert and no distress  NECK: no adenopathy, no asymmetry, masses, or scars and thyroid normal to palpation  RESP: lungs clear to auscultation - no rales, rhonchi or wheezes  CV: tachycardia, normal S1 S2, no S3 or S4, no murmur, click or rub, peripheral pulses strong and no peripheral edema  MS: no gross musculoskeletal defects noted, no edema  PSYCH: Psych: Alert and oriented times 3; coherent speech, normal   rate and volume, able to articulate logical thoughts, able   to abstract reason, no tangential thoughts, no hallucinations   or delusions  Her affect is congruent.

## 2022-02-01 NOTE — PATIENT INSTRUCTIONS
"  Patient Education   Coping with Life after COVID-19  Being in the hospital because of COVID-19 is scary. Going home can be scary, too. You may face changes to your life, the way you work or what you can eat.   It's hard to adjust to change, and it's normal to feel afraid, frustrated or even angry. These feelings usually go away over time. If your feelings don't start to get better, it's called \"adjustment disorder.\"   What signs should I look out for?  Adjustment disorder can happen to anyone in a time of stress. It makes it hard to cope with daily life. You may feel lonely or fight with loved ones, even if you're glad to be home.   Watch for these signs:    Fear or worry    Hard time focusing    Sadness or anger    Trouble talking to family or friends    Feeling like you don't fit in or isolating yourself    Problems with sleep    Drinking alcohol or taking drugs to cope  What can I do?  You can help yourself get better. Feeling you have control helps you move forward. You may wonder if you'll be able to do things you did before. Be patient. Do your best to make the most of every day. Try to build relationships, be as active as you can, eat right and keep a sense of humor. Avoid smoking and drinking too much alcohol.   Call your family doctor or clinic if you're not sure what to do. They can guide you to care or other services.  When should I get help?  Think about getting counseling if your sadness or frustration gets worse. Together with a trained counselor, you can talk about your problems adjusting to life after your hospital stay. You can come up with new ways to handle changes that give you more control.   Get help if you're thinking about hurting yourself. If you need help right away, call 911 or go to the nearest emergency room. You can also try the Crisis Text Line.  Crisis Text Line: 629-298 (http://www.crisistextline.org)  The Crisis Text Line serves anyone, in any crisis. It gives free, 24/7 support. " "Here's how it works:  1. Text HOME to 728121 from anywhere in the [place], anytime, about any type of crisis.  2. A live, trained Crisis Counselor will text you back quickly.  3. The volunteer Crisis Counselor can help you move from a \"hot moment\" to a \"cool moment.\" They can also help you work out a safety plan.  For informational purposes only. Not to replace the advice of your health care provider. Copyright   2020 [employer] [facility]. All rights reserved. Clinically reviewed by the Ambulatory COVID-19 Care Map Team. [facility] 232657 - 04/20.       Patient Education     Bipolar Disorder  Bipolar disorder is a serious, life-altering illness. It causes strong mood swings between depression and  zayra.  It used to be called \"manic depression.\" The mood swings are different from the normal ups and downs we all have in our lives. They are more severe and last longer. They can seriously interfere with work and relationships. These episodes are changes from our usual moods and behavior. Their severity can be mild, or drastic and explosive. Also, the time between feelings of depression and zayra vary with each person. It can be weeks, month, or even years.     In a manic episode, you may think fast and do things quickly. It may seem like you are getting a lot done. At first, it may feel very good. But in the extreme, zayra can lead to a lifestyle that is disorganized and chaotic. You may partake in risky behavior, such as spending sprees, sexual acting-out, or drug use. In later stages, you may have no interest in food and may be unable to sleep for days at a time. Your speech may speed up and become hard to understand. You may appear to others as if you are in your own world.    In a depressive episode, you may feel a lack of interest in normal activities. Sometimes there is sadness or guilt without any clear reason. Your thinking may become slow. You may also lack energy or feel hopeless. Some people have thoughts of " "harming themselves at this stage. Thoughts can even turn to suicide.  Between these phases, you may feel OK. This does not mean that the illness is gone. People with this disorder will often have to treat it all their life. Proper use of medicines and effective, on-going medical and psychological support can greatly reduce symptoms and enhance your quality of life.   The exact cause of bipolar disorder is unknown. But there is a genetic link that makes a person more likely to get it. Also, the use of illegal drugs such as speed (amphetamine) and cocaine raise a person's risk for this illness.   Home care  Here is what you can do at home:    Ongoing care and support can help you manage this disease. Find a healthcare provider and therapist who meet your needs. Seek help right away when you feel like you may be heading into either a manic episode or a depressive state.    Take your medicine and get regular blood work to check the levels of medicine in your body, as prescribed. Do it even if you think you don t need to do it.    Don't change or stop taking your medicine unless your healthcare provider says it is OK to do so.    Tell all your healthcare providers about all the prescriptions, over-the-counter medicines, and supplements you take. Certain supplements interact with medicines. They may cause dangerous side effects. You can also ask your pharmacist about medicine interactions.    Talk with your family and trusted friends about your thoughts and feelings. Ask them to help you notice behavior changes early. You can then get help and have your medicines adjusted, if needed. When you are feeling well, make a \"management plan\" for a trusted friend or family member to help you during hard times. For example, you can ask them to hold your credit cards for you if you overspend during a zayra. Or they can get emergency help for you if your depression leads to suicidal thoughts. If your illness is severe, consider " giving trusted people access to your healthcare providers. They can then work together to keep you safe.    Don't drink alcohol or use illegal drugs. They can bring on an episode and make it worse.    If your life is severely affected by this illness, the Americans with Disabilities Act (ADA) may provide help. The ADA protects people with chronic physical and mental health problems. If you are having trouble keeping jobs, managing workplace issues, or caring for yourself because of your bipolar disorder, contact your local ADA office to see if it can help. The U.S. Department of Justice has a toll-free ADA information line at 060-639-1397 (voice) or 532-088-5133 (TTY). It can help you find a local office. Or go to www.GuideWall.gov for more information.    Join an in-person or virtual support group for people with mental health problems.  Follow-up care  Follow up with your healthcare provider or therapist as advised. They can help you find ways to improve your life.   Call 911  Call 911 if you have:     Suicidal thoughts, a plan to harm yourself, and the means to do so    Serious thoughts of hurting someone else    Trouble breathing    Confusion    Drowsiness or trouble wakening    Fainting or loss of consciousness    Rapid heart rate, very low heart rate, or a new irregular heart rate    Seizure    New chest pain that becomes more severe, lasts longer, or spreads into your shoulder, arm, neck, jaw, or back  When to seek medical advice  Call your healthcare provider right away if any of these happen:    Feeling like your symptoms are getting worse (depression, agitation, or excessive energy)    Not eating or sleeping for more than 48 hours    Feeling out of control (racing thoughts, paranoid thoughts, hallucinations, or poor concentration)    Feeling like you want to harm yourself or another    Being unable to care for yourself  Todd last reviewed this educational content on 7/1/2020 2000-2021 [employer]. All rights reserved. This information is not intended as a substitute for professional medical care. Always follow your healthcare professional's instructions.

## 2022-03-11 NOTE — TELEPHONE ENCOUNTER
Screening Questions  BlueKIND OF PREP RedLOCATION [review exclusion criteria] GreenSEDATION TYPE    1. Have you had a positive covid test in the last 90 days?   Y - JAN 6, 2022       2. Are you active on mychart? Y    3. What insurance is in the chart? [employer]      3.  Ordering/Referring Provider: Conner Sandoval PA    4. BMI 25.0 [BMI OVER 40-EXTENDED PREP]  If greater than 40 review exclusion criteria [PAC APPT IF @ [facility]]    5.  Respiratory Screening :  [If yes to any of the following HOSPITAL setting only]     Do you use daily home oxygen? N    Do you have mod to severe Obstructive Sleep Apnea? N  [OKAY @ [facility] SH PH RI]   Do you have Pulmonary Hypertension? N     Do you have UNCONTROLLED asthma? N      6. Have you had a heart or lung transplant? N      7. Are you currently on dialysis? N [ If yes, G-PREP & HOSPITAL setting only]     8. Do you have chronic kidney disease? N [ If yes, G-PREP ]    9. Have you had a stroke or Transient ischemic attack (TIA) within 6 months? N (If yes, please review exclusion criteria)    10. In the past 6 months, have you had any heart related issues including cardiomyopathy or heart attack? N        If yes, did it require cardiac stenting or other implantable device? N      11. Do you have any implantable devices in your body (pacemaker, defib, LVAD)? N (If yes, please review exclusion criteria)    12. Do you take nitroglycerin? N   If yes, how often? N  (if yes, HOSPITAL setting ONLY)    13. Are you currently taking any blood thinners? N   [IF YES, INFORM PATIENT TO FOLLOW UP W/ ORDERING PROVIDER FOR BRIDGING INSTRUCTIONS]     14. Do you have a diagnosis of diabetes? N   [ If yes, G-PREP ]    15. [FEMALES] Are you currently pregnant?     If yes, how many weeks?     16. Are you taking any prescription pain medications on a routine schedule?  N  [ If yes, EXTENDED PREP.] [If yes, MAC]    17. Do you have any chemical dependencies such as alcohol, street drugs, or  methadone?  N [If yes, MAC]    18. Do you have any history of post-traumatic stress syndrome, severe anxiety or history of psychosis?  N  [If yes, MAC]    19. Do you transfer independently?  Y    20.  Do you have any issues with constipation?  N  [ If yes, EXTENDED PREP.]    21. Preferred LOCAL Pharmacy for Pre Prescription  [id number] - [place] - [address] AT [facility] & [address]  Scheduling Details      Caller : Demetria Goodrich    (Please ask for phone number if not scheduled by patient)    Type of Procedure Scheduled: COLONOSCOPY   Which Colonoscopy Prep was Sent?: ISRAEL BAUER CF PATIENTS & GROEN'S PATIENTS NEEDS EXTENDED PREP  Surgeon: JIMI   Date of Procedure: 04/22/2022  Location:  OR       Sedation Type: CS   Conscious Sedation- Needs  for 6 hours after the procedure  MAC/General-Needs  for 24 hours after procedure    Pre-op Required at [facility] [place], [place] and OR for MAC sedation: N  (advise patient they will need a pre-op prior to procedure -)      Informed patient they will need an adult  Y  Cannot take any type of public or medical transportation alone    Pre-Procedure Covid test to be completed at [facility] or Externally: Y    Confirmed Nurse will call to complete assessment Y    Additional comments: N

## 2022-03-11 NOTE — TELEPHONE ENCOUNTER
A user error has taken place: encounter opened in error, closed for administrative reasons.    Patient calling for locations we schedule at.

## 2022-04-01 NOTE — PROGRESS NOTES
SUBJECTIVE:   CC: Demetria Goodrich is an 56 year old woman who presents for preventive health visit.       Patient has been advised of split billing requirements and indicates understanding: Yes  Healthy Habits:     Getting at least 3 servings of Calcium per day:  Yes    Bi-annual eye exam:  Yes    Dental care twice a year:  Yes    Sleep apnea or symptoms of sleep apnea:  None    Diet:  Regular (no restrictions)    Frequency of exercise:  None    Taking medications regularly:  Yes    Barriers to taking medications:  None    Medication side effects:  None    PHQ-2 Total Score: 0    Additional concerns today:  No              Today's PHQ-2 Score:   PHQ-2 ( 1999 Pfizer) 4/1/2022   Q1: Little interest or pleasure in doing things 0   Q2: Feeling down, depressed or hopeless 0   PHQ-2 Score 0   PHQ-2 Total Score (12-17 Years)- Positive if 3 or more points; Administer PHQ-A if positive -   Q1: Little interest or pleasure in doing things Not at all   Q2: Feeling down, depressed or hopeless Not at all   PHQ-2 Score 0       Abuse: Current or Past (Physical, Sexual or Emotional) - No  Do you feel safe in your environment? Yes    Have you ever done Advance Care Planning? (For example, a Health Directive, POLST, or a discussion with a medical provider or your loved ones about your wishes):     Social History     Tobacco Use     Smoking status: Current Every Day Smoker     Packs/day: 0.30     Years: 33.00     Pack years: 9.90     Types: Cigarettes, Other     Smokeless tobacco: Former User     Quit date: 3/13/2015     Tobacco comment: have Chantex prescription   Substance Use Topics     Alcohol use: Yes     Comment: 12 pack per week     If you drink alcohol do you typically have >3 drinks per day or >7 drinks per week? Yes      Alcohol Use 4/1/2022   Prescreen: >3 drinks/day or >7 drinks/week? No   Prescreen: >3 drinks/day or >7 drinks/week? -   AUDIT SCORE  -       Reviewed orders with patient.  Reviewed health maintenance and  updated orders accordingly - Yes  Lab work is in process    Breast Cancer Screening:    FHS-7:   Breast CA Risk Assessment (FHS-7) 12/30/2021 4/1/2022   Did any of your first-degree relatives have breast or ovarian cancer? No No   Did any of your relatives have bilateral breast cancer? No No   Did any man in your family have breast cancer? No No   Did any woman in your family have breast and ovarian cancer? No No   Did any woman in your family have breast cancer before age 50 y? No No   Do you have 2 or more relatives with breast and/or ovarian cancer? - No   Do you have 2 or more relatives with breast and/or bowel cancer? No No     Mammogram Screening: Recommended annual mammography  Pertinent mammograms are reviewed under the imaging tab.    History of abnormal Pap smear: NO - age 30-65 PAP every 5 years with negative HPV co-testing recommended  PAP / HPV Latest Ref Rng & Units 3/13/2018 9/28/2012 3/10/2011   PAP (Historical) - NIL NIL NIL   HPV16 NEG:Negative Negative - -   HPV18 NEG:Negative Negative - -   HRHPV NEG:Negative Negative - -     Reviewed and updated as needed this visit by clinical staff   Tobacco  Allergies  Meds  Problems  Med Hx  Surg Hx  Fam Hx  Soc   Hx            Review of Systems   Constitutional: Negative for chills and fever.   HENT: Negative for congestion, ear pain, hearing loss and sore throat.    Eyes: Negative for pain and visual disturbance.   Respiratory: Negative for cough and shortness of breath.    Cardiovascular: Negative for chest pain, palpitations and peripheral edema.   Gastrointestinal: Negative for abdominal pain, constipation, diarrhea, heartburn, hematochezia and nausea.   Breasts:  Negative for tenderness, breast mass and discharge.   Genitourinary: Positive for pelvic pain. Negative for dysuria, frequency, genital sores, hematuria, urgency, vaginal bleeding and vaginal discharge.   Musculoskeletal: Positive for arthralgias. Negative for joint swelling and  "myalgias.   Skin: Negative for rash.   Neurological: Negative for dizziness, weakness, headaches and paresthesias.   Psychiatric/Behavioral: Positive for mood changes. The patient is not nervous/anxious.           OBJECTIVE:   BP 96/61   Pulse 111   Temp 98.6  F (37  C) (Oral)   Ht 1.663 m (5' 5.47\")   Wt 71.2 kg (157 lb)   LMP 11/30/2012   SpO2 99%   BMI 25.75 kg/m       Physical Exam  GENERAL APPEARANCE: healthy, alert and no distress  EYES: Eyes grossly normal to inspection, PERRL and conjunctivae and sclerae normal  HENT: ear canals and TM's normal, nose and mouth without ulcers or lesions, oropharynx clear and oral mucous membranes moist  NECK: no adenopathy, no asymmetry, masses, or scars and thyroid normal to palpation  RESP: lungs clear to auscultation - no rales, rhonchi or wheezes  CV: regular rate and rhythm, normal S1 S2, no S3 or S4, no murmur, click or rub, no peripheral edema and peripheral pulses strong  ABDOMEN: soft, bowel sounds normal and hepatomegaly noted, + venegas's   (female): normal female external genitalia, normal urethral meatus, vaginal mucosal atrophy noted, normal cervix, adnexae, and uterus without masses or abnormal discharge  MS: no musculoskeletal defects are noted and gait is age appropriate without ataxia  SKIN: no suspicious lesions or rashes  NEURO: Normal strength and tone, sensory exam grossly normal, mentation intact and speech normal  PSYCH: mentation appears normal and affect normal/bright    Diagnostic Test Results:  Labs reviewed in Epic    ASSESSMENT/PLAN:   Demetria was seen today for physical.    Diagnoses and all orders for this visit:    Routine general medical examination at a health care facility    Polymorphic light eruption  -     triamcinolone (KENALOG) 0.5 % external ointment; APPLY 1 GRAM TOPICALLY TO THE AFFECTED AREA TWICE DAILY    Bipolar 2 disorder (H)  -     lamoTRIgine (LAMICTAL) 25 MG tablet; Take 1 tablet (25 mg) by mouth daily  -     Lipid " "panel reflex to direct LDL Fasting; Future  -     CBC with platelets; Future  -     Lipid panel reflex to direct LDL Fasting  -     CBC with platelets    Hyperlipidemia LDL goal <100  -     simvastatin (ZOCOR) 40 MG tablet; Take 1 tablet (40 mg) by mouth At Bedtime  -     Lipid panel reflex to direct LDL Fasting; Future  -     Lipid panel reflex to direct LDL Fasting    Screen for colon cancer  -     Adult Gastro Ref - Procedure Only; Future    Screening for HIV (human immunodeficiency virus)    Need for hepatitis C screening test    Cervical cancer screening  -     Pap Screen with HPV - recommended age 30 - 65 years    Personal history of tobacco use  -     Prof fee: Shared Decisionmaking for Lung Cancer Screening    Right upper quadrant abdominal tenderness without rebound tenderness        Patient has been advised of split billing requirements and indicates understanding: Yes    COUNSELING:  Reviewed preventive health counseling, as reflected in patient instructions       Consider lung cancer screening for ages 55-80 years (77 for Medicare) and 20 pack-year smoking history     Estimated body mass index is 25.75 kg/m  as calculated from the following:    Height as of this encounter: 1.663 m (5' 5.47\").    Weight as of this encounter: 71.2 kg (157 lb).        She reports that she has been smoking cigarettes and other. She has a 9.90 pack-year smoking history. She quit smokeless tobacco use about 7 years ago.  Tobacco Cessation Action Plan:   Information offered: Patient not interested at this time      Counseling Resources:  ATP IV Guidelines  Pooled Cohorts Equation Calculator  Breast Cancer Risk Calculator  BRCA-Related Cancer Risk Assessment: FHS-7 Tool  FRAX Risk Assessment  ICSI Preventive Guidelines  Dietary Guidelines for Americans, 2010  [email or web address]'s MyPlate  ASA Prophylaxis  Lung CA Screening    Conner Sandoval PA-C  [facility]  Lung Cancer Screening Shared Decision Making Visit "     Demetria Goodrich is eligible for lung cancer screening on the basis of the information provided in my signed lung cancer screening order.     I have discussed with patient the risks and benefits of screening for lung cancer with low-dose CT.     The risks include:  radiation exposure: one low dose chest CT has as much ionizing radiation as about 15 chest x-rays or 6 months of background radiation living in [place]    false positives: 96% of positive findings/nodules are NOT cancer, but some might still require additional diagnostic evaluation, including biopsy  over-diagnosis: some slow growing cancers that might never have been clinically significant will be detected and treated unnecessarily     The benefit of early detection of lung cancer is contingent upon adherence to annual screening or more frequent follow up if indicated.     Furthermore, reaping the benefits of screening requires Demetria Goodrich to be willing and physically able to undergo diagnostic procedures, if indicated. Although no specific guide is available for determining severity of comorbidities, it is reasonable to withhold screening in patients who have greater mortality risk from other diseases.     We did discuss that the only way to prevent lung cancer is to not smoke. Smoking cessation counseling was given, duration < 3 minutes.      I did not offer risk estimation using a calculator such as this one:    ShouldIScreen

## 2022-04-01 NOTE — PATIENT INSTRUCTIONS
Preventive Health Recommendations  Female Ages 50 - 64    Yearly exam: See your health care provider every year in order to  o Review health changes.   o Discuss preventive care.    o Review your medicines if your doctor has prescribed any.      Get a Pap test every three years (unless you have an abnormal result and your provider advises testing more often).    If you get Pap tests with HPV test, you only need to test every 5 years, unless you have an abnormal result.     You do not need a Pap test if your uterus was removed (hysterectomy) and you have not had cancer.    You should be tested each year for STDs (sexually transmitted diseases) if you're at risk.     Have a mammogram every 1 to 2 years.    Have a colonoscopy at age 50, or have a yearly FIT test (stool test). These exams screen for colon cancer.      Have a cholesterol test every 5 years, or more often if advised.    Have a diabetes test (fasting glucose) every three years. If you are at risk for diabetes, you should have this test more often.     If you are at risk for osteoporosis (brittle bone disease), think about having a bone density scan (DEXA).    Shots: Get a flu shot each year. Get a tetanus shot every 10 years.    Nutrition:     Eat at least 5 servings of fruits and vegetables each day.    Eat whole-grain bread, whole-wheat pasta and brown rice instead of white grains and rice.    Get adequate Calcium and Vitamin D.     Lifestyle    Exercise at least 150 minutes a week (30 minutes a day, 5 days a week). This will help you control your weight and prevent disease.    Limit alcohol to one drink per day.    No smoking.     Wear sunscreen to prevent skin cancer.     See your dentist every six months for an exam and cleaning.    See your eye doctor every 1 to 2 years.    Preventive Health Recommendations  Female Ages 50 - 64    Yearly exam: See your health care provider every year in order to  o Review health changes.   o Discuss preventive  care.    o Review your medicines if your doctor has prescribed any.      Get a Pap test every three years (unless you have an abnormal result and your provider advises testing more often).    If you get Pap tests with HPV test, you only need to test every 5 years, unless you have an abnormal result.     You do not need a Pap test if your uterus was removed (hysterectomy) and you have not had cancer.    You should be tested each year for STDs (sexually transmitted diseases) if you're at risk.     Have a mammogram every 1 to 2 years.    Have a colonoscopy at age 50, or have a yearly FIT test (stool test). These exams screen for colon cancer.      Have a cholesterol test every 5 years, or more often if advised.    Have a diabetes test (fasting glucose) every three years. If you are at risk for diabetes, you should have this test more often.     If you are at risk for osteoporosis (brittle bone disease), think about having a bone density scan (DEXA).    Shots: Get a flu shot each year. Get a tetanus shot every 10 years.    Nutrition:     Eat at least 5 servings of fruits and vegetables each day.    Eat whole-grain bread, whole-wheat pasta and brown rice instead of white grains and rice.    Get adequate Calcium and Vitamin D.     Lifestyle    Exercise at least 150 minutes a week (30 minutes a day, 5 days a week). This will help you control your weight and prevent disease.    Limit alcohol to one drink per day.    No smoking.     Wear sunscreen to prevent skin cancer.     See your dentist every six months for an exam and cleaning.    See your eye doctor every 1 to 2 years.    Lung Cancer Screening   Frequently Asked Questions  If you are at high-risk for lung cancer, getting screened with low-dose computed tomography (LDCT) every year can help save your life. This handout offers answers to some of the most common questions about lung cancer screening. If you have other questions, please call [phone]  ([phone]).     What is it?  Lung cancer screening uses special X-ray technology to create an image of your lung tissue. The exam is quick and easy and takes less than 10 seconds. We don t give you any medicine or use any needles. You can eat before and after the exam. You don t need to change your clothes as long as the clothing on your chest doesn t contain metal. But, you do need to be able to hold your breath for at least 6 seconds during the exam.    What is the goal of lung cancer screening?  The goal of lung cancer screening is to save lives. Many times, lung cancer is not found until a person starts having physical symptoms. Lung cancer screening can help detect lung cancer in the earliest stages when it may be easier to treat.    Who should be screened for lung cancer?  We suggest lung cancer screening for anyone who is at high-risk for lung cancer. You are in the high-risk group if you:      are between the ages of 55 and 79, and    have smoked at least 1 pack of cigarettes a day for 20 or more years, and    still smoke or have quit within the past 15 years.    However, if you have a new cough or shortness of breath, you should talk to your doctor before being screened.    Why does it matter if I have symptoms?  Certain symptoms can be a sign that you have a condition in your lungs that should be checked and treated by your doctor. These symptoms include fever, chest pain, a new or changing cough, shortness of breath that you have never felt before, coughing up blood or unexplained weight loss. Having any of these symptoms can greatly affect the results of lung cancer screening.       Should all smokers get an LDCT lung cancer screening exam?  It depends. Lung cancer screening is for a very specific group of men and women who have a history of heavy smoking over a long period of time (see  Who should be screened for lung cancer  above).  I am in the high-risk group, but have been diagnosed with  cancer in the past. Is LDCT lung cancer screening right for me?  In some cases, you should not have LDCT lung screening, such as when your doctor is already following your cancer with CT scan studies. Your doctor will help you decide if LDCT lung screening is right for you.  Do I need to have a screening exam every year?  Yes. If you are in the high-risk group described earlier, you should get an LDCT lung cancer screening exam every year until you are 79, or are no longer willing or able to undergo screening and possible procedures to diagnose and treat lung cancer.  How effective is LDCT at preventing death from lung cancer?  Studies have shown that LDCT lung cancer screening can lower the risk of death from lung cancer by 20 percent in people who are at high-risk.  What are the risks?  There are some risks and limitations of LDCT lung cancer screening. We want to make sure you understand the risks and benefits, so please let us know if you have any questions. Your doctor may want to talk with you more about these risks.    Radiation exposure: As with any exam that uses radiation, there is a very small increased risk of cancer. The amount of radiation in LDCT is small--about the same amount a person would get from a mammogram. Your doctor orders the exam when he or she feels the potential benefits outweigh the risks.    False negatives: No test is perfect, including LDCT. It is possible that you may have a medical condition, including lung cancer, that is not found during your exam. This is called a false negative result.    False positives and more testing: LDCT very often finds something in the lung that could be cancer, but in fact is not. This is called a false positive result. False positive tests often cause anxiety. To make sure these findings are not cancer, you may need to have more tests. These tests will be done only if you give us permission. Sometimes patients need a treatment that can have side  effects, such as a biopsy. For more information on false positives, see  What can I expect from the results?     Findings not related to lung cancer: Your LDCT exam also takes pictures of areas of your body next to your lungs. In a very small number of cases, the CT scan will show an abnormal finding in one of these areas, such as your kidneys, adrenal glands, liver or thyroid. This finding may not be serious, but you may need more tests. Your doctor can help you decide what other tests you may need, if any.  What can I expect from the results?  About 1 out of 4 LDCT exams will find something that may need more tests. Most of the time, these findings are lung nodules. Lung nodules are very small collections of tissue in the lung. These nodules are very common, and the vast majority--more than 97 percent--are not cancer (benign). Most are normal lymph nodes or small areas of scarring from past infections.  But, if a small lung nodule is found to be cancer, the cancer can be cured more than 90 percent of the time. To know if the nodule is cancer, we may need to get more images before your next yearly screening exam. If the nodule has suspicious features (for example, it is large, has an odd shape or grows over time), we will refer you to a specialist for further testing.  Will my doctor also get the results?  Yes. Your doctor will get a copy of your results.  Is it okay to keep smoking now that there s a cancer screening exam?  No. Tobacco is one of the strongest cancer-causing agents. It causes not only lung cancer, but other cancers and cardiovascular (heart) diseases as well. The damage caused by smoking builds over time. This means that the longer you smoke, the higher your risk of disease. While it is never too late to quit, the sooner you quit, the better.  Where can I find help to quit smoking?  The best way to prevent lung cancer is to stop smoking. If you have already quit smoking, congratulations and keep it  up! For help on quitting smoking, please call [facility] at 4-944-QUIT-NOW (1-350.957.8456) or the [employer] at 1-146.845.9439 to find local resources near you.  One-on-one health coaching:  If you d prefer to work individually with a health care provider on tobacco cessation, we offer:      Medication Therapy Management:  Our specially trained pharmacists work closely with you and your doctor to help you quit smoking.  Call [phone] or [phone] (toll free).

## 2022-04-08 PROBLEM — R87.810 CERVICAL HIGH RISK HPV (HUMAN PAPILLOMAVIRUS) TEST POSITIVE: Status: ACTIVE | Noted: 2022-01-01

## 2022-04-13 NOTE — PROGRESS NOTES
"  Assessment & Plan     Chronic midline low back pain without sciatica  - XR Lumbar Spine 2/3 Views; Future  - Physical Therapy Referral; Future  - UA macro with reflex to Microscopic and Culture - Clinc Collect  - naproxen sodium (ANAPROX) 220 MG tablet; Take 1 tablet (220 mg) by mouth 2 times daily (with meals)  - Adult Gastro Ref - Consult Only; Future    Alcohol abuse  - Adult Mental Health  Referral; Future  - Adult Gastro Ref - Consult Only; Future        Return in about 2 weeks (around 4/27/2022) for For specialty consultation.    Conner Sandoval PA-C  [facility] ALEXX Augustin is a 56 year old who presents for the following health issues  accompanied by her self.    History of Present Illness       Reason for visit:  Pain (no longer) under right rib cage, then moves to upper butt, then check bones. Not muscle, bone.  Symptom onset:  More than a month  Symptoms include:  See above  Symptom intensity:  Moderate  Symptom progression:  Staying the same  Had these symptoms before:  No  What makes it worse:  No  What makes it better:  Tylenol    She eats 2-3 servings of fruits and vegetables daily.She consumes 0 sweetened beverage(s) daily.She exercises with enough effort to increase her heart rate 20 to 29 minutes per day.  She exercises with enough effort to increase her heart rate 3 or less days per week.   She is taking medications regularly.       Patient notes that the pain is intermittent and has resolved with Aleve.  Lab review performed.  Patient amenable to hepatology and Physical Therapy referrals at this time with follow up.     Review of Systems   Constitutional, HEENT, cardiovascular, pulmonary, gi and gu systems are negative, except as otherwise noted.      Objective    /80   Pulse (!) 132   Temp 97.7  F (36.5  C) (Oral)   Ht 1.663 m (5' 5.47\")   Wt 70.9 kg (156 lb 3.2 oz)   LMP 11/30/2012   SpO2 100%   BMI 25.62 kg/m    Body mass index is " 25.62 kg/m .  Physical Exam   GENERAL: healthy, alert and no distress  SKIN: no suspicious lesions or rashes  Comprehensive back pain exam:  No tenderness, Range of motion not limited by pain, Lower extremity strength functional and equal on both sides, Lower extremity reflexes within normal limits bilaterally and Straight leg raise negative bilaterally    Results for orders placed or performed in visit on 04/13/22 (from the past 24 hour(s))   UA macro with reflex to Microscopic and Culture - Clinc Collect    Specimen: Urine, Midstream   Result Value Ref Range    Color Urine Yellow Colorless, Straw, Light Yellow, Yellow    Appearance Urine Clear Clear    Glucose Urine Negative Negative mg/dL    Bilirubin Urine Negative Negative    Ketones Urine Trace (A) Negative mg/dL    Specific Gravity Urine >=1.030 1.003 - 1.035    Blood Urine Negative Negative    pH Urine 5.0 5.0 - 7.0    Protein Albumin Urine Negative Negative mg/dL    Urobilinogen Urine 0.2 0.2, 1.0 E.U./dL    Nitrite Urine Negative Negative    Leukocyte Esterase Urine Negative Negative    Narrative    Microscopic not indicated

## 2022-04-22 NOTE — PROGRESS NOTES
Review of Oncology referral from GI for pt with rectal malignancy.    4/22/2022 screening colonoscopy completed by Dr Fischer at [facility], path pending, but report notes:    Impression:       - Likely malignant completely obstructing tumor in                             the proximal rectum extending from 12cm proximal to                             the anal verge to at least 16cm proximal to the                             anal verge at which point the lumen is sufficiently                             narrowed so that neither pediatric colonoscope, nor                             EGD scope could pass through. Biopsied.                             - One 5 mm polyp at 5 cm proximal to the anus,                             removed with a cold snare. Resected and retrieved.                             - The distal rectum and anal verge are normal on                             retroflexion view.     Dr Fischer ordering CT CAP, referrals to Med Onc & CRS. Will call to offer first available GI Med Onc (Dr Borrero 5/2/22) or schedule per pt preference.

## 2022-04-22 NOTE — H&P
ENDOSCOPY PRE-SEDATION H&P FOR OUTPATIENT PROCEDURES    Demetria Goodrich  [id number]  1965    Procedure: colonoscopy    Pre-procedure diagnosis: screening    Past medical history:   Past Medical History:   Diagnosis Date     ASCUS on Pap smear 2010     Cervical high risk HPV (human papillomavirus) test positive 4/1/2022 2005, 2010 ASCUS, neg HR HPV 2008, 2011, 2012 NIL paps 3/13/18 NIL pap, neg HPV 4/1/22 NIL pap, +HR HPV (not 16/18). Plan: cotest in 1 year     Chronic hypomanic disorder (H)      Other and unspecified alcohol dependence, unspecified drinking behavior      Varicose veins of lower extremities with other complications     both legs       Past surgical history:   Past Surgical History:   Procedure Laterality Date     LIGATN/STRIP LONG & SHORT SAPHEN  1990+1995    both legs       Current Outpatient Medications   Medication     ARIPiprazole (ABILIFY) 5 MG tablet     naproxen sodium (ANAPROX) 220 MG tablet     simvastatin (ZOCOR) 40 MG tablet     triamcinolone (KENALOG) 0.5 % external ointment     lamoTRIgine (LAMICTAL) 25 MG tablet     Current Facility-Administered Medications   Medication     fentaNYL (PF) (SUBLIMAZE) injection     lidocaine (LMX4) kit     lidocaine 1 % 0.1-1 mL     midazolam (VERSED) injection     ondansetron (ZOFRAN) injection 4 mg     sodium chloride (PF) 0.9% PF flush 3 mL     sodium chloride (PF) 0.9% PF flush 3 mL       Allergies   Allergen Reactions     No Known Drug Allergies        History of Anesthesia/Sedation Problems: no    Physical Exam:    Mental status: alert  Heart: Normal  Lung: Normal  Assessment of patient's airway: Normal  Other as pertinent for procedure: None     ASA Score: See Provation note    Mallampati score:  II - Faucial pillars and soft palate may be seen, but uvula is masked by the base of the tongue    Assessment/Plan:     The patient is an appropriate candidate to receive sedation.    Informed consent was discussed with the patient/family,  including the risks, benefits, potential complications and any alternative options associated with sedation.    Patient assessment completed just prior to sedation and while under constant observation by the provider. Condition determined to be adequate for proceeding with sedation.    The specific risks for the procedure were discussed with the patient at the time of informed consent and include but are not limited to perforation which could require surgery, missing significant neoplasm or lesion, hemorrhage and adverse sedative complication.      Everette Fischer MD

## 2022-04-22 NOTE — TELEPHONE ENCOUNTER
[facility] Call Center    Phone Message    May a detailed message be left on voicemail: yes     Reason for Call: Appointment Intake      Referring Provider Name: Everette Fischer MD    Diagnosis and/or Symptoms: Colon Cancer. rectal mass on screening colonoscopy    Action Taken: Message routed to:  [facility] Colorectal     Travel Screening: Not Applicable

## 2022-04-25 NOTE — TELEPHONE ENCOUNTER
New Cancer    Referring/Requesting provider and Health care System: Dr. Fischer       Indication/Diagnosis for consultation: new rectal cancer    Imaging completed: no     CT scan: Yes 5/2/2022 at 3:20pm                   MRI: Yes 5/2/2022 at 9am  Blood work: 5/2/2022 at 10:15am  Visit: 5/5/2022 at  12pm             Are images able/being pushed to our system? Yes    Colonoscopy Report: Yes         Pathology Report: Yes       Is patient aware of request for clinic consultation and ok to be contacted to schedule? Yes    FAX ALL RECORDS ATTENTION TO COLON AND RECTAL SURGERY -[phone]

## 2022-04-26 NOTE — PROGRESS NOTES
RECORDS STATUS - ALL OTHER DIAGNOSIS      Action    Action Taken 4/26/2022 1:52PM KEB     I called pt Zoya- unavailable.      RECORDS RECEIVED FROM: [facility]   DATE RECEIVED: 5/6/2022   NOTES STATUS DETAILS   OFFICE NOTE from referring provider Complete [facility]   Referral Everette Fischer MD   DISCHARGE SUMMARY from hospital     DISCHARGE REPORT from the ER     OPERATIVE REPORT Complete Colonoscopy 4/22/2022   MEDICATION LIST Complete [facility]   CLINICAL TRIAL TREATMENTS TO DATE     LABS     PATHOLOGY REPORTS Pending  Biopsy in Process    ANYTHING RELATED TO DIAGNOSIS Complete Labs last updated on 4/22/2022    GENONOMIC TESTING     TYPE:     IMAGING (NEED IMAGES & REPORT)     CT SCANS     MRI     MAMMO     Xray Lumbar Spine Complete 4/13/2022   ULTRASOUND     PET

## 2022-04-27 NOTE — TELEPHONE ENCOUNTER
Diagnosis, Referred by & from: new rectal cancer    Appt date: 5/5/22   NOTES STATUS DETAILS   OFFICE NOTE from referring provider Internal 4/22/22 referral Everette Fischer MD   MEDICATION LIST Internal    LABS     BIOPSIES/PATHOLOGY RELATED TO DIAGNOSIS Internal 4/22/22 (Case: [id number] )   DIAGNOSTIC PROCEDURES     COLONOSCOPY Internal 4/22/22   IMAGING (DISC & REPORT)      CT Internal 5/2/22 scheduled CT Chest Abdomen    MRI Internal 5/2/22 scheduled MR rectum

## 2022-04-28 NOTE — TELEPHONE ENCOUNTER
RECORDS RECEIVED FROM: Internal   Appt Date: 05.11.2022   NOTES STATUS DETAILS   OFFICE NOTE from referring provider Internal 04.13.2022 Conner Sandoval PA-C   OFFICE NOTES from other specialists     DISCHARGE SUMMARY from hospital     MEDICATION LIST Internal    LIVER BIOSPY (IF APPLICABLE)      PATHOLOGY REPORTS      IMAGING     ENDOSCOPY (IF AVAILABLE)     COLONOSCOPY (IF AVAILABLE) Internal 04.22.2022   ULTRASOUND LIVER     CT OF ABDOMEN     MRI OF LIVER     FIBROSCAN, US ELASTOGRAPHY, FIBROSIS SCAN, MR ELASTOGRAPHY     LABS     HEPATIC PANEL (LIVER PANEL) Internal 04.01.2022   BASIC METABOLIC PANEL Internal 07.23.2020   COMPLETE METABOLIC PANEL Internal 04.01.2022   COMPLETE BLOOD COUNT (CBC) Internal 04.01.2022   INTERNATIONAL NORMALIZED RATIO (INR)     HEPATITIS C ANTIBODY     HEPATITIS C VIRAL LOAD/PCR     HEPATITIS C GENOTYPE     HEPATITIS B SURFACE ANTIGEN     HEPATITIS B SURFACE ANTIBODY     HEPATITIS B DNA QUANT LEVEL     HEPATITIS B CORE ANTIBODY

## 2022-04-29 NOTE — TELEPHONE ENCOUNTER
Patient says she is having urinary frequency and now is only able to void a small amount with pressure within the last hour.  Patient denies any fever, and does not want to be triaged.  Patient says she will go into Urgent Care, but can't go today.  Patient is requesting advice on managing symptoms   Triager encouraged patient is take in adequate fluids, drink some cranberry juice, and over the counter pain medication of tylenol, ibuprofen or aleve.   Caller verbalized and understands directives  COVID 19 Nurse Triage Plan/Patient Instructions    Please be aware that novel coronavirus (COVID-19) may be circulating in the community. If you develop symptoms such as fever, cough, or SOB or if you have concerns about the presence of another infection including coronavirus (COVID-19), please contact your health care provider or visit [email or web address].     Disposition/Instructions    In-Person Visit with provider recommended. Reference Visit Selection Guide.    Thank you for taking steps to prevent the spread of this virus.  o Limit your contact with others.  o Wear a simple mask to cover your cough.  o Wash your hands well and often.    Resources    [facility]: About COVID-19: [email or web address]    CDC: What to Do If You're Sick: www.cdc.gov/coronavirus/2019-ncov/about/steps-when-sick.html    CDC: Ending Home Isolation: www.cdc.gov/coronavirus/2019-ncov/hcp/disposition-in-home-patients.html     CDC: Caring for Someone: www.cdc.gov/coronavirus/2019-ncov/if-you-are-sick/care-for-someone.html     [facility]: Interim Guidance for Hospital Discharge to Home: www.health.state.[place]/diseases/coronavirus/hcp/hospdischarge.pdf    [facility] clinical trials (COVID-19 research studies): [email or web address]-trials     Below are the COVID-19 hotlines at the [place] Department [facility] ([facility]). Interpreters are available.   o For health questions: Call 883-230-9016 or  1-399.780.2467 (7 a.m. to 7 p.m.)  o For questions about schools and childcare: Call 746-550-3943 or 1-644.446.4290 (7 a.m. to 7 p.m.)

## 2022-05-04 NOTE — TELEPHONE ENCOUNTER
Spoke with Zoya. She is feeling much better.  Will follow up with Dr. Carrasquillo in the morning at an appt she scheduled regarding leg swelling.  Did review that it would be a good idea to check a UA as the nurse had recommended she drink cranberry juice.  Patient notes that she doesn't have UTI sx currently but UA check was encouraged.   Lara GARCIA

## 2022-05-04 NOTE — PATIENT INSTRUCTIONS
Lung Cancer Screening   Frequently Asked Questions  If you are at high-risk for lung cancer, getting screened with low-dose computed tomography (LDCT) every year can help save your life. This handout offers answers to some of the most common questions about lung cancer screening. If you have other questions, please call [phone] ([phone]).     What is it?  Lung cancer screening uses special X-ray technology to create an image of your lung tissue. The exam is quick and easy and takes less than 10 seconds. We don t give you any medicine or use any needles. You can eat before and after the exam. You don t need to change your clothes as long as the clothing on your chest doesn t contain metal. But, you do need to be able to hold your breath for at least 6 seconds during the exam.    What is the goal of lung cancer screening?  The goal of lung cancer screening is to save lives. Many times, lung cancer is not found until a person starts having physical symptoms. Lung cancer screening can help detect lung cancer in the earliest stages when it may be easier to treat.    Who should be screened for lung cancer?  We suggest lung cancer screening for anyone who is at high-risk for lung cancer. You are in the high-risk group if you:      are between the ages of 55 and 79, and    have smoked at least 1 pack of cigarettes a day for 20 or more years, and    still smoke or have quit within the past 15 years.    However, if you have a new cough or shortness of breath, you should talk to your doctor before being screened.    Why does it matter if I have symptoms?  Certain symptoms can be a sign that you have a condition in your lungs that should be checked and treated by your doctor. These symptoms include fever, chest pain, a new or changing cough, shortness of breath that you have never felt before, coughing up blood or unexplained weight loss. Having any of these symptoms can greatly affect the results of lung  cancer screening.       Should all smokers get an LDCT lung cancer screening exam?  It depends. Lung cancer screening is for a very specific group of men and women who have a history of heavy smoking over a long period of time (see  Who should be screened for lung cancer  above).  I am in the high-risk group, but have been diagnosed with cancer in the past. Is LDCT lung cancer screening right for me?  In some cases, you should not have LDCT lung screening, such as when your doctor is already following your cancer with CT scan studies. Your doctor will help you decide if LDCT lung screening is right for you.  Do I need to have a screening exam every year?  Yes. If you are in the high-risk group described earlier, you should get an LDCT lung cancer screening exam every year until you are 79, or are no longer willing or able to undergo screening and possible procedures to diagnose and treat lung cancer.  How effective is LDCT at preventing death from lung cancer?  Studies have shown that LDCT lung cancer screening can lower the risk of death from lung cancer by 20 percent in people who are at high-risk.  What are the risks?  There are some risks and limitations of LDCT lung cancer screening. We want to make sure you understand the risks and benefits, so please let us know if you have any questions. Your doctor may want to talk with you more about these risks.    Radiation exposure: As with any exam that uses radiation, there is a very small increased risk of cancer. The amount of radiation in LDCT is small--about the same amount a person would get from a mammogram. Your doctor orders the exam when he or she feels the potential benefits outweigh the risks.    False negatives: No test is perfect, including LDCT. It is possible that you may have a medical condition, including lung cancer, that is not found during your exam. This is called a false negative result.    False positives and more testing: LDCT very often finds  something in the lung that could be cancer, but in fact is not. This is called a false positive result. False positive tests often cause anxiety. To make sure these findings are not cancer, you may need to have more tests. These tests will be done only if you give us permission. Sometimes patients need a treatment that can have side effects, such as a biopsy. For more information on false positives, see  What can I expect from the results?     Findings not related to lung cancer: Your LDCT exam also takes pictures of areas of your body next to your lungs. In a very small number of cases, the CT scan will show an abnormal finding in one of these areas, such as your kidneys, adrenal glands, liver or thyroid. This finding may not be serious, but you may need more tests. Your doctor can help you decide what other tests you may need, if any.  What can I expect from the results?  About 1 out of 4 LDCT exams will find something that may need more tests. Most of the time, these findings are lung nodules. Lung nodules are very small collections of tissue in the lung. These nodules are very common, and the vast majority--more than 97 percent--are not cancer (benign). Most are normal lymph nodes or small areas of scarring from past infections.  But, if a small lung nodule is found to be cancer, the cancer can be cured more than 90 percent of the time. To know if the nodule is cancer, we may need to get more images before your next yearly screening exam. If the nodule has suspicious features (for example, it is large, has an odd shape or grows over time), we will refer you to a specialist for further testing.  Will my doctor also get the results?  Yes. Your doctor will get a copy of your results.  Is it okay to keep smoking now that there s a cancer screening exam?  No. Tobacco is one of the strongest cancer-causing agents. It causes not only lung cancer, but other cancers and cardiovascular (heart) diseases as well. The damage  caused by smoking builds over time. This means that the longer you smoke, the higher your risk of disease. While it is never too late to quit, the sooner you quit, the better.  Where can I find help to quit smoking?  The best way to prevent lung cancer is to stop smoking. If you have already quit smoking, congratulations and keep it up! For help on quitting smoking, please call [employer] at 8-667-QUITNOW (1-172.455.6790) or the American [employer] at 1-277.555.7916 to find local resources near you.  One-on-one health coaching:  If you d prefer to work individually with a health care provider on tobacco cessation, we offer:      Medication Therapy Management:  Our specially trained pharmacists work closely with you and your doctor to help you quit smoking.  Call [phone] or [phone] (toll free).

## 2022-05-04 NOTE — TELEPHONE ENCOUNTER
Lung Cancer Screening Shared Decision Making Visit     Demetria Goodrich, a 57 year old female, is eligible for lung cancer screening    History   Smoking Status     Current Every Day Smoker     Packs/day: 0.07     Years: 33.00     Types: Other, Cigarettes   Smokeless Tobacco     Former User     Quit date: 3/13/2015     Comment: have Chantex prescription       I have discussed with patient the risks and benefits of screening for lung cancer with low-dose CT.     The risks include:    radiation exposure: one low dose chest CT has as much ionizing radiation as about 15 chest x-rays, or 6 months of background radiation living in [place]      false positives: most findings/nodules are NOT cancer, but some might still require additional diagnostic evaluation, including biopsy    over-diagnosis: some slow growing cancers that might never have been clinically significant will be detected and treated unnecessarily     The benefit of early detection of lung cancer is contingent upon adherence to annual screening or more frequent follow up if indicated.     Furthermore, to benefit from screening, Demetria must be willing and able to undergo diagnostic procedures, if indicated. Although no specific guide is available for determining severity of comorbidities, it is reasonable to withhold screening in patients who have greater mortality risk from other diseases.     We did discuss that the best way to prevent lung cancer is to not smoke.    Some patients may value a numeric estimation of lung cancer risk when evaluating if lung cancer screening is right for them, here is one calculator:    ShouldIScreen

## 2022-05-04 NOTE — TELEPHONE ENCOUNTER
----- Message from Carol Gold RN sent at 5/2/2022 10:30 AM CDT -----  Regarding: Please look at this patient's chart asap  Good morning-    I am one of the radiology nurses [place] [facility] wanted to bring to your attention that Zoya vomited while in the MRI today.  She did not call for help, but was covered in vomit when I came in to give her glucagon for the MRI.  Emesis was dark maroon with flecks and quite odorous.   Patient stated she had been drinking cranberry juice earlier but I felt it could have been blood.  She is pale, weak and nauseated.   VS were OK, /74, , sats 99% on RA.      I know she doesn't have an appointment until Thursday with Dr. Nelson, but I'm wondering if someone could check on her imaging and labs from today and phone her in the interim?    With appreciation -     Yvette Gold

## 2022-05-05 PROBLEM — C20 METASTASIS FROM RECTAL CANCER (H): Status: ACTIVE | Noted: 2022-01-01

## 2022-05-05 PROBLEM — K92.2 GASTROINTESTINAL HEMORRHAGE, UNSPECIFIED GASTROINTESTINAL HEMORRHAGE TYPE: Status: ACTIVE | Noted: 2022-01-01

## 2022-05-05 PROBLEM — C20 RECTAL CANCER (H): Status: ACTIVE | Noted: 2022-01-01

## 2022-05-05 PROBLEM — C79.9 METASTASIS FROM RECTAL CANCER (H): Status: ACTIVE | Noted: 2022-01-01

## 2022-05-05 NOTE — ED TRIAGE NOTES
Pt was told to come to the ER because her labs today showed that her hgb was 4.9.     She as rectal and liver cancer.     Denies pain, SOB.     /66   Pulse 83   Temp 98  F (36.7  C) (Oral)   Resp 16   Wt 70 kg (154 lb 5.2 oz)   LMP 11/30/2012   SpO2 100%   BMI 25.31 kg/m       Triage Assessment     Row Name 05/05/22 1147       Triage Assessment (Adult)    Airway WDL WDL       Respiratory WDL    Respiratory WDL WDL       Skin Circulation/Temperature WDL    Skin Circulation/Temperature WDL WDL       Cardiac WDL    Cardiac WDL WDL       Peripheral/Neurovascular WDL    Peripheral Neurovascular WDL WDL       Cognitive/Neuro/Behavioral WDL    Cognitive/Neuro/Behavioral WDL WDL

## 2022-05-05 NOTE — PROGRESS NOTES
Assessment & Plan     ICD-10-CM    1. Rectal cancer (H)  C20 CBC with platelets     Comprehensive metabolic panel (BMP + Alb, Alk Phos, ALT, AST, Total. Bili, TP)     CBC with platelets     Comprehensive metabolic panel (BMP + Alb, Alk Phos, ALT, AST, Total. Bili, TP)   2. Urinary frequency  R35.0 UA Macro with Reflex to Micro and Culture - lab collect     UA Macro with Reflex to Micro and Culture - lab collect     Urine Microscopic   3. Swelling of both lower extremities  M79.89    4. Anemia, unspecified type  D64.9    5. Encounter for tobacco use cessation counseling  Z71.6      Bilateral leg edema due to anemia  I called [facility] Emergency Room Physician   Pt will go to ER now  She declines ambulance  Her family member will drive her  No follow-ups on file.    Teresa Carrasquillo MD  [facility] is a 57 year old who presents for the following health issues  With recent diagnosis of Rectal Cancer, anemia, tobacco abuse comes in with Bilateral Lower leg swelling  She denies any sob or chest pain      History of Present Illness       Reason for visit:  Very swollen feet and ankles for 8 days. Bladder infection test  Symptom onset:  1-2 weeks ago  Symptoms include:  See above  Symptom intensity:  Severe  Symptom progression:  Staying the same  Had these symptoms before:  No  What makes it worse:  No  What makes it better:  No    She eats 2-3 servings of fruits and vegetables daily.She consumes 0 sweetened beverage(s) daily.She exercises with enough effort to increase her heart rate 10 to 19 minutes per day.  She exercises with enough effort to increase her heart rate 3 or less days per week.   She is taking medications regularly.     Review of Systems   CONSTITUTIONAL: NEGATIVE for fever, chills, change in weight  RESP: NEGATIVE for significant cough or SOB-pt does not walk too much  CV: NEGATIVE for chest pain, palpitations or peripheral edema  GI: NEGATIVE for nausea,  abdominal pain, heartburn, or change in bowel habits  PSYCHIATRIC: bipolar disorder  Rest of the ROS is Negative except see above and Problem list [stable]        Objective    /56   Pulse 101   Temp 98  F (36.7  C) (Temporal)   Wt 70 kg (154 lb 6.4 oz)   LMP 11/30/2012   SpO2 100%   BMI 25.32 kg/m    Body mass index is 25.32 kg/m .  Physical Exam   GENERAL: healthy, alert and no distress  NECK: no adenopathy, no asymmetry, masses, or scars and thyroid normal to palpation  RESP: lungs clear to auscultation - no rales, rhonchi or wheezes  CV: regular rate and rhythm, normal S1 S2, no S3 or S4, no murmur, click or rub, no peripheral edema and peripheral pulses strong  ABDOMEN: soft, nontender, no hepatosplenomegaly, no masses and bowel sounds normal  MS: no gross musculoskeletal defects noted, no edema  NEURO: Normal strength and tone, mentation intact and speech normal  PSYCH: mentation appears normal, affect normal/bright    Lab on 05/02/2022   Component Date Value Ref Range Status     CEA 05/02/2022 3,837.4 (A) 0.0 - 2.5 ug/L Final    Smoking may cause CEA results to be elevated.     Results for orders placed or performed in visit on 05/05/22   UA Macro with Reflex to Micro and Culture - lab collect     Status: Abnormal    Specimen: Urine, Midstream   Result Value Ref Range    Color Urine Yellow Colorless, Straw, Light Yellow, Yellow    Appearance Urine Clear Clear    Glucose Urine Negative Negative mg/dL    Bilirubin Urine Negative Negative    Ketones Urine Negative Negative mg/dL    Specific Gravity Urine 1.015 1.003 - 1.035    Blood Urine Large (A) Negative    pH Urine 5.0 5.0 - 7.0    Protein Albumin Urine Negative Negative mg/dL    Urobilinogen Urine 0.2 0.2, 1.0 E.U./dL    Nitrite Urine Negative Negative    Leukocyte Esterase Urine Trace (A) Negative   Urine Microscopic     Status: Abnormal   Result Value Ref Range    Bacteria Urine Few (A) None Seen /HPF    RBC Urine 10-25 (A) 0-2 /HPF /HPF    WBC  Urine 5-10 (A) 0-5 /HPF /HPF    Squamous Epithelials Urine Few (A) None Seen /LPF    Narrative    Urine Culture not indicated

## 2022-05-05 NOTE — PROVIDER NOTIFICATION
Provider notified (name): gold cross cover [id number] ([facility])  Reason for notification:HIGH: ED [address] Renuka RN [id number] please call RN re: critical result  Recommendation/request given to provider:  Response from provider: MD called writer back. Hgb=5.4 critical low. Writer expressed concern re pt BPs now 80s/60s. Pt denies dizziness, nausea, pain. First unit of blood finished infusing. MD would like remaining 2 units of PRBCs prioritized and infused stat. Procalcitonin can be drawn later-only needed for workup. Writer will start IV zosyn after BC X2 drawn. Writer expressed concern that pt needs a 3L PICC stat and ICU care. MD agrees pt needs ICU bed and also pressors.

## 2022-05-05 NOTE — CODE/RAPID RESPONSE
Rapid Response Team Note    Assessment   In assessment a rapid response was called on Demetria Goodrich due to lactic acidosis. This presentation is likely multifactorial due to hypotension from GIB and intravascular depletion with concern of developing shock, malignancy, possible sepsis with UTI, and starvation/alcohol ketoacidosis.     Plan   -  1L LR bolus  -  Continue with transfusion, already received 1 Unit of pRBC, will transfuse another 2 Units   -  CTA abd/pelvis  -  Continue PPI IV and octreotide drip   -  Blood cultures pending. Broaden abx from Ceftriaxone to Vancomycin/Zosyn   -  Patient with poor urine output, repeat BMP, and TLS labs  -  Check fibrinogen, PTT. INR 1.5  -  Repeat lactic acid in 2 hours   -  PICC getting placed   -  The Internal Medicine primary team was able to be reached and they are in agreement with the above plan.  -  Disposition: The patient will be transferred to IMC., but if does not responding to resuscitation will transfer to ICU. Spoke with ICU staff.   -  Reassessment and plan follow-up will be performed by the primary team    Genna Luevano PA-C  [facility] RRT [facility] Job Code Contact #[phone]  [facility] Paging/Directory    Hospital Course   Brief Summary of events leading to rapid response:   RRT was called for lactic acid of 8.0 on recheck. Initially on evaluation patient was normotensive with SBP 100s, with borderline tachycardia. Afebrile.      Demteria Goodrich is a 56 yo F with PMHx of HLD, alcohol use disorder, bipolar disorder, and recently diagnosed rectal adenocarcinoma with liver metastasis, admitted from clinic on 5/5/2022 for severe anemia, suspected blood loss anemia.  Patient is a poor historian, minimizing symptoms. Denies any F/C, CP, SOB, N/V/D, abdominal pain, bloody stools. She reports having dark stools over the last several months. States she vomiting up cranberry juice earlier, and is adamant that it was not blood. Endorses dysuria consistent with prior  UTI, and has had decrease urine output today. Bladder scan at bedside <20 mL.  She reports significant LE edema that started several weeks ago, but denies any calf pain. US ruled out DVT.     Admission Diagnosis:   Metastasis from rectal cancer (H) [C79.9, C20]    Physical Exam   Temp: 97.9  F (36.6  C) Temp  Min: 97.7  F (36.5  C)  Max: 98.2  F (36.8  C)  Resp: 22 Resp  Min: 11  Max: 25  SpO2: 100 % SpO2  Min: 98 %  Max: 100 %  Pulse: 104 Pulse  Min: 83  Max: 108    No data recorded  BP: 100/66 Systolic (24hrs), Av , Min:75 , Max:117   Diastolic (24hrs), Av, Min:51, Max:73     I/Os: I/O last 3 completed shifts:  In: 350   Out: 100 [Urine:100]     Exam:   General: Chronically ill appearing, in NAD  Mental Status: AAOx4.  CV: Tachycardic rate, regular rhythm   Resp: Non-labored breathing on RA. Lungs clear to auscultation bilaterally.   Abd: Non-distended. Hard mass palpated in upper abdomen, TTP in LUQ, but no peritoneal signs or rebound tenderness. Normoactive BS.   Extremities: +2 pitting edema in LE bilaterally. WWP.     Significant Results and Procedures   Lactic Acid:   Recent Labs   Lab Test 22  1655 22  1219   LACT 8.2* 10.1*     CBC:   Recent Labs   Lab Test 22  1155 22  1001 22  1123   WBC 27.3* 28.1* 21.9*   HGB 5.8*  5.8* 4.8* 8.4*   HCT 20.0* 17.1* 26.0*    318 543*        Sepsis Evaluation   The patient is known to have an infection.  Demetria Goodrich meets SIRS criteria AND has a lactate >2 or other evidence of acute organ damage.  These vital signs, lab and physical exam findings constitute a diagnosis of SEVERE SEPSIS.    Sepsis Time-Zero (time severe sepsis diagnosis confirmed):  22 as this was the time when Lactate resulted, and the level was > 2.0     Anti-infectives (From now, onward)    Start     Dose/Rate Route Frequency Ordered Stop    22 193  vancomycin 1500 mg in 0.9% NaCl 250 ml intermittent infusion 1,500 mg         1,500 mg  over  "90 Minutes Intravenous EVERY 24 HOURS 05/05/22 1830      05/05/22 1900  vancomycin (VANCOCIN) 1,750 mg in sodium chloride 0.9 % 500 mL intermittent infusion         1,750 mg  over 120 Minutes Intravenous ONCE 05/05/22 1825      05/05/22 1800  piperacillin-tazobactam (ZOSYN) 4.5 g vial to attach to  mL bag        Note to Pharmacy: For [facility], [facility] and [facility]: For Zosyn-naive patients, use the \"Zosyn initial dose + extended infusion\" order panel.    4.5 g  over 30 Minutes Intravenous EVERY 6 HOURS 05/05/22 1757          Current antibiotic coverage is appropriate for source of infection.    3 Hour Severe Sepsis Bundle Completion:  1. Initial Lactic Acid result shown above. Repeat lactic acid ordered for 2 hours from now.   2. Blood Cultures before Antibiotics: Yes  3. Broad Spectrum Antibiotics Administered: yes  4. Fluids: 1000  mL fluids ORDERED to be given     "

## 2022-05-05 NOTE — H&P
[facility]    History and Physical - Hospitalist Service, GOLD TEAM 8       Date of Admission:  5/5/2022    Assessment & Plan   Mrs. Demetria Goodrich is a 56 yo female with hx of HLD, etoh use d/o, bipolar d/o, and recently diagnosed rectal adenocarcinoma, admitted to [facility] after being told by her PCP to come to ED due to severe anemia.      # Acute blood loss anemia  # Recurrent melena and recent hematemesis  # Recent diagnosis of metastatic rectal adenocarcinoma  # Severe lactic acidosis  Over the past several months, 30 lb wt loss, lack of appetite, weakness and SHARMA. Also with recurrent melena. Underwent colonoscopy this past April that revealed large rectal mass with pathology + for rectal adenocarcinoma. Underwent staging MRI 5/2 that revealed cancer T3d. During MRI, pt vomited what appeared to be blood, but pt feels it was the cranberry juice she was taking for UTI sxs. Denies recurrence of vomiting. PCP made aware of this and saw pt this am of which Hgb 4.9, so sent to ED. Repeat Hgb 5.8. LA 10.1 most likely multifactorial and due to known cancer, active GIB, and etoh abuse as other VSS and no current concern for sepsis.   - CRS, Oncology, and GI consulted and appreciate recommendations.   - Start octreotide gtt and IV BID PPI  - 3 units pRBCs ordered to give pt.   - Give 500 ml LR IVF bolus and start MIVFs.   - Obtain TTE given SHARMA PTA  - Repeat Hgb and LA later today.   - NPO at midnight    # Leukocytosis:  Likely due to stress from cancer, GIB as well as possible UTI. Afebrile with other VSS.  - Daily CBC    # Probable severe malnutrition:  Poor intake over the past few months with apparent 30 lb wt loss.   - Nutrition consulted  - Start calorie counts.     # Acute etoh abuse  # Etoh use d/o  Pt states she drinks daily but only one tallboy that lasts her the whole day. Appears to minimizing use. Add-on BAL 0.05.   - Start CIWA lorazepam etoh withdrawal protocol.  "  - CD consult if pt interested in OP tx    # Abnormal LFTs:  Most likely due to metastatic cancer and etoh abuse.   - Repeat hepatic panel tomorrow am.    # Possible UTI: Pt c/o increased urinary frequency. Denies dysuria. UA with trace LE, few viviane and 5-10 WBCs. UC NGTD. Started on CTX in ED  - Continue CTX for now and follow up final results of UC     # Bipolar disorder: States \"I feel good\" when asked about her mood. Recently started on Abilify. No longer on Lamictal.  - Continue PTA Abilify 10 mg daily    # BLE edema:  Most likely due to venous insufficiency and hypoalbuminemia  as b/l venous US neg for DVT.   - PCDs.     # Hx of HLD: On statin PTA.  - Hold statin given abnormal LFTs.       Diet: Combination Diet Regular Diet Adult    DVT Prophylaxis: Pneumatic Compression Devices  Hook Catheter: Not present  Central Lines: None  Cardiac Monitoring: None  Code Status: Full Code      Disposition Plan   Expected Discharge: 3-4 days    Anticipated discharge location:   Awaiting care coordination huddle  Delays:    Oncology, CRS and GI eval       The patient's care was discussed with the Attending Physician, Dr. Choudhary, Bedside Nurse and Patient.    Meño Kimble PA-C  Hospitalist Service, GOLD TEAM [address] [place] [address]  Securely message with the Vocera Web Console (learn more here)  Text page via [facility] Paging/Directory   Please see signed in provider for up to date coverage information  _________________________________________________________________    Chief Complaint   Severe anemia in pt with newly diagnosed rectal adenocarcino    History is obtained from the patient and electronic health record    History of Present Illness   Per Dr. Guo's ED note dated 5/5: \"Demetria Goodrich is a 57 year old female with history of alcohol abuse, iron deficiency anemia, HLD, and recently diagnosed rectal cancer by colonoscopy 4/22  presenting to the ED for further evaluation " "of low Hgb. Patient was seen by her PCP today for BLE swelling. Labs revealed Hgb of 4.9. Patient advised to be seen in the ED, came by personal xport     Rectal CA, likely metastatic diagnosed approximately 2 weeks ago.  Coinciding with this diagnosis also with worsening lower extremity edema and increased shortness of breath with exertion.  No flory chest pain today.     On arrival to the ED is pale appearing no abdominal pain no nausea no current lightheadedness.     Endorses darkening of her stool for at least 2 months.\"    Currently pt denies fever, chills, chest pain, SOB, abd pain, nausea and bowel concerns. LBM yesterday and \"was a pebble\". Having urinary frequency but denies dysuria.     Review of Systems    The 10 point Review of Systems is negative other than noted in the HPI or here.     Past Medical History    I have reviewed this patient's medical history and updated it with pertinent information if needed.   Past Medical History:   Diagnosis Date     ASCUS on Pap smear 2010     Cervical high risk HPV (human papillomavirus) test positive 4/1/2022 2005, 2010 ASCUS, neg HR HPV 2008, 2011, 2012 NIL paps 3/13/18 NIL pap, neg HPV 4/1/22 NIL pap, +HR HPV (not 16/18). Plan: cotest in 1 year     Chronic hypomanic disorder (H)      Other and unspecified alcohol dependence, unspecified drinking behavior      Rectal cancer (H) 5/5/2022     Varicose veins of lower extremities with other complications     both legs       Past Surgical History   I have reviewed this patient's surgical history and updated it with pertinent information if needed.  Past Surgical History:   Procedure Laterality Date     COLONOSCOPY N/A 4/22/2022    Procedure: COLONOSCOPY, WITH POLYPECTOMY AND BIOPSY;  Surgeon: Everette Fischer MD;  Location: [facility] OR     COLONOSCOPY N/A 4/22/2022    Procedure: COLONOSCOPY, FLEXIBLE, WITH LESION REMOVAL USING SNARE;  Surgeon: Everette Fischer MD;  Location: [facility] OR     COLONOSCOPY " WITH CO2 INSUFFLATION N/A 2022    Procedure: COLONOSCOPY, WITH CO2 INSUFFLATION;  Surgeon: Everette Fischer MD;  Location: [facility] OR     LIGATN/STRIP LONG & SHORT SAPHEN  +    both legs       Social History   I have reviewed this patient's social history and updated it with pertinent information if needed.  Social History     Tobacco Use     Smoking status: Current Every Day Smoker     Packs/day: 0.07     Years: 33.00     Pack years: 2.31     Types: Other, Cigarettes     Smokeless tobacco: Former User     Quit date: 3/13/2015     Tobacco comment: have Chantex prescription   Substance Use Topics     Alcohol use: Yes     Comment: 12 pack per week     Drug use: No       Family History   I have reviewed this patient's family history and updated it with pertinent information if needed.  Family History   Problem Relation Age of Onset     Cancer Father         Brain tumor, age 32,  age 33     Other Cancer Father         Brain Cancer     Cancer - colorectal Mother         54     Other Cancer Mother         Blood Cancer     Leukemia Mother      Breast Cancer Mother      Colon Cancer Mother      Cancer - colorectal Maternal Uncle         56     Ovarian Cancer No family hx of        Prior to Admission Medications   Prior to Admission Medications   Prescriptions Last Dose Informant Patient Reported? Taking?   ARIPiprazole (ABILIFY) 5 MG tablet   Yes No   Sig: Take 1 tablet by mouth daily   naproxen sodium (ANAPROX) 220 MG tablet   No No   Sig: Take 1 tablet (220 mg) by mouth 2 times daily (with meals)   simvastatin (ZOCOR) 40 MG tablet   No No   Sig: Take 1 tablet (40 mg) by mouth At Bedtime   triamcinolone (KENALOG) 0.5 % external ointment   No No   Sig: APPLY 1 GRAM TOPICALLY TO THE AFFECTED AREA TWICE DAILY   Patient not taking: Reported on 2022      Facility-Administered Medications: None     Allergies   Allergies   Allergen Reactions     No Known Drug Allergies        Physical Exam   Vital  Signs: Temp: 97.8  F (36.6  C) Temp src: Oral BP: 99/69 Pulse: 97   Resp: 18 SpO2: 100 % O2 Device: None (Room air)    Weight: 154 lbs 0 oz  GEN: In NAD  HEENT: NCAT; PERRL; sclerae non-icteric  LUNGS: CTAB  CV: RRR  ABD: +BSs; SNTND  EXT: 2-3+ BLE edema  SKIN: No acute rashes noted on exposed areas.  NEURO: AAOx3; CNs grossly intact; No acute focal deficits noted.      Data   Data reviewed today: I reviewed all medications, new labs and imaging results over the last 24 hours.   CMPRecent Labs   Lab 05/05/22  1155 05/02/22  0956     --    POTASSIUM 3.5  --    CHLORIDE 94  --    CO2 24  --    ANIONGAP 18*  --    *  --    BUN 25  --    CR 1.12* 1.2*   GFRESTIMATED 58* 53*   CONNIE 10.9*  --    MAG 2.0  --    PHOS 3.3  --    PROTTOTAL 7.1  --    ALBUMIN 1.9*  --    BILITOTAL 0.9  --    ALKPHOS 1,475*  --    *  --    ALT 62*  --      CBC  Recent Labs   Lab 05/05/22  1155   WBC 27.3*   RBC 2.04*   HGB 5.8*  5.8*   HCT 20.0*   MCV 98   MCH 27.9   MCHC 29.0*   RDW 25.4*        INR  Recent Labs   Lab 05/05/22  1155   INR 1.52*

## 2022-05-05 NOTE — ED NOTES
Critical Lab Value:  Lactic Acid: 8.2  Lab requested for another CBC to redraw hemoglobin.  MD notified.

## 2022-05-05 NOTE — PHARMACY-VANCOMYCIN DOSING SERVICE
"Pharmacy Vancomycin Initial Note  Date of Service May 5, 2022  Patient's  1965  57 year old, female    Indication: Sepsis    Current estimated CrCl = Estimated Creatinine Clearance: 55.7 mL/min (A) (based on SCr of 1.12 mg/dL (H)).    Creatinine for last 3 days  2022: 10:01 AM Creatinine 1.03 mg/dL; 11:55 AM Creatinine 1.12 mg/dL    Recent Vancomycin Level(s) for last 3 days  No results found for requested labs within last 72 hours.      Vancomycin IV Administrations (past 72 hours)      No vancomycin orders with administrations in past 72 hours.                Nephrotoxins and other renal medications (From now, onward)    Start     Dose/Rate Route Frequency Ordered Stop    22 1900  vancomycin (VANCOCIN) 1,750 mg in sodium chloride 0.9 % 500 mL intermittent infusion         1,750 mg  over 120 Minutes Intravenous ONCE 22 1825      22 1800  piperacillin-tazobactam (ZOSYN) 4.5 g vial to attach to  mL bag        Note to Pharmacy: For [facility], [facility] and [facility]: For Zosyn-naive patients, use the \"Zosyn initial dose + extended infusion\" order panel.    4.5 g  over 30 Minutes Intravenous EVERY 6 HOURS 22 1757            Contrast Orders - past 72 hours (72h ago, onward)    None          Insight[employer] Prediction of Planned Initial Vancomycin Regimen  Loading dose: 1750 mg at 19:30 2022.  Regimen: 1500 mg IV every 24 hours.  Start time: 18:27 on 2022  Exposure target: AUC24 (range)400-600 mg/L.hr   AUC24,ss: 541 mg/L.hr  Probability of AUC24 > 400: 81 %  Ctrough,ss: 15.4 mg/L  Probability of Ctrough,ss > 20: 28 %  Probability of nephrotoxicity (Lodise KUMAR ): 11 %          Plan:  1. Give vancomycin 1750mg IV x1, then start vancomycin 1500mg IV q24 hours  2. Vancomycin monitoring method: AUC  3. Vancomycin therapeutic monitoring goal: 400-600 mg*h/L  4. Pharmacy will check vancomycin levels as appropriate in 1-3 Days.    5. Serum creatinine levels will be ordered daily for the first " week of therapy and at least twice weekly for subsequent weeks.      Chemo Bernal, PharmD, BCPS

## 2022-05-05 NOTE — CONSULTS
Medical Oncology Consult    Patient name: Demetria Goodrich  YOB: 1965  Visit date: 5/5/2022    Oncologic History:  Diagnosis/ stage of cancer:  De-frida advanced/ metastatic upper rectal adenocarcinoma (moderately differentiated, mismatch repair intact) with mets to liver and likely lungs    Prior treatment(s): none  Current treatment(s):  none  Treatment intent: will be palliative    Care Team  Oncology care team to be discussed    Reason for consultation/ patient visit: New rectal cancer and anemia    History of presenting illness:  Ms. Demetria Goodrich is a 57 year old woman.    Previously quite healthy.   No major physical medical conditions.    Since Jan 2022, more fatigue and 30-40 lb weight loss.  Went from ECOG 0-- fully functional, maybe ECOG 2  Minimal appetite  Smaller stool caliber-- maybe some dark stools    Workup revealed a de-frida advanced/ metastatic rectal adenocarcinoma (moderately differentiated, mismatch repair intact) with mets to liver and likely lungs.  She was due to meet with Dr. Nelson and Dr. Pollack of surg onc and med onc this week, but came to ED with severe anemia after coming to PCP with pedal edema.    Workup so far:  Colonoscopy with Dr. Everette Fischer on 4/22/2022: very obstructive tumor in proximal rectum-- biopsied, could not pass through    CEA 3,800    CT CAP and MR rectum on 5/2/2022  Multiple liver and sub-cm lung lesions  Upper rectal T3Nx tumor    In ED, on 5/5/2022  WBC 28, 80% neutrophil  Hb 4.8  Albumin 1.9  Cr 1  Lactate 10  ALT/ AST 60/ 600  Bili 0.9  DVT US neg    Interval history:  In ED today, sitting up  Feels little bit better than when she came in, but very wiped out    Review of Systems: 14 point ROS negative other than the symptoms noted above in the HPI.    Past medical history:  Hypercholesterolemia, bipolar 1, nicotine and alcohol use, varicose veins    Past surgical history:  Vein stripping    Social history:   Lives with boyfriend,  "Dagoberto, of 22 years, [place]. Dagoberto cell: [phone].  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      Family history:  Father--  in his 40s from brain tumor  Mother- leukemia  2 siblings-- colon polyps?  No biological children    Allergies:   NKDA    Outpatient medications:   Statin  Aripirazole    Physical examination:  Vital signs: /66   Pulse 104   Temp 97.9  F (36.6  C) (Oral)   Resp 22   Ht 1.651 m (5' 5\")   Wt 69.9 kg (154 lb)   LMP 2012   SpO2 100%   BMI 25.63 kg/m      ECOG performance status:  Currently 3  Vascular access: Multiple PIV    GENERAL: Tired woman, in bed  HEENT: pallor  NECK: No JVD/LAD.  LUNGS: Normal work of breathing.   CARDIOVASCULAR: tachycardic  ABDOMEN: Soft, nontender  EXTREMITIES: No cyanosis, some edema  NEUROLOGIC: No focal deficits  LYMPH NODE EXAM: No palpable adenopathy.        Lab data:  I have personally reviewed the following lab data: CBC CMP CEA  CEA 3,800  WBC 28, 80% neutrophil  Hb 4.8  Albumin 1.9  Cr 1  Lactate 10  ALT/ AST 60/ 600  Bili 0.9    Radiology data:  I have personally reviewed the images of / or the following radiology data:    CT CAP and MR rectum on 2022  Multiple liver and sub-cm lung lesions  Upper rectal T3Nx tumor    Pathology and other data:  I have personally reviewed and interpreted the following data:  Rectal biopsy with moderately differentiated adenocarcinoma      Assessment and Plan:  Ms. Demetria Goodrich is a 57 year old woman.  Previously healthy.  In late 2022, diagnosed with de-frida advanced/ metastatic upper rectal adenocarcinoma (moderately differentiated, mismatch repair intact) with mets to liver and likely lungs.    CEA very high to >3000.  Primary tumor is quite obstructive-- I am (happily) surprised she is still having (very " small) BMs  Liver mets are quite diffuse-- ALT up to 400, bili right now 0.9  Acute? On chronic blood loss anemia with very high lactate and leukocytosis-- latter 2 could be from anemic stress    This is a difficult situation.   She needs blood and resuscitation.  Rule out infection with blood cultures, and empiric abx -- this may be overkill but she could get very sick very fast.    As her Hb comes up with aggressive resuscitation and as GI decides if/when to do an upper GI scope (could also have gastritis etc from alcohol/ smoking), we will need to make some cancer-specific decisions.    I am glad CRC surgery is already on board.    Resecting primary tumor with metastatic disease is not standard of care-- only done if primary tumor is causing local issues-- bleed, obstruction, perforation etc. Let us see if this tumor qualifies for that.    More concerningly, she has an impressive burden of liver disease which is quite diffuse and impressive-- primary tumor resection will not address this. For this, we need chemo. In fact, resecting primary will make us wait a while before we can give chemo-- so this may favor a chemo first approach.  If we do do chemo, we prefer to do outpatient and patients to be fit and functional. I am not sure though, if and how fast this will happen. Although I am encouraged that most of her current condition is tumor (and more acutely, anemia) related. Albumin and nutrition also quite poor. In some situations, we do inpatient chemo. Given time taken outpatient to set up port/ chemo, and her LFTs and liver burden of disease, could consider chemo next week inpatient if she does better.    Her tumor Is mismatch repair proficient. We can send other molecular studies (will take 2-3 weeks) but if choose systemic therapy, could go with something like FOLFOX to try and get response. FOLFOXIRI we need to be more careful with liver, but maybe we will need to do that given that our only hope is we get  response.    Will discuss at CRC tumor board on 5/9, will also discuss with CRC surgery team.  Aggressive resuscitation with PRBC and fluid, consider upper GI scope, PPI, cultures/ antibiotics.  We will follow closely.  We will send NGS on primary tumor.    I am concerned about her -- acutely and subactutely and chronically.     Cl Saunders M.D.   of Medicine  Division of Hematology, Oncology and Transplantation  [facility]

## 2022-05-05 NOTE — PROGRESS NOTES
"Brief Medicine Cross Cover Note    Patient w/ repeat LA 8.2 (10.1), VBG 7.46/37/28/26, Hgb 5.4 (5.8) following 1 U PRBC. BP 80/60s w/ MAPS 60-70. BL SBP appears 130's. No tachycardia, Afebrile. ABX broadened. No melenic stool on admission. Plan for 2 additional PRBC, IVF, and labs. Pt A&O x4. Complains of intermittent LUQ abdominal pain. CTA A/P ordered. PICC line ordered for additional access.  Discussed with ICU who agree with the above plan. Possible ICU transfer if not improving w/ fluid resuscitation. Serial LA and Hgb. Discussed POC with bedside ED RN, ICU float RN, Triage MD, Dr. Pelaez at bedside and ART Genna CARLISLE.     BP (!) 87/62   Pulse 91   Temp 98.3  F (36.8  C) (Oral)   Resp 14   Ht 1.651 m (5' 5\")   Wt 69.9 kg (154 lb)   LMP 11/30/2012   SpO2 97%   BMI 25.63 kg/m        Dayna Bangura PA-C  Internal Medicine Hospitalist Service  [facility]  Pager: [phone]        "

## 2022-05-05 NOTE — CONSULTS
Colon and Rectal Surgery Consultation Note  [facility]    Demetria Goodrich MRN# [id number]   Age: 57 year old YOB: 1965     Date of Admission:  5/5/2022    Reason for consult: Recent diagnosis of rectal cancer        Requesting physician: Dr. Guo       Level of consult: Consult, follow and place orders           Assessment:   This is a 57 year old female with a recent diagnosis of T3d rectal adenocarcinoma with metastasis to the liver who presented to the ED from PCP office due to a hgb of 4.9. Patient has been getting increasingly more fatigued and weak with black tarry stools over the past several months. Reports recent weight loss and lack of appetite. Colonoscopy on 4/22 revealed fungating mass at 13 cm from the anal verge which the colonoscopy was unable to bypass. Still having BM every other day. She is hemodynamically stable getting blood transfusions          Recommendations:   - Admit to medicine  - Consult Rad/Onchology  - Agree with blood transfusion as needed  - Colorectal surgery will continue to follow           History of Present Illness:   CC: Hgb 4.9 at doctors office     This is a 57 year old female with a recent diagnosis of metastatic rectal cancer as seen on colonoscopy from a few weeks ago. Patient states that for the past few months she has been experiencing decreased appetite, 30lb unexpected weight loss. She reports worsening fatigue and weakness to the point that she is having a hard time climbing the 3 steps it takes to get into her home. Patient states that she has been noticing tarry black stools. She has been having a BM every other day which has been soft. She had a colonoscopy 2 weeks ago which showed a large fungating mass 13 cm from the anal verge which the scope was unable to bypass at that time. Pathology revealed adenocarcinoma. She has since been worked up with CT, MRI, CEA. She was scheduled to meet with colorectal surgery later today in the  office and with onchology tomorrow. At this time patient denies any abdominal pain, rectal pain, nausea, vomiting, chest pain. She endorsed dizziness, weakness, fatigue. History of colon cancer in her uncle.              Past Medical History:     Past Medical History:   Diagnosis Date     ASCUS on Pap smear 2010     Cervical high risk HPV (human papillomavirus) test positive 4/1/2022 2005, 2010 ASCUS, neg HR HPV 2008, 2011, 2012 NIL paps 3/13/18 NIL pap, neg HPV 4/1/22 NIL pap, +HR HPV (not 16/18). Plan: cotest in 1 year     Chronic hypomanic disorder (H)      Other and unspecified alcohol dependence, unspecified drinking behavior      Rectal cancer (H) 5/5/2022     Varicose veins of lower extremities with other complications     both legs             Past Surgical History:     Past Surgical History:   Procedure Laterality Date     COLONOSCOPY N/A 4/22/2022    Procedure: COLONOSCOPY, WITH POLYPECTOMY AND BIOPSY;  Surgeon: Everette Fischer MD;  Location: [facility] OR     COLONOSCOPY N/A 4/22/2022    Procedure: COLONOSCOPY, FLEXIBLE, WITH LESION REMOVAL USING SNARE;  Surgeon: Everette Fischer MD;  Location: [facility] OR     COLONOSCOPY WITH CO2 INSUFFLATION N/A 4/22/2022    Procedure: COLONOSCOPY, WITH CO2 INSUFFLATION;  Surgeon: Everette Fischer MD;  Location: [facility] OR     LIGATN/STRIP LONG & SHORT SAPHEN  1990+1995    both legs             Social History:     Social History     Socioeconomic History     Marital status: Single     Spouse name: Not on file     Number of children: Not on file     Years of education: Not on file     Highest education level: Not on file   Occupational History     Not on file   Tobacco Use     Smoking status: Current Every Day Smoker     Packs/day: 0.07     Years: 33.00     Pack years: 2.31     Types: Other, Cigarettes     Smokeless tobacco: Former User     Quit date: 3/13/2015     Tobacco comment: have Chantex prescription   Substance and Sexual Activity     Alcohol  "use: Yes     Comment: 12 pack per week     Drug use: No     Sexual activity: Yes     Partners: Male     Birth control/protection: None   Other Topics Concern     Parent/sibling w/ CABG, MI or angioplasty before 65F 55M? No   Social History Narrative     Not on file     Social Determinants of Health     Financial Resource Strain: Not on file   Food Insecurity: Not on file   Transportation Needs: Not on file   Physical Activity: Not on file   Stress: Not on file   Social Connections: Not on file   Intimate Partner Violence: Not on file   Housing Stability: Not on file             Family History:     Family History   Problem Relation Age of Onset     Cancer Father         Brain tumor, age 32,  age 33     Other Cancer Father         Brain Cancer     Cancer - colorectal Mother         54     Other Cancer Mother         Blood Cancer     Leukemia Mother      Breast Cancer Mother      Colon Cancer Mother      Cancer - colorectal Maternal Uncle         56     Ovarian Cancer No family hx of              Allergies:      Allergies   Allergen Reactions     No Known Drug Allergies              Medications:   No current facility-administered medications on file prior to encounter.  ARIPiprazole (ABILIFY) 5 MG tablet, Take 1 tablet by mouth daily  lamoTRIgine (LAMICTAL) 25 MG tablet, Take 1 tablet (25 mg) by mouth daily  naproxen sodium (ANAPROX) 220 MG tablet, Take 1 tablet (220 mg) by mouth 2 times daily (with meals)  simvastatin (ZOCOR) 40 MG tablet, Take 1 tablet (40 mg) by mouth At Bedtime  triamcinolone (KENALOG) 0.5 % external ointment, APPLY 1 GRAM TOPICALLY TO THE AFFECTED AREA TWICE DAILY (Patient not taking: Reported on 2022)              Review of Systems:      All other review of systems negative, except for what is mentioned above        Physical Exam:   BP 94/63   Pulse 98   Temp 98.2  F (36.8  C) (Oral)   Resp 21   Ht 1.651 m (5' 5\")   Wt 69.9 kg (154 lb)   LMP 2012   SpO2 99%   BMI 25.63 kg/m  "   General: Alert, interactive, NAD  Resp: no acute respiratory distress, no crackles or wheezes  Cardiac: no tachycardia   Abdomen: Soft, nontender, nondistended.  No HSM or masses, no rebound or guarding. Rectal exam was deferred at this time, patient denies blood per rectum  Extremities: No LE edema or obvious joint abnormalities  Skin: Warm and dry, no jaundice or rash  Neuro: A&Ox3, CN 2-12 intact, 4/5 strength to the upper and lower extremities             Data:   All laboratory data reviewed  All imaging studies reviewed by me.         Adalberto Kebede..................5/5/2022   1:51 PM  Colon and Rectal Surgery  PGY1      Assessment and plan was discussed with attending Dr. Nelson Who is in agreement

## 2022-05-05 NOTE — PROVIDER NOTIFICATION
Provider notified (name): prashanth Mcintosh MD [id number] ([facility])  Reason for notification:HIGH ED [address] [place] [id number] LR is currently order for IV fluids. RRT nurse says we will be able to re-hydrate pt better with NS and more meds are compatible with NS so we can give more meds before picc placed. Call RN pls  Recommendation/request given to provider: order NS IVF  Response from provider: waiting for response

## 2022-05-05 NOTE — ED PROVIDER NOTES
ED Provider Note  [facility]      History     Chief Complaint   Patient presents with     Abnormal Labs     Hgb 4.9     HPI  Demetria Goodrich is a 57 year old female with history of alcohol abuse, iron deficiency anemia, HLD, and recently diagnosed rectal cancer by colonoscopy 4/22  presenting to the ED for further evaluation of low Hgb. Patient was seen by her PCP today for BLE swelling. Labs revealed Hgb of 4.9. Patient advised to be seen in the ED, came by personal xport    Rectal CA, likely metastatic diagnosed approximately 2 weeks ago.  Coinciding with this diagnosis also with worsening lower extremity edema and increased shortness of breath with exertion.  No flory chest pain today.    On arrival to the ED is pale appearing no abdominal pain no nausea no current lightheadedness.    Endorses darkening of her stool for at least 2 months.      5/2 CT CAP  IMPRESSION:   1. The liver is enlarged by innumerable hepatic metastases. Prominent  and mildly enlarged heterogeneous peg hepatis lymph nodes are  suspicious for metastatic disease as well.  2. Multiple bilateral metastatic pulmonary nodules measuring up to 9  mm.   3. Please refer to same day MRI for further details regarding the  patient's known rectal mass.  4. Small volume ascites.    Past Medical History  Past Medical History:   Diagnosis Date     ASCUS on Pap smear 2010     Cervical high risk HPV (human papillomavirus) test positive 4/1/2022 2005, 2010 ASCUS, neg HR HPV 2008, 2011, 2012 NIL paps 3/13/18 NIL pap, neg HPV 4/1/22 NIL pap, +HR HPV (not 16/18). Plan: cotest in 1 year     Chronic hypomanic disorder (H)      Other and unspecified alcohol dependence, unspecified drinking behavior      Rectal cancer (H) 5/5/2022     Varicose veins of lower extremities with other complications     both legs     Past Surgical History:   Procedure Laterality Date     COLONOSCOPY N/A 4/22/2022    Procedure: COLONOSCOPY, WITH  "POLYPECTOMY AND BIOPSY;  Surgeon: Everette Fischer MD;  Location: [facility]     COLONOSCOPY N/A 2022    Procedure: COLONOSCOPY, FLEXIBLE, WITH LESION REMOVAL USING SNARE;  Surgeon: Everette Fischer MD;  Location: [facility]     COLONOSCOPY WITH CO2 INSUFFLATION N/A 2022    Procedure: COLONOSCOPY, WITH CO2 INSUFFLATION;  Surgeon: Everette Fischer MD;  Location: [facility] OR     LIGATN/STRIP LONG & SHORT SAPHEN  +    both legs     ARIPiprazole (ABILIFY) 5 MG tablet  naproxen sodium (ANAPROX) 220 MG tablet  simvastatin (ZOCOR) 40 MG tablet  triamcinolone (KENALOG) 0.5 % external ointment      Allergies   Allergen Reactions     No Known Drug Allergies      Family History  Family History   Problem Relation Age of Onset     Cancer Father         Brain tumor, age 32,  age 33     Other Cancer Father         Brain Cancer     Cancer - colorectal Mother         54     Other Cancer Mother         Blood Cancer     Leukemia Mother      Breast Cancer Mother      Colon Cancer Mother      Cancer - colorectal Maternal Uncle         56     Ovarian Cancer No family hx of      Social History   Social History     Tobacco Use     Smoking status: Current Every Day Smoker     Packs/day: 0.07     Years: 33.00     Pack years: 2.31     Types: Other, Cigarettes     Smokeless tobacco: Former User     Quit date: 3/13/2015     Tobacco comment: have Chantex prescription   Substance Use Topics     Alcohol use: Yes     Comment: 12 pack per week     Drug use: No      Past medical history, past surgical history, medications, allergies, family history, and social history were reviewed with the patient. No additional pertinent items.       Review of Systems   10 point review of symptoms was performed and is negative except as noted above.     Physical Exam   BP: 117/66  Pulse: 83  Temp: 98  F (36.7  C)  Resp: 16  Height: 165.1 cm (5' 5\")  Weight: 70 kg (154 lb 5.2 oz)  SpO2: 100 %  Physical Exam  GEN: Pale appearing " but non toxic, cooperative and conversant.   HEENT: The head is normocephalic and atraumatic. Pupils are equal round and reactive to light. Extraocular motions are intact. There is no facial swelling. The neck is nontender and supple.   CV: Regular tachycardia 2+ radial pulses bilaterally.  PULM: Clear to auscultation bilaterally.  ABD: Soft, nontender, nondistended.   EXT: Full range of motion.  No edema.  NEURO: Cranial nerves II through XII are intact and symmetric. Bilateral upper and lower extremities grossly show full range of motion without any focal deficits.   PSYCH: Calm and cooperative, interactive.     ED Course      Procedures                     Results for orders placed or performed during the hospital encounter of 05/05/22   US Lower Extremity Venous Bilateral Port     Status: None    Narrative    EXAMINATION: DOPPLER VENOUS ULTRASOUND OF BILATERAL LOWER EXTREMITIES,  5/5/2022 3:18 PM     COMPARISON: None.    HISTORY: Edema    TECHNIQUE:  Gray-scale evaluation with compression, spectral flow and  color Doppler assessment of the deep venous system of both legs from  groin to knee, and then at the ankles.    FINDINGS:  Right: the common femoral, femoral, popliteal and posterior tibial  veins demonstrate normal compressibility and blood flow.    Left: the common femoral, femoral, popliteal and posterior tibial  veins demonstrate normal compressibility and blood flow.      Impression    IMPRESSION:  1.  No evidence of deep venous thrombosis in either lower extremity.    DEBRA ROBIN MD         SYSTEM ID:  [id number]   [facility] Draw     Status: None    Narrative    The following orders were created for panel order [facility] Draw.  Procedure                               Abnormality         Status                     ---------                               -----------         ------                     Extra Blue Top Tube[[id number]]                              Final result               Extra Red Top  Tube[[id number]]                               Final result               Extra Green Top (Lithium...[[id number]]                      Final result               Extra Purple Top Tube[[id number]]                            Final result                 Please view results for these tests on the individual orders.   Hemoglobin     Status: Abnormal   Result Value Ref Range    Hemoglobin 5.8 (LL) 11.7 - 15.7 g/dL   Extra Blue Top Tube     Status: None   Result Value Ref Range    Hold Specimen JIC    Extra Red Top Tube     Status: None   Result Value Ref Range    Hold Specimen JIC    Extra Green Top (Lithium Heparin) Tube     Status: None   Result Value Ref Range    Hold Specimen JIC    Extra Purple Top Tube     Status: None   Result Value Ref Range    Hold Specimen JIC    Comprehensive metabolic panel     Status: Abnormal   Result Value Ref Range    Sodium 136 133 - 144 mmol/L    Potassium 3.5 3.4 - 5.3 mmol/L    Chloride 94 94 - 109 mmol/L    Carbon Dioxide (CO2) 24 20 - 32 mmol/L    Anion Gap 18 (H) 3 - 14 mmol/L    Urea Nitrogen 25 7 - 30 mg/dL    Creatinine 1.12 (H) 0.52 - 1.04 mg/dL    Calcium 10.9 (H) 8.5 - 10.1 mg/dL    Glucose 102 (H) 70 - 99 mg/dL    Alkaline Phosphatase 1,475 (H) 40 - 150 U/L     (H) 0 - 45 U/L    ALT 62 (H) 0 - 50 U/L    Protein Total 7.1 6.8 - 8.8 g/dL    Albumin 1.9 (L) 3.4 - 5.0 g/dL    Bilirubin Total 0.9 0.2 - 1.3 mg/dL    GFR Estimate 58 (L) >60 mL/min/1.73m2   INR     Status: Abnormal   Result Value Ref Range    INR 1.52 (H) 0.85 - 1.15   Partial thromboplastin time     Status: Normal   Result Value Ref Range    aPTT 33 22 - 38 Seconds   Lactic acid whole blood     Status: Abnormal   Result Value Ref Range    Lactic Acid 10.1 (HH) 0.7 - 2.0 mmol/L   Asymptomatic COVID-19 Virus (Coronavirus) by PCR Nasopharyngeal     Status: Normal    Specimen: Nasopharyngeal; Swab   Result Value Ref Range    SARS CoV2 PCR Negative Negative, Testing sent to reference lab. Results will be returned  via unsolicited result    Narrative    Testing was performed using the Xpert Xpress SARS-CoV-2 Assay on the  Cepheid Gene-Xpert Instrument Systems. Additional information about  this Emergency Use Authorization (EUA) assay can be found via the Lab  Guide. This test should be ordered for the detection of SARS-CoV-2 in  individuals who meet SARS-CoV-2 clinical and/or epidemiological  criteria. Test performance is unknown in asymptomatic patients. This  test is for in vitro diagnostic use under the FDA EUA for  laboratories certified under CLIA to perform high complexity testing.  This test has not been FDA cleared or approved. A negative result  does not rule out the presence of PCR inhibitors in the specimen or  target RNA in concentration below the limit of detection for the  assay. The possibility of a false negative should be considered if  the patient's recent exposure or clinical presentation suggests  COVID-19. This test was validated by the [facility]. This laboratory is certified under  the Clinical Laboratory Improvement Amendments of 1988 (CLIA-88) as  qualified to perform high complexity laboratory testing.     CBC with platelets and differential     Status: Abnormal   Result Value Ref Range    WBC Count 27.3 (H) 4.0 - 11.0 10e3/uL    RBC Count 2.04 (L) 3.80 - 5.20 10e6/uL    Hemoglobin 5.8 (LL) 11.7 - 15.7 g/dL    Hematocrit 20.0 (L) 35.0 - 47.0 %    MCV 98 78 - 100 fL    MCH 27.9 26.5 - 33.0 pg    MCHC 29.0 (L) 31.5 - 36.5 g/dL    RDW 25.4 (H) 10.0 - 15.0 %    Platelet Count 313 150 - 450 10e3/uL    % Neutrophils 77 %    % Lymphocytes 12 %    % Monocytes 7 %    % Eosinophils 2 %    % Basophils 0 %    % Immature Granulocytes 2 %    NRBCs per 100 WBC 0 <1 /100    Absolute Neutrophils 21.3 (H) 1.6 - 8.3 10e3/uL    Absolute Lymphocytes 3.3 0.8 - 5.3 10e3/uL    Absolute Monocytes 1.8 (H) 0.0 - 1.3 10e3/uL    Absolute Eosinophils 0.5 0.0 - 0.7 10e3/uL     Absolute Basophils 0.1 0.0 - 0.2 10e3/uL    Absolute Immature Granulocytes 0.4 <=0.4 10e3/uL    Absolute NRBCs 0.0 10e3/uL   Phosphorus     Status: Normal   Result Value Ref Range    Phosphorus 3.3 2.5 - 4.5 mg/dL   Magnesium     Status: Normal   Result Value Ref Range    Magnesium 2.0 1.6 - 2.3 mg/dL   Alcohol ethyl     Status: Abnormal   Result Value Ref Range    Alcohol ethyl 0.05 (H) <=0.01 g/dL   Adult Type and Screen     Status: None   Result Value Ref Range    ABO/RH(D) A NEG     Antibody Screen Negative Negative    SPECIMEN EXPIRATION DATE [id number]    Prepare red blood cells (unit)     Status: None (Preliminary result)   Result Value Ref Range    CROSSMATCH Compatible     UNIT ABO/RH A Neg     Unit Number [id number]     Unit Status Ready     Blood Component Type Red Blood Cells     Product Code S4863R74     CODING SYSTEM [id number]     UNIT TYPE ISBT 0600    Prepare red blood cells (unit)     Status: None (Preliminary result)   Result Value Ref Range    CROSSMATCH Compatible     UNIT ABO/RH A Neg     Unit Number [id number]     Unit Status Issued     Blood Component Type Red Blood Cells     Product Code W4874E16     CODING SYSTEM [id number]     UNIT TYPE ISBT 0600     ISSUE DATE AND TIME [id number]    Prepare red blood cells (unit)     Status: None (Preliminary result)   Result Value Ref Range    CROSSMATCH Compatible     UNIT ABO/RH A Neg     Unit Number [id number]     Unit Status Ready     Blood Component Type Red Blood Cells     Product Code W2811N82     CODING SYSTEM [id number]     UNIT TYPE ISBT 0600    ABO/Rh type and screen     Status: None    Narrative    The following orders were created for panel order ABO/Rh type and screen.  Procedure                               Abnormality         Status                     ---------                               -----------         ------                     Adult Type and Screen[[id number]]                            Final result                  Please view results for these tests on the individual orders.   CBC with platelets differential     Status: Abnormal    Narrative    The following orders were created for panel order CBC with platelets differential.  Procedure                               Abnormality         Status                     ---------                               -----------         ------                     CBC with platelets and d...[[id number]]  Abnormal            Final result                 Please view results for these tests on the individual orders.   Results for orders placed or performed in visit on 05/05/22   UA Macro with Reflex to Micro and Culture - lab collect     Status: Abnormal    Specimen: Urine, Midstream   Result Value Ref Range    Color Urine Yellow Colorless, Straw, Light Yellow, Yellow    Appearance Urine Clear Clear    Glucose Urine Negative Negative mg/dL    Bilirubin Urine Negative Negative    Ketones Urine Negative Negative mg/dL    Specific Gravity Urine 1.015 1.003 - 1.035    Blood Urine Large (A) Negative    pH Urine 5.0 5.0 - 7.0    Protein Albumin Urine Negative Negative mg/dL    Urobilinogen Urine 0.2 0.2, 1.0 E.U./dL    Nitrite Urine Negative Negative    Leukocyte Esterase Urine Trace (A) Negative   Urine Microscopic     Status: Abnormal   Result Value Ref Range    Bacteria Urine Few (A) None Seen /HPF    RBC Urine 10-25 (A) 0-2 /HPF /HPF    WBC Urine 5-10 (A) 0-5 /HPF /HPF    Squamous Epithelials Urine Few (A) None Seen /LPF    Narrative    Urine Culture not indicated     Medications   ARIPiprazole (ABILIFY) tablet 10 mg (has no administration in time range)   cefTRIAXone (ROCEPHIN) 1 g vial to attach to  mL bag for ADULTS or NS 50 mL bag for PEDS (has no administration in time range)   lidocaine 1 % 0.1-1 mL (has no administration in time range)   lidocaine (LMX4) cream (has no administration in time range)   sodium chloride (PF) 0.9% PF flush 3 mL (has no administration in time range)    sodium chloride (PF) 0.9% PF flush 3 mL (has no administration in time range)   melatonin tablet 1 mg (has no administration in time range)   flumazenil (ROMAZICON) injection 0.2 mg (has no administration in time range)   LORazepam (ATIVAN) tablet 1-2 mg (has no administration in time range)   thiamine (B-1) tablet 100 mg (100 mg Oral Given 5/5/22 1548)   folic acid (FOLVITE) tablet 1 mg (1 mg Oral Given 5/5/22 1547)   multivitamin w/minerals (THERA-VIT-M) tablet 1 tablet (1 tablet Oral Given 5/5/22 1547)   lactated ringers BOLUS 500 mL (500 mLs Intravenous New Bag 5/5/22 1549)   lactated ringers infusion (has no administration in time range)   pantoprazole (PROTONIX) IV push injection 40 mg (has no administration in time range)   octreotide (sandoSTATIN) 1,250 mcg in D5W 250 mL (has no administration in time range)   cefTRIAXone (ROCEPHIN) 1 g vial to attach to  mL bag for ADULTS or NS 50 mL bag for PEDS (0 g Intravenous Stopped 5/5/22 1315)           Critical care time: 35 min of CC time for pt with emergent GI bleed and life threatening anemia.     Assessments & Plan (with Medical Decision Making)   57F with hx as noted p/w severe, symptomatic anemia consistent with GI bleed, with rectal CA of most likely etiology.   ddx also includes upper GI bleed, lower GI bleed from other sources, platelet dysfunction, hypercoagulable states, among others which are less likely    Emergent transfusion ordered and patient actively being transfused following consent.  Established 2 18-gauge IVs    Additionally labs notable for lactate of 10.1, most likely related to poor perfusional state.  WBC 27, suspect related to Malignancy vs UTI.     - Will transfuse and repeat lactate.   - ceftriaxone for possible UTI   - Discussed and admitted to IM to [facility]. May consider re-evaluation for downgrade once transfused and if lactic acid improved.     Patient is otherwise very well-appearing nontoxic though weak, though not acutely  so.    Colorectal surgery contacted to evaluate the patient and be on standby in case of an emergent lower GI bleed process related to the malignancy.  Patient seen and they are aware.    Patient admitted to internal medicine/oncology.  Transfusion ongoing and well-tolerated.  Patient signed out to the afternoon attending.      I have reviewed the nursing notes. I have reviewed the findings, diagnosis, plan and need for follow up with the patient.    New Prescriptions    No medications on file       Final diagnoses:   Gastrointestinal hemorrhage, unspecified gastrointestinal hemorrhage type   Metastasis from rectal cancer (H)       --  Yovanny Guo MD  [facility] EMERGENCY DEPARTMENT  5/5/2022     Yovanny Guo MD  05/05/22 [id number]

## 2022-05-05 NOTE — PROVIDER NOTIFICATION
Provider notified (name): Gold cross cover MD 1221 ([facility])  Reason for notification:HIGH: ED [address] [place] [id number] pt is receiving blood and lab cannot draw labs during blood transfusion. Option to stop blood for 30min (per protocol) then draw labs. Recommendation/request given to provider: Reached text limit, please call RN   Response from provider:  Stop blood X30min then lab can draw labs

## 2022-05-05 NOTE — CONSULTS
"    GASTROENTEROLOGY CONSULTATION      Date of Admission:  5/5/2022          Chief Complaint:   We were asked by Jordan Hinton MD of   to evaluate this patient with \"melena, acute blood loss anemia\".            ASSESSMENT AND RECOMMENDATIONS:   Assessment:  Demetria Goodrich is a 57 year old female with a history of T3d rectal adenocarcinoma with metastasis to the liver, anemia, tobacco presents from clinic with hgb 4.9 (though 5.9 here) versus hgb 8.4 on 4/1. GI consulted for anemia.      # Acute on chronic anemia   - HB 4.9, most recent HB 8.4 on 4/1/22     # Metastatic obstructive rectal adenocarcinoma   - Completely obstructing tumor in the proximal rectum extending from 12cm proximal to the anal verge to at least 16cm proximal to the anal verge at which point the lumen is sufficiently narrowed so that neither pediatric colonoscope, nor EGD scope could pass through.  - Oncology is following.     Recommendations  - No plans for inpatient GI procedure at this point. No evidence of overt GI bleeding. GI service is available to help if needed by colorectal surgery and internal medicine teams.   - We will consider endoscopic evaluation if overt GI bleeding develops. The patient had an obstructive colon mass that will make lower endoscopic evaluation challenging.   - Continue supportive care with blood transfusions to keep HB > 7 g/dl  - Work up for other etiologies of anemia: hemolytic, nutritional.     Gastroenterology follow up recommendations: pending clinical course       Patient care plan discussed with LIZABETH Nicole, GI staff physician. Thank you for involving us in this patient's care. Please do not hesitate to contact the GI service with any questions or concerns.     Tiki Christy M.D  GI fellow  [address]  Department of Gastroenterology   -------------------------------------------------------------------------------------------------------------------   History is obtained from the patient and the " medical record.          History of Present Illness:   Demetria Goodrich is a 57 year old female with a history of T3d rectal adenocarcinoma with metastasis to the liver, anemia, tobacco presents from clinic with hgb 4.9 (though 5.9 here) versus hgb 8.4 on 4/1. GI consulted for acute anemia.  The patient was sent to the ED for HB of 4.9 in the OP. She denies symptoms of overt GI bleeding: melena, hematochezia, hematemesis. No external bleeding from nose, skin, urinary bladder, uterus. Not on anticoagulation. Stools are dark green, but not black. No abdominal pain.   The patient has a recent dx of rectal denocarcinoma on 4/22/2022 colonoscopy. c  CT 5/2 with liver mets. MR rectum for staging 5/2.             Past Medical History:   Reviewed and edited as appropriate  Past Medical History:   Diagnosis Date     ASCUS on Pap smear 2010     Cervical high risk HPV (human papillomavirus) test positive 4/1/2022 2005, 2010 ASCUS, neg HR HPV 2008, 2011, 2012 NIL paps 3/13/18 NIL pap, neg HPV 4/1/22 NIL pap, +HR HPV (not 16/18). Plan: cotest in 1 year     Chronic hypomanic disorder (H)      Other and unspecified alcohol dependence, unspecified drinking behavior      Rectal cancer (H) 5/5/2022     Varicose veins of lower extremities with other complications     both legs            Past Surgical History:   Reviewed and edited as appropriate   Past Surgical History:   Procedure Laterality Date     COLONOSCOPY N/A 4/22/2022    Procedure: COLONOSCOPY, WITH POLYPECTOMY AND BIOPSY;  Surgeon: Everette Fischer MD;  Location: [facility] OR     COLONOSCOPY N/A 4/22/2022    Procedure: COLONOSCOPY, FLEXIBLE, WITH LESION REMOVAL USING SNARE;  Surgeon: Everette Fischer MD;  Location: [facility] OR     COLONOSCOPY WITH CO2 INSUFFLATION N/A 4/22/2022    Procedure: COLONOSCOPY, WITH CO2 INSUFFLATION;  Surgeon: Everette Fischer MD;  Location: [facility] OR     LIGATN/STRIP LONG & SHORT SAPHEN  1990+1995    both legs             Previous Endoscopy:     Results for orders placed or performed during the hospital encounter of 04/22/22   COLONOSCOPY   Result Value Ref Range    COLONOSCOPY       [facility]  Endoscopy Department-[place]  _______________________________________________________________________________  Patient Name: Demetria Goodrich          Procedure Date: 4/22/2022 10:49 AM  MRN: [id number]                       YOB: 1965  Admit Type: Outpatient                Age: 56  Gender: Female                        Note Status: Finalized  Attending MD: TYREL KRAUSE MD  Instrument Name: GIF-   [id number]  _______________________________________________________________________________     Procedure:                Colonoscopy  Indications:              Screening for colorectal malignant neoplasm  Providers:                TYREL KRAUSE MD  Referring MD:             KELLY SANCHEZ MD  Medicines:                Fentanyl 100 micrograms IV, Midazolam 4 mg IV  Complications:            No immediate complications.  _________________________________________________________ ______________________  Procedure:                Pre-Anesthesia Assessment:                            - Prior to the procedure, a History and Physical                             was performed, and patient medications and                             allergies were reviewed. The patient is competent.                             The risks and benefits of the procedure and the                             sedation options and risks were discussed with the                             patient. All questions were answered and informed                             consent was obtained. Patient identification and                             proposed procedure were verified by the physician                             and the nurse in the pre-procedure area in the                              endoscopy suite. Mental Status Examination: alert                             and oriented. Airway Examination: normal                             oropharyngeal airway and neck mobility and                              Mallampati Class II (the uvula but not tonsillar                             pillars visualized). Respiratory Examination: clear                             to auscultation. CV Examination: normal.                             Prophylactic Antibiotics: The patient does not                             require prophylactic antibiotics. Prior                             Anticoagulants: The patient has taken no                             anticoagulant or antiplatelet agents. ASA Grade                             Assessment: II - A patient with mild systemic                             disease. After reviewing the risks and benefits,                             the patient was deemed in satisfactory condition to                             undergo the procedure. The anesthesia plan was to                             use moderate sedation / analgesia (conscious                             sedation). Immediately prior to administration of                             medications, t he patient was re-assessed for                             adequacy to receive sedatives. The heart rate,                             respiratory rate, oxygen saturations, blood                             pressure, adequacy of pulmonary ventilation, and                             response to care were monitored throughout the                             procedure. The physical status of the patient was                             re-assessed after the procedure.                            After obtaining informed consent, the colonoscope                             was passed under direct vision. Throughout the                             procedure, the patient's blood pressure, pulse, and                              oxygen saturations were monitored continuously. The                             was introduced through the anus with the intention                             of advancing to the cecum. The scope was advanced                             to the rectum before the procedure  was aborted.                             Medications were given. The was introduced through                             the anus and advanced to the rectum to examine a                             mass. This was the intended extent. The colonoscopy                             was performed without difficulty. The patient                             tolerated the procedure well. The quality of the                             bowel preparation was evaluated using the BBPS                             ([place] Bowel Preparation Scale) with scores of:                             Left Colon = 3 (entire mucosa seen well with no                             residual staining, small fragments of stool or                             opaque liquid). The total BBPS score equals 3. The                             quality of the bowel preparation was good.                                                                                   Findings:       The perianal and digital rectal examinations wer e normal.       A 5 mm polyp was found at 5 cm proximal to the anus. The polyp was        sessile. The polyp was removed with a cold snare. Resection and        retrieval were complete. Verification of patient identification for the        specimen was done. Estimated blood loss was minimal.       A fungating, friable completely obstructing large mass was found in the        proximal rectum from approximaly 12cm proximal to the anal verge and        extending to at least 16cm proximalt to the anal verge which was the        maximal extent possible of the examination. The mass was circumferential        (involving 100% of the lumen circumference). In addition, its  luminal        diameter was reduced to approximately six mm. Neither the pediatric        colonoscope nor the adult gastroscope could navigate through this area        due to the tumor. Biopsies were taken with a cold forceps for histology.        Verification of patient identification for the specimen was done.        Estimat ed blood loss was minimal.       The retroflexed view of the distal rectum and anal verge was normal and        showed no additional anal or rectal abnormalities.                                                                                   Moderate Sedation:       Moderate (conscious) sedation was administered by the endoscopy nurse        and supervised by the endoscopist. The patient's oxygen saturation,        heart rate, blood pressure and response to care were monitored. Total        physician intraservice time was 25 minutes.  Impression:               - Likely malignant completely obstructing tumor in                             the proximal rectum extending from 12cm proximal to                             the anal verge to at least 16cm proximal to the                             anal verge at which point the lumen is sufficiently                             narrowed so that neither pediatric colonoscope, nor                             EGD scope could pass through. Biops ied.                            - One 5 mm polyp at 5 cm proximal to the anus,                             removed with a cold snare. Resected and retrieved.                            - The distal rectum and anal verge are normal on                             retroflexion view.  Recommendation:           - Discharge patient to home (ambulatory).                            - Patient has a contact number available for                             emergencies. The signs and symptoms of potential                             delayed complications were discussed with the                             patient. Return to  normal activities tomorrow.                             Written discharge instructions were provided to the                             patient.                            - Await pathology results.                            - Check CEA, CT Chest/ABD/Pevis with contrast for                             staging evaluation. Refer to medical oncology and                              colorectal surgery.                            - If surgery is pursued, another colonoscopy will                             be required in the future to clear the remainder of                             the colon as only the rectum was visualized today.                            - Mass was friable so passing of small blood clots                             in the post procedure period may be observed.                                                                                       _____________________________  TYREL KRAUSE MD  4/22/2022 11:47:22 AM  I was physically present for the entire viewing portion of the exam.TYREL KRAUSE MD  Number of Addenda: 0    Note Initiated On: 4/22/2022 10:49 AM  Scope In:  Scope Out:              Social History:   Reviewed and edited as appropriate  Social History     Socioeconomic History     Marital status: Single     Spouse name: Not on file     Number of children: Not on file     Years of education: Not on file     Highest education level: Not on file   Occupational History     Not on file   Tobacco Use     Smoking status: Current Every Day Smoker     Packs/day: 0.07     Years: 33.00     Pack years: 2.31     Types: Other, Cigarettes     Smokeless tobacco: Former User     Quit date: 3/13/2015     Tobacco comment: have Chantex prescription   Substance and Sexual Activity     Alcohol use: Yes     Comment: 12 pack per week     Drug use: No     Sexual activity: Yes     Partners: Male     Birth control/protection: None   Other Topics Concern     Parent/sibling w/ CABG, MI or  angioplasty before 65F 55M? No   Social History Narrative     Not on file     Social Determinants of Health     Financial Resource Strain: Not on file   Food Insecurity: Not on file   Transportation Needs: Not on file   Physical Activity: Not on file   Stress: Not on file   Social Connections: Not on file   Intimate Partner Violence: Not on file   Housing Stability: Not on file            Family History:   Reviewed and edited as appropriate  Family History   Problem Relation Age of Onset     Cancer Father         Brain tumor, age 32,  age 33     Other Cancer Father         Brain Cancer     Cancer - colorectal Mother         54     Other Cancer Mother         Blood Cancer     Leukemia Mother      Breast Cancer Mother      Colon Cancer Mother      Cancer - colorectal Maternal Uncle         56     Ovarian Cancer No family hx of            Allergies:   Reviewed and edited as appropriate     Allergies   Allergen Reactions     No Known Drug Allergies             Medications:     Current Facility-Administered Medications   Medication     [START ON 2022] ARIPiprazole (ABILIFY) tablet 10 mg     flumazenil (ROMAZICON) injection 0.2 mg     folic acid (FOLVITE) tablet 1 mg     lactated ringers BOLUS 1,000 mL     lactated ringers infusion     lidocaine (LMX4) cream     lidocaine (LMX4) cream     lidocaine 1 % 0.1-1 mL     lidocaine 1 % 0.1-5 mL     LORazepam (ATIVAN) tablet 1-2 mg     melatonin tablet 1 mg     multivitamin w/minerals (THERA-VIT-M) tablet 1 tablet     octreotide (sandoSTATIN) 1,250 mcg in D5W 250 mL     pantoprazole (PROTONIX) IV push injection 40 mg     piperacillin-tazobactam (ZOSYN) 4.5 g vial to attach to  mL bag     sodium chloride (PF) 0.9% PF flush 10-40 mL     sodium chloride (PF) 0.9% PF flush 3 mL     sodium chloride (PF) 0.9% PF flush 3 mL     thiamine (B-1) tablet 100 mg     vancomycin (VANCOCIN) 1,750 mg in sodium chloride 0.9 % 500 mL intermittent infusion     [START ON 2022]  "vancomycin 1500 mg in 0.9% NaCl 250 ml intermittent infusion 1,500 mg     Current Outpatient Medications   Medication Sig     ARIPiprazole (ABILIFY) 5 MG tablet Take 1 tablet by mouth daily     naproxen sodium (ANAPROX) 220 MG tablet Take 1 tablet (220 mg) by mouth 2 times daily (with meals)     simvastatin (ZOCOR) 40 MG tablet Take 1 tablet (40 mg) by mouth At Bedtime     triamcinolone (KENALOG) 0.5 % external ointment APPLY 1 GRAM TOPICALLY TO THE AFFECTED AREA TWICE DAILY (Patient not taking: Reported on 5/5/2022)             Review of Systems:     A complete review of systems was performed and is negative except as noted in the HPI           Physical Exam:   /64   Pulse 102   Temp 97.8  F (36.6  C) (Oral)   Resp 13   Ht 1.651 m (5' 5\")   Wt 69.9 kg (154 lb)   LMP 11/30/2012   SpO2 100%   BMI 25.63 kg/m    Wt:   Wt Readings from Last 2 Encounters:   05/05/22 69.9 kg (154 lb)   05/05/22 70 kg (154 lb 6.4 oz)      Constitutional: cooperative, pleasant, not dyspneic/diaphoretic, no acute distress  Eyes: Sclera anicteric/injected  Ears/nose/mouth/throat: Normal oropharynx without ulcers or exudate, mucus membranes moist, hearing intact  Neck: supple, thyroid normal size  CV: No edema  Respiratory: Unlabored breathing  Lymph: No axillary, submandibular, supraclavicular or inguinal lymphadenopathy  Abd: Nondistended, +bs, no hepatosplenomegaly, nontender, no peritoneal signs  Skin: warm, perfused, no jaundice  Neuro: AAO x 3, No asterixis  Psych: Normal affect  MSK: Normal gait         Data:   Labs and imaging below were independently reviewed and interpreted    BMP  Recent Labs   Lab 05/05/22  1155 05/05/22  1001 05/02/22  0956    134  --    POTASSIUM 3.5 3.1*  --    CHLORIDE 94 94  --    CONNIE 10.9* 10.4*  --    CO2 24 23  --    BUN 25 24  --    CR 1.12* 1.03 1.2*   * 101*  --      CBC  Recent Labs   Lab 05/05/22  1155 05/05/22  1001   WBC 27.3* 28.1*   RBC 2.04* 1.73*   HGB 5.8*  5.8* 4.8* "   HCT 20.0* 17.1*   MCV 98 99   MCH 27.9 27.7   MCHC 29.0* 28.1*   RDW 25.4* 25.5*    318     INR  Recent Labs   Lab 05/05/22  1155   INR 1.52*     LFTs  Recent Labs   Lab 05/05/22  1155 05/05/22  1001   ALKPHOS 1,475* 1,407*   * 388*   ALT 62* 58*   BILITOTAL 0.9 0.9   PROTTOTAL 7.1 6.5*   ALBUMIN 1.9* 1.8*      PANCNo lab results found in last 7 days.    Imaging:  CT abdomen pelvis 5/2/22      IMPRESSION:   1. The liver is enlarged by innumerable hepatic metastases. Prominent  and mildly enlarged heterogeneous peg hepatis lymph nodes are  suspicious for metastatic disease as well.  2. Multiple bilateral metastatic pulmonary nodules measuring up to 9  mm.   3. Please refer to same day MRI for further details regarding the  patient's known rectal mass.  4. Small volume ascites.     ADONAY BUCKNER, DO     Colonoscopy 4/22/2022  Findings:       The perianal and digital rectal examinations wer e normal.       A 5 mm polyp was found at 5 cm proximal to the anus. The polyp was        sessile. The polyp was removed with a cold snare. Resection and        retrieval were complete. Verification of patient identification for the        specimen was done. Estimated blood loss was minimal.       A fungating, friable completely obstructing large mass was found in the        proximal rectum from approximaly 12cm proximal to the anal verge and        extending to at least 16cm proximalt to the anal verge which was the        maximal extent possible of the examination. The mass was circumferential        (involving 100% of the lumen circumference). In addition, its luminal        diameter was reduced to approximately six mm. Neither the pediatric        colonoscope nor the adult gastroscope could navigate through this area        due to the tumor. Biopsies were taken with a cold forceps for histology.        Verification of patient identification for the specimen was done.        Estimat ed blood loss was minimal.        The retroflexed view of the distal rectum and anal verge was normal and        showed no additional anal or rectal abnormalities.                                                                                   Moderate Sedation:       Moderate (conscious) sedation was administered by the endoscopy nurse        and supervised by the endoscopist. The patient's oxygen saturation,        heart rate, blood pressure and response to care were monitored. Total        physician intraservice time was 25 minutes.  Impression:               - Likely malignant completely obstructing tumor in                             the proximal rectum extending from 12cm proximal to                             the anal verge to at least 16cm proximal to the                             anal verge at which point the lumen is sufficiently                             narrowed so that neither pediatric colonoscope, nor                             EGD scope could pass through. Biops ied.                            - One 5 mm polyp at 5 cm proximal to the anus,                             removed with a cold snare. Resected and retrieved.                            - The distal rectum and anal verge are normal on                             retroflexion view.  Recommendation:           - Discharge patient to home (ambulatory).                            - Patient has a contact number available for                             emergencies. The signs and symptoms of potential                             delayed complications were discussed with the                             patient. Return to normal activities tomorrow.                             Written discharge instructions were provided to the                             patient.                            - Await pathology results.                            - Check CEA, CT Chest/ABD/Pevis with contrast for                             staging evaluation. Refer to medical oncology and                               colorectal surgery.                            - If surgery is pursued, another colonoscopy will                             be required in the future to clear the remainder of                             the colon as only the rectum was visualized today.                            - Mass was friable so passing of small blood clots                             in the post procedure period may be observed.

## 2022-05-06 PROBLEM — N17.0 ACUTE KIDNEY FAILURE WITH TUBULAR NECROSIS (H): Status: ACTIVE | Noted: 2022-01-01

## 2022-05-06 NOTE — PROGRESS NOTES
GASTROENTEROLOGY PROGRESS NOTE          ASSESSMENT AND RECOMMENDATIONS:   Assessment:  Demetria Goodrich is a 57 year old female with a history of T3d rectal adenocarcinoma with metastasis to the liver, anemia, tobacco presents from clinic with hgb 4.9 (though 5.9 here) versus hgb 8.4 on 4/1. GI consulted for acute anemia.      Acute (presumed blood loss) anemia  Dark stools  Rectal adenocarcinoma w liver metastasis   Hypotension and tachycardia improving with resuscitation (s/p 4 unit(s) PRBC). Lactic acidosis improving as well.  BUN/creat < 30. ABRAHAN this am without blood and hemodynamically stable. No currently active bleeding. She has risk factors for ugi bleeding (etoh, NSAIDs) but no melena/hematemesis here. No evidence of varices on CT. CT with hyperdense material both sides of rectal mass, likely tumor bleeding. Appears stopped at present so colonoscopy of low yield (can only access underside of mass and provide temporizing intervention such as hemo spray for tumor bleeding).     Elevated transaminases  ALP 1475,  on admission >> ,  this am. T bili 0.9 >> 2.7. Improving transaminases with subsequent bump in bilirubin compatible with transient ischemic injury in setting of septic vs hypovolemic shock.  -Continue to monitor LFTs to resolution     Recommendations  -Low fiber diet/would defer to CRS if other thoughts on diet  -Miralax BID, titrate up, to maintain semi formed stools more likely to pass obstructive rectal mass  -Regarding anemia   -Continue PPI BID IV here   -Transfuse hgb < 7 from GI standpoint   -Continue to monitor hemodynamics and stools for signs of active bleeding   -No plan for egd/colonoscopy at this time. Will reevaluate with overt bleeding or transfusion refractory anemia  -Continue to trend bilirubin and LFTs  -Ok to discontinue octreotide from GI standpoint     GI will continue to follow    Thank you for involving us in this patient's care. Please do not hesitate to  "contact the GI service with any questions or concerns.     Pt care plan discussed with Dr. Alvarenga, GI staff physician.    Nolan Hernandez CNP  [facility]  Text page          Interval History:   Hypotension and tachycardia improved with IVF and 4 unit(s) prbc. Hgb 5.8 >> 8.4. No overt blood out. Bm this am brown per nursing. Denies emesis. Endorses 2-4 tabs of Aleve daily. No PPI. Daily etoh use.          Medications:     Current Facility-Administered Medications   Medication     ARIPiprazole (ABILIFY) tablet 10 mg     glucose gel 15-30 g    Or     dextrose 50 % injection 25-50 mL    Or     glucagon injection 1 mg     flumazenil (ROMAZICON) injection 0.2 mg     folic acid (FOLVITE) tablet 1 mg     heparin lock flush 10 UNIT/ML injection 5-20 mL     heparin lock flush 10 UNIT/ML injection 5-20 mL     lactated ringers infusion     lidocaine (LMX4) cream     lidocaine (LMX4) cream     lidocaine 1 % 0.1-1 mL     lidocaine 1 % 0.1-5 mL     LORazepam (ATIVAN) tablet 1-2 mg     melatonin tablet 1 mg     multivitamin w/minerals (THERA-VIT-M) tablet 1 tablet     octreotide (sandoSTATIN) 1,250 mcg in D5W 250 mL     pantoprazole (PROTONIX) IV push injection 40 mg     phytonadione 10 mg in sodium chloride 0.9 % 50 mL intermittent infusion     piperacillin-tazobactam (ZOSYN) 4.5 g vial to attach to  mL bag     sodium chloride (PF) 0.9% PF flush 10-40 mL     sodium chloride (PF) 0.9% PF flush 3 mL     sodium chloride (PF) 0.9% PF flush 3 mL     thiamine (B-1) tablet 100 mg     vancomycin 1500 mg in 0.9% NaCl 250 ml intermittent infusion 1,500 mg        Physical Exam   Blood pressure 111/65, pulse 79, temperature 97.8  F (36.6  C), temperature source Oral, resp. rate (!) 31, height 1.651 m (5' 5\"), weight 70 kg (154 lb 5.2 oz), last menstrual period 11/30/2012, SpO2 97 %.  Constitutional: cooperative, pleasant, not dyspneic/diaphoretic, no acute distress  Eyes: Sclera anicteric/injected  Ears/nose/mouth/throat: Normal oropharynx " without ulcers or exudate, mucus membranes moist  Neck: supple  CV: No edema  Respiratory: Unlabored breathing  Abd: Nondistended, +bs, no hepatosplenomegaly, nontender, no peritoneal signs  Skin: warm, perfused, no jaundice  Neuro: AAO x 3  Psych: Normal affect  MSK: No gross deformities  ABRAHAN: Large hemorrhoids, no fissure, mass on ABRAHAN, scant formed dark brown stool. Chaperone present.    Data   Current Labs  CBC  Recent Labs   Lab 05/06/22  0713 05/06/22  0333 05/06/22  0138 05/05/22  1715 05/05/22  1155 05/05/22  1001   WBC 24.8* 21.9*  --   --  27.3* 28.1*   RBC 2.98* 2.46*  --   --  2.04* 1.73*   HGB 8.4* 6.8* 7.1* 5.4* 5.8*  5.8* 4.8*   HCT 26.5* 22.0* 22.9*  --  20.0* 17.1*   MCV 89 89  --   --  98 99   MCH 28.2 27.6  --   --  27.9 27.7   MCHC 31.7 30.9*  --   --  29.0* 28.1*   RDW 22.7* 23.0*  --   --  25.4* 25.5*    218  --   --  313 318     BMP  Recent Labs   Lab 05/06/22  0721 05/06/22  0713 05/05/22  1836 05/05/22  1155 05/05/22  1001   NA  --  138 135 136 134   POTASSIUM  --  3.4 3.9 3.5 3.1*   CHLORIDE  --  100 95 94 94   CO2  --  27 26 24 23   ANIONGAP  --  11 14 18* 17*   * 109* 81 102* 101*   BUN  --  30 30 25 24   CR  --  1.57* 1.28* 1.12* 1.03   GFRESTIMATED  --  39* 49* 58* 64   CONNIE  --  9.0 9.5 10.9* 10.4*   MAG  --   --   --  2.0  --    PHOS  --   --  4.2 3.3  --       INR  Recent Labs   Lab 05/06/22  0624 05/05/22  1848 05/05/22  1155   INR 1.41* 1.65* 1.52*     Liver panel  Recent Labs   Lab 05/06/22  0713 05/05/22  1155 05/05/22  1001   PROTTOTAL 6.2* 7.1 6.5*   ALBUMIN 2.2* 1.9* 1.8*   BILITOTAL 2.7* 0.9 0.9   ALKPHOS 976* 1,475* 1,407*   * 404* 388*   ALT 46 62* 58*       Imaging/procedures:  Reviewed in EMR    5/5/2022 CT ab pelvis    IMPRESSION:  1.  No GI bleed identified. No acute vascular abnormality evident.  2.  Bulky innumerable hepatic metastases. Tiny volume of ascites.  3.  Tiny indeterminate pulmonary nodules favored to be metastatic.  4.  Region of  circumferential wall thickening at rectosigmoid junction favored to represent primary malignancy given history.  5.  Mid/distal moderate grade small bowel obstruction with the transition point occurring at a group of bowel loops appearing somewhat tethered in the pelvis, very near the colonic malignancy site.

## 2022-05-06 NOTE — PROGRESS NOTES
1900  Pt report received pt is presently getting a PICC line in ED 16 and will be moved to ED 2 after PICC placed    1930  Pt moved to ED 2, pt is alert, lethargic pt has blood LR and Octreotide running  2000  Vanco started  2030 second unit PRBCs started  2100  To CT abdomen with contrast  2200  LR bolus 500 ml, pressures are soft with map 66-74  2300  3rd unit PRBCs started  0000  0030  bp 97/74

## 2022-05-06 NOTE — PROGRESS NOTES
MEDICAL ICU PROGRESS NOTE  05/06/2022      Date of Service (when I saw the patient): 05/06/2022    ASSESSMENT:   Demetria Goodrich is a 57 year old female with PMH of alcohol use, HLD, and rectal cancer. who was admitted on 5/5/2022 for acute blood loss anemia c/f GIB.    CHANGES and MAJOR THINGS TODAY:   - Transfer to the floor  - Continue with workup/management of new rectal cancer per consulting teams      PLAN:  Neuro:  # Alcohol Use Disorder   Daily drinker. BAL 0.05.   - On CIWA, not scoring  - Folate, thiamine      # Bipolar disorder:   - Continue PTA Abilify 10 mg daily     Pulmonary:  # Hx of SHARMA  No oxygen requirement, likely, 2/2 symptomatic anemia.     Cardiovascular:  # Hypotension   # Lactic Acidosis   Initially normotensive, but had low BPs overnight 5/5-5/6. Transferred to the ICU with concern that she would need pressors; however, her blood pressure improved throughout the night and pressors were never needed. Hemoglobin improved after transfusions, suspect this was related to her anemia.     # Tachycardia, resolved  In setting of anemia.     # Hx of HLD  - Hold PTA statin      GI/Nutrition:  # Acute on chronic anemia  # Recurrent melena and recent ??hematemesis  Patient reporting several months of dark stools and there were reports of one episode of possible hematemesis (though she is adamant is was cranberry juice). Hgb at PCP on the day of admission was 4.9, repeat Hgb 5.8 when she got to the hospital. Improved to 8.4 after transfusions.  - GI following  - octreotide gtt, IV BID PPI  - On MIVF  - Trend Hgb Q4H   - Transfuse for Hgb <7  - 2 PIV (large bore if possible)      # Mid/distal moderate grade SBO   Transition point at group of bowel loops near malignancy site. Having small BMs and not having pain this morning.  - Colorectal following  - ADAT     # Abnormal LFTs  # Coagulapathy   Up trending since 4/2022. Likely 2/2 known liver metastasis. Alcohol use could also be contributing.   -  Trend LFTs     # Probable severe malnutrition:    Poor intake over the past few months with apparent 30 lb wt loss.   - Nutrition consulted     Renal/Fluids/Electrolytes:  # Possible UTI  Increased urinary frequency. UA with trace LE, few bac and 5-10 WBCs. UC pending.   - Zosyn   - Follow Urine cultures      # SERENITY  Likely pre-renal vs ATN in setting of severe anemia.  - Trend  - Straight cath prn     # AGMA  # Lactic Acidosis  Likely 2/2 shock from GIB as well as metastatic cancer.  - Trend lactate  - IVF/blood as needed     Endocrine:  # No acute issues.     ID:  # Leukocytosis  Patient afebrile. Likely stress demargination vs infection of unknown source. Antibiotics for now, de-escalate if everything negative in next 48 hours.    - BCx2, UA/UC  - Abx: Zosyn + Vanc     Hematology:    # Metastatic rectal adenocarcinoma, T3d  Patient reporting 30 lb wt loss, lack of appetite, weakness and SHARMA. Also with recurrent melena. Underwent colonoscopy this past April that revealed large rectal mass with pathology + for rectal adenocarcinoma. Staging MRI 5/2 that revealed cancer T3d.   - Oncology, colorectal consulted  - Consider Palliative consult      # Coagulopathy  Liver dysfunction.     Musculoskeletal/Skin:  # BLE edema:  US negative for DVT. Likely venous insufficiency vs third spacing.   - PCDs  - TTE       General Cares/Prophylaxis:    DVT Prophylaxis: None   GI Prophylaxis: PPI  Restraints: None  Family Communication: None, plan to visit in AM  Code Status: Full     Lines/tubes/drains:  - PICC, PIVx2    Disposition:  - Transfer to the floor    Patient seen and findings/plan discussed with medical ICU staff, Dr. Schafer.    Amina Murray MD  Internal Medicine, PGY-3      ====================================  INTERVAL HISTORY:   Patient's BPs stabilized throughout the night, never required pressors. Feeling well this morning. Ambulated around unit, voided in bedside commode. No nausea, no vomiting, no abdominal  pain.    OBJECTIVE:   1. VITAL SIGNS:   Temp:  [97.2  F (36.2  C)-98.3  F (36.8  C)] 97.8  F (36.6  C)  Pulse:  [0-108] 79  Resp:  [7-31] 31  BP: ()/(51-75) 111/65  SpO2:  [94 %-100 %] 97 %  Resp: (!) 31    2. INTAKE/ OUTPUT:   I/O last 3 completed shifts:  In: 3480 [I.V.:1000; IV Piggyback:500]  Out: 420 [Urine:420]    PHYSICAL EXAMINATION:  General: NAD, sitting up in bed  HEENT: NCAT, non-icteric sclera   Neuro: A&Ox3, NAD  Pulm/Resp: Bilateral crackles on bases, non-labored breathing  CV: RRR  Abdomen: Firm masses palpable, non-tender  MSK: 3+ pitting edema up to knee  : No zuleta  Incisions/Skin: No rash    LABS:   Arterial Blood Gases   No lab results found in last 7 days.  Complete Blood Count   Recent Labs   Lab 05/06/22  0713 05/06/22  0333 05/06/22  0138 05/05/22  1715 05/05/22  1155 05/05/22  1001   WBC 24.8* 21.9*  --   --  27.3* 28.1*   HGB 8.4* 6.8* 7.1* 5.4* 5.8*  5.8* 4.8*    218  --   --  313 318     Basic Metabolic Panel  Recent Labs   Lab 05/06/22  0721 05/06/22  0713 05/05/22  1836 05/05/22  1155 05/05/22  1001   NA  --  138 135 136 134   POTASSIUM  --  3.4 3.9 3.5 3.1*   CHLORIDE  --  100 95 94 94   CO2  --  27 26 24 23   BUN  --  30 30 25 24   CR  --  1.57* 1.28* 1.12* 1.03   * 109* 81 102* 101*     Liver Function Tests  Recent Labs   Lab 05/06/22  0713 05/06/22  0624 05/05/22  1848 05/05/22  1155 05/05/22  1001   *  --   --  404* 388*   ALT 46  --   --  62* 58*   ALKPHOS 976*  --   --  1,475* 1,407*   BILITOTAL 2.7*  --   --  0.9 0.9   ALBUMIN 2.2*  --   --  1.9* 1.8*   INR  --  1.41* 1.65* 1.52*  --      Coagulation Profile  Recent Labs   Lab 05/06/22  0624 05/05/22  1848 05/05/22  1155   INR 1.41* 1.65* 1.52*   PTT  --  37 33       RADIOLOGY:   Recent Results (from the past 24 hour(s))   US Lower Extremity Venous Bilateral Port    Narrative    EXAMINATION: DOPPLER VENOUS ULTRASOUND OF BILATERAL LOWER EXTREMITIES,  5/5/2022 3:18 PM     COMPARISON:  None.    HISTORY: Edema    TECHNIQUE:  Gray-scale evaluation with compression, spectral flow and  color Doppler assessment of the deep venous system of both legs from  groin to knee, and then at the ankles.    FINDINGS:  Right: the common femoral, femoral, popliteal and posterior tibial  veins demonstrate normal compressibility and blood flow.    Left: the common femoral, femoral, popliteal and posterior tibial  veins demonstrate normal compressibility and blood flow.      Impression    IMPRESSION:  1.  No evidence of deep venous thrombosis in either lower extremity.    DEBRA ROBIN MD         SYSTEM ID:  [id number]   CTA Abdomen Pelvis with Contrast    Narrative    EXAM: CTA ABDOMEN PELVIS WITH CONTRAST  LOCATION: [facility]  DATE/TIME: 5/5/2022 9:30 PM    INDICATION: GI bleed. History of advanced, metastatic rectal adenocarcinoma. Anemia.  COMPARISON: None.  TECHNIQUE: CT angiogram abdomen pelvis during arterial phase of injection of IV contrast. 2D and 3D MIP reconstructions were performed by the CT technologist. Dose reduction techniques were used.  CONTRAST: Isovue 370 100    FINDINGS:  ANGIOGRAM ABDOMEN/PELVIS: Normal caliber abdominal aorta, no aortic dissection. Moderately heavy atherosclerotic plaque present diffusely. The splanchnic, renal and iliac arteries are patent. No arterial occlusive changes are seen.  No focal area of bowel hyperemia or GI GI tract extravasation is identified. There is pre-existing high density material within left hemicolon and rectum which remains unchanged on all phases of this exam    LOWER CHEST: 7 mm pulmonary nodule posterior right base abutting diaphragm. There are 2 separate 3 mm nodules within inferior right middle lobe as well as inferior medial most aspect of left lower lobe. All of these are indeterminate.    HEPATOBILIARY: Innumerable diffuse hepatic metastases involving all aspects of liver. Largest discrete easily  measurable nodules within lateral segment left lobe measuring 4 cm (series 13 image 69). There are conglomerate masses within right lobe that   are substantially larger up to 10 cm but more difficult to accurately measure.  Trace volume ascites present.    PANCREAS: Normal.    SPLEEN: Normal.    ADRENAL GLANDS: Normal.    KIDNEYS/BLADDER: 5 mm stone lower pole right kidney, no hydronephrosis.  Left kidney demonstrates approximately 4 nonobstructing stones, largest measures 4 mm at mid pole. No suspicious renal lesions.    BOWEL: Moderate circumferential wall thickening involving the rectosigmoid junction without well-defined mass. There is no colonic obstruction.  There is a mild amount of high density intraluminal material within rectum and sigmoid colon both upstream and downstream from the site of malignancy. No obvious superimposed bowel hyperemia or blood loss is seen though subtle blood loss would be   obscured by the pre-existing density.    There is, however, a mid/distal small bowel obstruction with a group of bowel loops in the pelvis that appears somewhat tethered including a loop that demonstrates fecalization of bowel contents. A nondilated distal loop of small bowel appears to merge   from this tethered region. The tethering is near the site of the presumed colonic malignancy.    LYMPH NODES: A few small upper retroperitoneal lymph nodes are seen, largest is a aortocaval node measuring 1.9 x 0.9 cm (series 13 image 197) and 1.5 cm node image 155.    PELVIC ORGANS: No adnexal lesions. No free fluid.    MUSCULOSKELETAL: No suspicious bony lesions.      Impression    IMPRESSION:  1.  No GI bleed identified. No acute vascular abnormality evident.  2.  Bulky innumerable hepatic metastases. Tiny volume of ascites.  3.  Tiny indeterminate pulmonary nodules favored to be metastatic.  4.  Region of circumferential wall thickening at rectosigmoid junction favored to represent primary malignancy given  history.  5.  Mid/distal moderate grade small bowel obstruction with the transition point occurring at a group of bowel loops appearing somewhat tethered in the pelvis, very near the colonic malignancy site.   XR Chest Port 1 View    Narrative    Exam: XR CHEST PORT 1 VIEW, 2022 7:48 AM    Indication: Assess for pulmonary edema    Comparison: CT 2022    Findings:   Portable semiupright AP view of the chest. Right upper extremity PICC  with tip near the cavoatrial junction.    Trachea is midline. Heart size is normal. Low inspiratory volumes.  Mild diffuse interstitial opacities. Few streaky bibasilar and  retrocardiac opacities. No focal consolidation, appreciable pleural  effusion or pneumothorax. No acute osseous abnormality.      Impression    Impression: Findings which may represent mild pulmonary edema and  bibasilar atelectasis. No focal consolidation or appreciable pleural  effusion.    I have personally reviewed the examination and initial interpretation  and I agree with the findings.    MONALISA HENLEY MD         SYSTEM ID:  [id number]   Echo Complete   Result Value    LVEF  60-65%    Narrative    [id number]  [id number]  [id number]  [id number]^LYNDSEY^ELIJAH^SHANNON     [facility]  Echocardiography Laboratory  [address] [place], [place] [address]     Name: REN NAGEL  MRN: [id number]  : 1965  Study Date: 2022 07:22 AM  Age: 57 yrs  Gender: Female  Patient Location: [facility]  Reason For Study: SHARMA  Ordering Physician: ELIJAH SOLORIO  Performed By: [facility] Lauren Marcelo     BSA: 1.8 m2  Height: 65 in  Weight: 154 lb  BP: 124/69 mmHg  ______________________________________________________________________________  Procedure  Complete Portable Echo Adult.  ______________________________________________________________________________  Interpretation Summary  Global and regional left ventricular function is normal with an EF of 60-65%.  Left ventricular  diastolic function is normal.  Global right ventricular function is normal.  The inferior vena cava was normal in size with preserved respiratory  variability.  No significant valvular abnormalities present.     Previous study not available for comparison.  ______________________________________________________________________________  Left Ventricle  Left ventricular size is normal. Left ventricular wall thickness is normal.  Global and regional left ventricular function is normal with an EF of 60-65%.  Left ventricular diastolic function is normal.     Right Ventricle  The right ventricle is normal size. Global right ventricular function is  normal.     Atria  Both atria appear normal.     Mitral Valve  The mitral valve is normal.     Aortic Valve  Aortic valve is normal in structure and function.     Tricuspid Valve  The tricuspid valve is normal.     Pulmonic Valve  The pulmonic valve is normal.     Vessels  The aorta root is normal. The inferior vena cava was normal in size with  preserved respiratory variability.     Pericardium  No pericardial effusion is present.     Miscellaneous  No significant valvular abnormalities present.     Compared to Previous Study  Previous study not available for comparison.  ______________________________________________________________________________  MMode/2D Measurements & Calculations  IVSd: 0.96 cm  LVIDd: 5.1 cm  LVIDs: 3.4 cm  LVPWd: 0.90 cm  FS: 33.8 %  LV mass(C)d: 170.2 grams  LV mass(C)dI: 96.1 grams/m2  Ao root diam: 3.1 cm  asc Aorta Diam: 3.5 cm  LVOT diam: 2.1 cm  LVOT area: 3.5 cm2  LA Volume (BP): 40.7 ml     LA Volume Index (BP): 23.0 ml/m2  RWT: 0.35     Doppler Measurements & Calculations  MV E max jocelyn: 90.7 cm/sec  MV A max jocelyn: 64.0 cm/sec  MV E/A: 1.4  MV dec slope: 493.0 cm/sec2  PA V2 max: 81.4 cm/sec  PA max P.7 mmHg  PA acc time: 0.15 sec  TR max jocelyn: 191.0 cm/sec  TR max P.6 mmHg  E/E' avg: 10.4  Lateral E/e': 10.2  Medial E/e': 10.7      ______________________________________________________________________________  Report approved by: Candice AGUERO 05/06/2022 10:06 AM

## 2022-05-06 NOTE — PROGRESS NOTES
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 Deemtria Goodrich is a 57 year old woman.    Previously quite healthy.   No major physical medical conditions.    Since Jan 2022, more fatigue and 30-40 lb weight loss.  Went from ECOG 0-- fully functional, maybe ECOG 2  Minimal appetite  Smaller stool caliber-- maybe some dark stools    Workup revealed a de-frida advanced/ metastatic rectal adenocarcinoma (moderately differentiated, mismatch repair intact) with mets to liver and likely lungs.  She was due to meet with Dr. Nelson and Dr. Pollack of surg onc and med onc this week, but came to ED with severe anemia after coming to PCP with pedal edema.    Workup so far:  Colonoscopy with Dr. Everette Fischer on 4/22/2022: very obstructive tumor in proximal rectum-- biopsied, could not pass through    CEA 3,800    CT CAP and MR rectum on 5/2/2022  Multiple liver and sub-cm lung lesions  Upper rectal T3Nx tumor    In ED, on 5/5/2022  WBC 28, 80% neutrophil  Hb 4.8  Albumin 1.9  Cr 1  Lactate 10  ALT/ AST 60/ 600  Bili 0.9  DVT US neg    Transferred to ICU-- fluids/ broad spectrum abx, PRBC  CTA abdomen with no bleeding-- SBO developing    Interval history:  Sitting up in bed today  Better spirits  Asking when she can go home    Review of Systems: 14 point ROS negative other than the symptoms noted above in the HPI.    Past medical history:  Hypercholesterolemia, bipolar 1, nicotine and alcohol use, varicose veins    Past surgical  "history:  Vein stripping    Social history:   Lives with boyfriend, Dagoberto, of 22 years, In [place]. Dagoberto cell: [phone].  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      Family history:  Father--  in his 40s from brain tumor  Mother- leukemia  2 siblings-- colon polyps?  No biological children    Allergies:   NKDA    Outpatient medications:   Statin  Aripirazole    Physical examination:  Vital signs: /65   Pulse 79   Temp 97.8  F (36.6  C) (Oral)   Resp (!) 31   Ht 1.651 m (5' 5\")   Wt 70 kg (154 lb 5.2 oz)   LMP 2012   SpO2 97%   BMI 25.68 kg/m      ECOG performance status:  Currently 3  Vascular access: Multiple PIV    GENERAL: Tired woman, in bed  HEENT: pallor  NECK: No JVD/LAD.  LUNGS: Normal work of breathing.   CARDIOVASCULAR: tachycardic  ABDOMEN: Soft, nontender  EXTREMITIES: No cyanosis, some edema  NEUROLOGIC: No focal deficits  LYMPH NODE EXAM: No palpable adenopathy.      Lab data:  Hb coming up  Cr batsheva some  Bili 2    Radiology data:  I have personally reviewed the images of / or the following radiology data:    CT CAP and MR rectum on 2022  Multiple liver and sub-cm lung lesions  Upper rectal T3Nx tumor    Pathology and other data:  I have personally reviewed and interpreted the following data:  Rectal biopsy with moderately differentiated adenocarcinoma      Assessment and Plan:  Ms. Demetria Goodrich is a 57 year old woman.  Previously healthy.  In late 2022, diagnosed with de-frida advanced/ metastatic upper rectal adenocarcinoma (moderately differentiated, mismatch repair intact) with mets to liver and likely lungs.    CEA very high to >3000.  Primary tumor is quite obstructive-- I am (happily) surprised she is still having (very small) BMs  Liver mets are quite diffuse-- ALT up to 400, bili " on admisison was 0.9.  Acute? On chronic blood loss anemia with very high lactate and leukocytosis-- latter 2 could be from anemic stress    This is a difficult situation.   She needs blood and resuscitation.  Rule out infection with blood cultures, and empiric abx.    As her Hb comes up with aggressive resuscitation and as GI decides if/when to do an upper GI scope (could also have gastritis etc from alcohol/ smoking), we will need to make some cancer-specific decisions.    I am glad CRC surgery is already on board.    Resecting primary tumor with metastatic disease is not standard of care-- only done if primary tumor is causing local issues-- bleed, obstruction, perforation etc. Let us see if this tumor qualifies for that.    More concerningly, she has an impressive burden of liver disease which is quite diffuse and impressive-- primary tumor resection will not address this. For this, we need chemo. In fact, resecting primary will make us wait a while before we can give chemo-- so this may favor a chemo first approach.  If we do do chemo, we prefer to do outpatient and patients to be fit and functional. I am not sure though, if and how fast this will happen. Although I am encouraged that most of her current condition is tumor (and more acutely, anemia) related. Albumin and nutrition also quite poor. In some situations, we do inpatient chemo. Given time taken outpatient to set up port/ chemo, and her LFTs and liver burden of disease, could consider chemo next week inpatient if she does better.    Her tumor Is mismatch repair proficient. We can send other molecular studies (will take 2-3 weeks) but if choose systemic therapy, could go with something like FOLFOX to try and get response. FOLFOXIRI we need to be more careful with liver, but maybe we will need to do that given that our only hope is we get response.        Will discuss at CRC tumor board on 5/9, will also discuss with CRC surgery team.  Resuscitation  with PRBC and fluid, consider upper GI scope, cultures/ antibiotics.  Also concern for developing bowel obstruction-- but also acutely ill.   Bili also up to-- but acute sickness.  We will follow closely.  We will send NGS on primary tumor.        Cl Saunders M.D.   of Medicine  Division of Hematology, Oncology and Transplantation  [facility]

## 2022-05-06 NOTE — CONSULTS
Department of Radiation Oncology  [place] [address]  [address] [place] [address]  Office: [phone]  Fax: [phone]    Radiation Oncology Clinic  [address] [place] [address]  Phone: [phone]  Fax: [phone]    Patient:  Demetria Goodrich  MRN:   [id number]  :  1965    Radiation Oncology Consult Note  Date:  May 5, 2022    Chief Complaint:    Obstructive symptoms and bleeding related to rectal adenocarcinoma    History of Present Illness:    Ms. Goodrich is a 58yo lady with upper rectal adenocarcinoma (moderately differentiated, mismatch repair intact) with metastases to liver and lungs who we are asked to see for possible palliative radiotherapy.    She has a long history of iron deficiency anemia. She noted unintentional weight loss 25-30 pounds and melena since around . She underwent screening colonoscopy (22) which showed a completely obstructing tumor of the proximal rectum extending from 12cm proximal of the anal verge to 16cm proximal of the anal verge.    Pathology of rectal mass (22, [id number]) showed adenocarcinoma, moderately differentiated, with intact mismatch repair proteins.    MR rectum (22) showed polypoid tumor in the upper rectum extending into the perirectal fat, stage T3d, with possible confluent lymph nodes in the presacral space along the mesenteric veins and enlarged heterogeneous liver compatible with diffuse metastatic disease.    CT chest, abdomen and pelvis (22) showed innumerable hepatic metastases, prominent and mildly enlarged heterogeneous peg hepatis lymph nodes, multiple bilateral metastatic pulmonary nodules up to 9mm and small volume ascites.    She saw PCP (22) with complaint of bilateral lower extremity edema and dyspnea on exertion. Preliminary evaluation was significant for Hg 4.9. She was directed to the ER for evaluation. She was admitted to internal medicine. On admission, repeat Hg was 5.8  and lactic acid was elevated to 10.1. She was started on octreotide drip and PPI IV BID. 1 unit pRBC was ordered. GI was consulted and determined there was no evidence of overt GI bleeding which warranted endoscopy at this time. Oncology Dr. Saunders who recommended chemotherapy with FOLFOX or FOLFOXIRI, ideally as an outpatient once she stabilized.     Her repeat Hg and lactic acid remained about the same after 1 unit pRBC. Her blood pressures were low with MAP 60. She was transferred to ICU for close monitoring of low BP. ICU administered blood and fluids which improved lactate and blood pressure. She was started on antibiotics for possible sepsis.     Colorectal surgery was consulted and determined that she was a poor surgical candidate. Thus, oncology recommended radiation oncology consultation for possible palliative radiotherapy to alleviate bleeding and obstruction.    Today Ms. Goodrich is doing well. She has no hematemesis or hematochezia. She has no fatigue or dyspnea. She reports feeling at her baseline.        Past Medical History:    Past Medical History:   Diagnosis Date     ASCUS on Pap smear 2010     Cervical high risk HPV (human papillomavirus) test positive 4/1/2022 2005, 2010 ASCUS, neg HR HPV 2008, 2011, 2012 NIL paps 3/13/18 NIL pap, neg HPV 4/1/22 NIL pap, +HR HPV (not 16/18). Plan: cotest in 1 year     Chronic hypomanic disorder (H)      Other and unspecified alcohol dependence, unspecified drinking behavior      Rectal cancer (H) 5/5/2022     Varicose veins of lower extremities with other complications     both legs         Past Surgical History:    Past Surgical History:   Procedure Laterality Date     COLONOSCOPY N/A 04/22/2022    Procedure: COLONOSCOPY, WITH POLYPECTOMY AND BIOPSY;  Surgeon: Everette Fischer MD;  Location: [facility] OR     COLONOSCOPY N/A 04/22/2022    Procedure: COLONOSCOPY, FLEXIBLE, WITH LESION REMOVAL USING SNARE;  Surgeon: Everette Fischer MD;  Location:  "[facility] OR     COLONOSCOPY WITH CO2 INSUFFLATION N/A 2022    Procedure: COLONOSCOPY, WITH CO2 INSUFFLATION;  Surgeon: Everette Fischer MD;  Location: [facility] OR     LIGATN/STRIP LONG & SHORT SAPHEN  +    both legs     PICC DOUBLE LUMEN PLACEMENT Right 2022    Right brachial -medial vein 0.45cm.Placement verified by Viky 3CG.PICC okay to use.         History of Radiation:  none      ICD: no      Medications:    No current outpatient medications on file.         Allergies:    Allergies   Allergen Reactions     No Known Drug Allergies          Family History:    Father--  in his 40s from brain tumor  Mother- leukemia      Social History:    Social History     Tobacco Use     Smoking status: Current Every Day Smoker     Packs/day: 0.07     Years: 33.00     Pack years: 2.31     Types: Other, Cigarettes     Smokeless tobacco: Former User     Quit date: 3/13/2015     Tobacco comment: have VaxInnate prescription   Substance Use Topics     Alcohol use: Yes     Comment: 12 pack per week         ECOG Performance Status:  3, spends all day in bed most days      REVIEW OF SYSTEMS:    General:  No fever/chills, + weight loss, no fatigue, no anorexia  HEENT:  No sore throat, no earache, no rhinitis, no vision changes  Cardiac:  No chest pain, + edema  Pulmonary:  No shortness of breath, no cough, no hemoptysis  Gastrointestinal:  No nausea, no vomiting, no diarrhea, no constipation, + occasional melena, no dysphagia, no odynophagia  Genitourinary:  No hematuria, no dysuria  Skin:  No rash, no itch, no jaundice  Hematologic:  No palpable lymph nodes, no bruising or bleeding tendencies  Neurologic:  No headache, no dizziness, no numbness or tingling, no weakness  Musculoskeletal:  No arthralgias, no myalgias  Psychiatric:  No anxiety, no depression      PHYSICAL EXAMINATION:    /65   Pulse 75   Temp 97.8  F (36.6  C) (Oral)   Resp 17   Ht 1.651 m (5' 5\")   Wt 70 kg (154 lb 5.2 oz)   LMP " "11/30/2012   SpO2 97%   BMI 25.68 kg/m    General:  Well-developed, well-appearing, NAD  HEENT:  NCAT, anicteric sclera, moist oral mucosa  Cardiac:  Strong peripheral pulses, no JVD, + peripheral edema  Pulmonary:  Breathing comfortably on room air, no stridor, no audible wheeze  Abdomen:  Nontender, nondistended, no palpable hepatosplenomegaly  Genitourinary:  No CVA tenderness, no zuleta catheter, ABRAHAN/speculum exam deferred  Skin:  Pale, warm, no rash  Lymph Nodes:  No palpable lymphadenopathy  Neurologic:  CN II - XII grossly intact, strength 5/5 in upper and lower extremities, sensation intact, alert and oriented x 3  MSK:  normal muscular bulk and tone, no tender bones or joints  Psychiatric:  normal affect, normal attention        DATA REVIEWED:  IMAGING:  CTA abdomen/pelvis (5-5-22)        IMPRESSION:  Ms. Goodrich is a 58yo lady with new diagnosis colon adenocarcinoma with metastases to liver, lymph nodes and lung who presents with severe anemia related to chronic blood loss who we are consulted to see for possible palliative radiotherapy.    RECOMMENDATIONS:  We recommend discussion of her case at multidisciplinary tumor conference 5-9-22 to determine whether to proceed with chemotherapy or palliative radiotherapy at this time. As she is stable without acute bleeding, we could proceed with chemotherapy in order to shrink the rectal mass and reserve radiation for later.     Radiation will likely play a role in her treatment in order to help control bleeding and alleviate obstruction. There are several dosing and fractionation options from which to choose. We could proceed with 25Gy in 5 fractions as a more convenient, but equally effective course of treatment. We could also consider \"quad shot\" which is 3.85Gy BID every other day for 4 treatments, which can be repeated up to three times. Lastly, we could consider a conventional palliative approach 30Gy in 10 fractions.     If Ms. Goodrich has acute bleeding " while inpatient, we would recommend stabilization by way of either endoscopy performed by gastroenterology or IR embolization. We will plan to follow up with her next week to communicate consensus recommendations of the tumor board. If radiation is recommended, we will set up an appointment in our clinic to sign consents and perform CT simulation.    I briefly discussed acute side effects of radiation including diarrhea, hematochezia, urinary frequency, proctitis, radiation dermatitis and fatigue. Ms. Goodrich asked many questions which I answered.    Thank you for allowing us to participate in this patient's care.  Please feel free to call with any questions or concerns.    The patient was seen by and discussed with attending physician Dr. Moser who agrees with the above history, physical exam, assessment and plan.      Radha Anders MD PGY-2  Radiation Oncology, [facility]  [phone] [facility]

## 2022-05-06 NOTE — PROCEDURES
[facility]    Double Lumen PICC Placement    Date/Time: 5/5/2022 7:38 PM  Performed by: Janak Marvin RN  Authorized by: Dayna Bangura PA-C   Indications: vascular access      UNIVERSAL PROTOCOL   Site Marked: Yes  Prior Images Obtained and Reviewed:  Yes  Required items: Required blood products, implants, devices and special equipment available    Patient identity confirmed:  Verbally with patient, arm band, provided demographic data, hospital-assigned identification number and anonymous protocol, patient vented/unresponsive  NA - No sedation, light sedation, or local anesthesia  Confirmation Checklist:  Patient's identity using two indicators, relevant allergies, procedure was appropriate and matched the consent or emergent situation and correct equipment/implants were available  Time out: Immediately prior to the procedure a time out was called    Universal Protocol: the Joint Commission Universal Protocol was followed    Preparation: Patient was prepped and draped in usual sterile fashion       ANESTHESIA    Anesthesia: Local infiltration  Local Anesthetic:  Lidocaine 1% without epinephrine  Anesthetic Total (mL):  5      SEDATION    Patient Sedated: No        Preparation: skin prepped with 2% chlorhexidine  Skin prep agent: skin prep agent completely dried prior to procedure  Sterile barriers: maximum sterile barriers were used: cap, mask, sterile gown, sterile gloves, and large sterile sheet  Hand hygiene: hand hygiene performed prior to central venous catheter insertion  Type of line used: Power PICC  Catheter type: double lumen  Lumen type: non-valved  Catheter size: 5 Fr  Brand: Bard  Lot number: [id number]  Placement method: venipuncture, MST, ultrasound and tip navigation system  Number of attempts: 1  Difficulty threading catheter: no  Successful placement: yes  Orientation: right    Location: brachial vein (medial) (0.45cm)  Tip Location: superior  vena cava  Arm circumference: adults 10 cm  Extremity circumference: 25  Visible catheter length: 5  Total catheter length: 45  Dressing and securement: adhesive securement device, alcohol impregnated caps, blood cleaned with CHG, blood removed, chlorhexidine patch applied, fixation device, line secured, secure lock, securement device, site cleansed and transparent securement dressing  Post procedure assessment: blood return through all ports, free fluid flow and placement verified by 3CG technology  PROCEDURE   Patient Tolerance:  Patient tolerated the procedure well with no immediate complicationsDescribe Procedure: Right brachial-medial vein 0.45cm.Placement verified by Viky 3CG.PICC okay to use.

## 2022-05-06 NOTE — PROGRESS NOTES
05/06/22 1400   Quick Adds   Type of Visit Initial Occupational Therapy Evaluation   Living Environment   People in Home spouse   Current Living Arrangements house   Home Accessibility stairs to enter home;stairs within home   Number of Stairs, Main Entrance 3   Number of Stairs, Within Home, Primary greater than 10 stairs   Stair Railings, Within Home, Primary railings on both sides of stairs   Transportation Anticipated car, drives self;family or friend will provide   Self-Care   Usual Activity Tolerance good   Current Activity Tolerance moderate   Regular Exercise No   Equipment Currently Used at Home none   Fall history within last six months yes  (slipped on ice)   Number of times patient has fallen within last six months 1   Activity/Exercise/Self-Care Comment fatigue is limiting factor   General Information   Referring Physician Meño Kimble   Patient/Family Therapy Goal Statement (OT) return home to I ADL   Additional Occupational Profile Info/Pertinent History of Current Problem Demetria Goodrich is a 57 year old female with PMH of alcohol use, HLD, and rectal cancer. who was admitted on 5/5/2022 for acute blood loss anemia c/f GIB.   General Observations and Info pt motivated in therapy.   Cognitive Status Examination   Orientation Status orientation to person, place and time   Memory Deficit moderate deficit   Cognitive Status Comments pt at times with statements that demonstrate confusion, further testing required. Pt endorsinggoing to home deopt for groceries.   Visual Perception   Visual Impairment/Limitations WFL   Sensory   Sensory Quick Adds No deficits were identified   Range of Motion Comprehensive   General Range of Motion no range of motion deficits identified   Strength Comprehensive (MMT)   General Manual Muscle Testing (MMT) Assessment other (see comments)   Comment, General Manual Muscle Testing (MMT) Assessment overall deconditioning   Coordination   Coordination Comments no concerns.    Bed Mobility   Bed Mobility supine-sit;sit-supine   Supine-Sit Chemult (Bed Mobility) independent   Sit-Supine Chemult (Bed Mobility) independent   Transfers   Transfers sit-stand transfer;toilet transfer   Sit-Stand Transfer   Sit-Stand Chemult (Transfers) modified independence   Toilet Transfer   Chemult Level (Toilet Transfer) modified independence   Balance   Balance Assessment standing balance: dynamic   Balance Comments good balance.   Activities of Daily Living   BADL Assessment/Intervention lower body dressing   Lower Body Dressing Assessment/Training   Comment, (Lower Body Dressing) educteion in AE would be beneficial.   Chemult Level (Lower Body Dressing) minimum assist (75% patient effort)   Clinical Impression   Criteria for Skilled Therapeutic Interventions Met (OT) Yes, treatment indicated   OT Diagnosis decreased ADL I/activity tolerance.   Assessment of Occupational Performance 3-5 Performance Deficits   Identified Performance Deficits community mobility, medication management, dressing   Planned Therapy Interventions (OT) ADL retraining;IADL retraining;cognition;strengthening;transfer training;home program guidelines;progressive activity/exercise;risk factor education   Clinical Decision Making Complexity (OT) low complexity   Risk & Benefits of therapy have been explained evaluation/treatment results reviewed;care plan/treatment goals reviewed;risks/benefits reviewed;current/potential barriers reviewed;participants voiced agreement with care plan;participants included;patient   Clinical Impression Comments Pt presents to OT with decreased activity toleracne, deconditioning and slight cognitive deficits (further testing required). all leading to decreased ADL I/safety.   OT Discharge Planning   OT Discharge Recommendation (DC Rec) home with assist  (continue wiht home PT)   OT Rationale for DC Rec pt expected to progress well   OT Brief overview of current status pt  scoring 22 on SLUMS indicating mild neurocognitive deficits, ambulating in hallway SBA with no AD no LOB, mod I toilet transfer and toileting.   OT Goals   Therapy Frequency (OT) 3 times/wk   OT Predicted Duration/Target Date for Goal Attainment 05/19/22   OT Goals Upper Body Dressing;Lower Body Dressing;Cognition;OT Goal 1   OT: Upper Body Dressing Modified independent   OT: Lower Body Dressing Modified independent   OT: Cognitive Patient/caregiver will verbalize understanding of cognitive assessment results/recommendations as needed for safe discharge planning   OT: Goal 1 pt will complete wiht 100% accuracy a new medication setup task

## 2022-05-06 NOTE — PROVIDER NOTIFICATION
05/05/22 1800   Call Information   Date of Call 05/05/22   Time of Call 1734   Name of person requesting the team Jennifer   Title of person requesting team RN   RRT Arrival time 1736   Time RRT ended 1935  (Hand off to Mendez, and NOC shift)   Reason for call   Type of RRT Adult   Primary reason for call Sepsis suspected   Sepsis Suspected Elevated Lactate level;SPB <90 or MAP 40mmHG;WBC <4 or >12   Was patient transferred from the ED, ICU, or PACU within last 24 hours prior to RRT call?   (Currently in ED)   SBAR   Situation LA=8.2   Background 57 year old female with a history of T3d rectal adenocarcinoma with metastasis to the liver, anemia, tobacco presents from clinic with hgb 4.9 (though 5.9 here) versus hgb 8.4 on 4/1. GI consulted for anemia   Notable History/Conditions End-Stage disease  (very recent diagnosis of cancer)   Assessment A/OX3, hypotensive, pale, BLE +4 edema, reports feeling slightly tired.   Interventions Fluid bolus;Labs;Portable monitor;Other (describe)  (PRBC)   Patient Outcome   Patient Outcome   (Plan to move to stabe room 2)   RRT Team   Attending/Primary/Covering Physician Dayna Bangura   Date Attending Physician notified 05/05/22   Time Attending Physician notified 1734   Physician(s) Genna Luevano   Lead RN Hortensia Stewart   Post RRT Intervention Assessment   Post RRT Assessment Stable/Improved   Date Follow Up Done 05/05/22   Time Follow Up Done 2030   Comments BP 90s/60s, MAP 60s with blood infusion

## 2022-05-06 NOTE — PROGRESS NOTES
Admitted/transferred from: ED  Reason for admission/transfer: Lactic acid  2 RN skin assessment: completed by Sarkis RODRIGUEZ  Result of skin assessment and interventions/actions: WNL. Preventative sacral mepilex placed  Patient belongings (see Flowsheet)    ?

## 2022-05-06 NOTE — PROGRESS NOTES
"CLINICAL NUTRITION SERVICES - ASSESSMENT NOTE     Nutrition Prescription    RECOMMENDATIONS FOR MDs/PROVIDERS TO ORDER:  Recommend low fiber diet given mechanical SBO from rectal tumor - paged team with recommendation (GI also recommends). Not counting fiber from supplements.    Malnutrition Status:    Severe malnutrition in the context of acute illness    Recommendations already ordered by Registered Dietitian (RD):  Discontinued kcal cts since pt NPO earlier today and with SBO - diet since advanced.    Supplements:   -Ensure Enlive Strawberry (Chocolate is ok if out of vanilla) @ 10am, 2pm, & 8pm  -Special K bar chocolate sample sent - pt allowed to order PRN    Provided [employer] supplement coupons (out of Ensure coupons).    Future/Additional Recommendations:  Monitor bowel status and tolerance and adequacy of oral diet. Consider resume kcal cts pending LOS.    If TPN becomes nutrition POC, rec:  --Use dosing weight 60 kg  --Begin TPN, goal volume minimal volume per PharmD with initial 130g Dex daily (442 kcal, GIR 1.5), 100g AA daily (400 kcal), and 250 ml 20% IV lipids 2x/week (lower dose d/t lipid shortage - minimal required to prevent EFA deficiency).  Micro/Rx: Infuvite/MTE or per PharmD discretion  --ONLY once pt receives ~100% of initial continuous PN volume with K+/Mg++/Phos WNL, advance PN dex by 45 g every 1-2 days (pending lytes/Glu and Pharm D/RD discretion) to initial goal of 220g Dex (748 kcal) to increase provisions to 1291 kcals (22 kcal/kg/day), 1.7 g PRO/kg/day, GIR 2.5 with 11% kcals from Fat.       REASON FOR ASSESSMENT  Demetria Goodrich is a/an 57 year old female assessed by the dietitian for Provider Order - \"wt and probable severe malnutrition 2/2 newly diagnosed rectal adenocarcinoma\"    NUTRITION HISTORY  Not previously known to Clinical Nutrition. No food allergies noted.   Per pt, she's been having poor appetite over the past few months. She used to be a \"meat and potatoes\" type of eater, " "but lately, she's been eating barely anything of light foods such as fresh fruit and salad. She's occasionally had some Ensure drinks per advice from a nurse in her family. She reports feeling better after drinking these but state the cost is a barrier to buying them all the time.     CURRENT NUTRITION ORDERS  Diet: Regular  Intake/Tolerance: Pt ate pollock, broccoli, and mashed potatoes for lunch. She reports feeling \"stuffed\". She likes Ensure drinks and would like to receive these inpatient. She's interested in protein bars and would like to try Special K bars.    LABS  Labs reviewed  Electrolytes  Potassium (mmol/L)   Date Value   05/06/2022 3.4   05/05/2022 3.9   05/05/2022 3.5   07/23/2020 4.6   09/28/2012 4.4   10/01/2011 3.8     Phosphorus (mg/dL)   Date Value   05/05/2022 4.2   05/05/2022 3.3    Blood Glucose  Glucose (mg/dL)   Date Value   05/06/2022 109 (H)   05/05/2022 81   05/05/2022 102 (H)   05/05/2022 101 (H)   07/30/2020 94   07/23/2020 106 (H)   03/13/2018 98   09/28/2012 97   10/01/2011 114 (H)     GLUCOSE BY METER POCT (mg/dL)   Date Value   05/06/2022 113 (H)     Hemoglobin A1C POCT (%)   Date Value   07/30/2020 5.3    Inflammatory Markers  CRP Inflammation (mg/L)   Date Value   05/05/2022 210.0 (H)     WBC (10e9/L)   Date Value   09/28/2012 8.2   10/01/2011 6.6   03/10/2011 8.8     WBC Count (10e3/uL)   Date Value   05/06/2022 24.8 (H)   05/06/2022 21.9 (H)   05/05/2022 27.3 (H)     Albumin (g/dL)   Date Value   05/06/2022 2.2 (L)   05/05/2022 1.9 (L)   05/05/2022 1.8 (L)   09/28/2012 4.0   03/10/2011 4.3   03/16/2010 4.2      Magnesium (mg/dL)   Date Value   05/05/2022 2.0     Sodium (mmol/L)   Date Value   05/06/2022 138   05/05/2022 135   05/05/2022 136   07/23/2020 138   09/28/2012 139   10/01/2011 146 (H)    Renal  Urea Nitrogen (mg/dL)   Date Value   05/06/2022 30   05/05/2022 30   05/05/2022 25   07/23/2020 11   09/28/2012 14   10/01/2011 12     Creatinine (mg/dL)   Date Value " "  05/06/2022 1.57 (H)   05/05/2022 1.28 (H)   05/05/2022 1.12 (H)   07/23/2020 0.94   09/28/2012 0.77   10/01/2011 0.72     Additional  Triglycerides (mg/dL)   Date Value   04/01/2022 227 (H)   07/23/2020 101   03/13/2018 183 (H)   09/28/2012 69     Ketones Urine (mg/dL)   Date Value   05/05/2022 Negative   01/31/2013 Negative        MEDICATIONS  Medications reviewed  -Folvite  -Thera-vit-M  -Thiamine  -LR gtt    ANTHROPOMETRICS  Height: 165.1 cm (5' 5\")  Most Recent Weight: 70 kg (154 lb 5.2 oz)    IBW: 56.8 kg - 123% IBW  UBW: ~180-185 lbs per pt  BMI: Overweight BMI 25-29.9  Weight History: Limited wt hx below. Pt reports 30 lb wt loss within the past few months which would amount to ~16% wt loss.  Wt Readings from Last 20 Encounters:   05/05/22 70 kg (154 lb 5.2 oz)   05/05/22 70 kg (154 lb 6.4 oz)   04/13/22 70.9 kg (156 lb 3.2 oz)   04/01/22 71.2 kg (157 lb)   02/01/22 73.9 kg (163 lb)   07/30/20 83.4 kg (183 lb 12.8 oz)   07/23/20 81.4 kg (179 lb 6.4 oz)   03/13/18 90.7 kg (200 lb)   01/31/13 83.5 kg (184 lb)   09/28/12 78.5 kg (173 lb)   10/01/11 83.5 kg (184 lb)   03/10/11 86.2 kg (190 lb)   03/16/10 74.9 kg (165 lb 3.2 oz)   11/05/07 74.8 kg (165 lb)   03/30/05 68.8 kg (151 lb 9.6 oz)   Dosing Weight: 60 kg (adjusted, based on lowest wt this admit of 69.9 kg on 5/5 and IBW of 56.8 kg)    ASSESSED NUTRITION NEEDS  Estimated Energy Needs: 6914-7558+ kcals/day (25 - 30+ kcals/kg) if PO/EN, 5852-9844 kcals/day (20 - 25 kcals/kg) if TPN   Justification: Maintenance to increased needs with metastatic disease  Estimated Protein Needs: 72-90+ grams protein/day (1.2 - 1.5+ grams of pro/kg)  Justification: Increased needs in setting of metastatic disease, repletion  Estimated Fluid Needs: 1 mL/kcal   Justification: Maintenance or Per provider pending fluid status    PHYSICAL FINDINGS  See malnutrition section below.  -Skin is thin/fragile  -Hair, nails appear WNL    MALNUTRITION  % Intake: </= 50% for >/= 5 days " (severe)  % Weight Loss: > 7.5% in 3 months (severe)  Subcutaneous Fat Loss: Upper arm:  Mild and Lower arm:  Mild  Muscle Loss: Thoracic region (clavicle, acromium bone, deltoid, trapezius, pectoral):  Mild, Upper arm (bicep, tricep):  Moderate and Lower arm  (forearm):  Moderate  Fluid Accumulation/Edema: Mild (2+ BLE edema per RN flowsheets)  Malnutrition Diagnosis: Severe malnutrition in the context of acute illness    NUTRITION DIAGNOSIS  Inadequate oral intake related to hypermetabolism in setting of metastatic disease and altered GI function (mechanical pSBO) as evidenced by chart review, pt report of 30 lb wt loss over past few months, and signs of mild subcutaneous adipose tissue loss and mild-moderate muscle wasting on physical exam.      INTERVENTIONS  Implementation  -Nutrition Education: Provided education on RD role, role of NFPE. Encouraged high protein, high kcal, lower fiber diet. Discussed reasoning for low fiber diet to help prevent blockages. Encouraged Ensure drinks. Provided handouts on Low Fiber Diet from the Nutrition Care Manual. Included protein and kcal goals on handout.  -Medical food supplement therapy  -Parenteral Nutrition/IV Fluids - TPN recs if needed     Goals  Patient to consume % of nutritionally adequate meal trays TID, or the equivalent with supplements/snacks.     Monitoring/Evaluation  Progress toward goals will be monitored and evaluated per protocol.    Barbra Bernal RD, LD  Pager: [phone]

## 2022-05-06 NOTE — CARE PLAN
Care hours [id number]    Major Shift Events: VSS on RA. Orders received for the floor early in the shift. Patient having frequent loose brown stools. 1 L bolus given. Borderline urine output. At times difficult to assess as it may have mixed with stool.     For vital signs and complete assessments, please refer to the flowsheets.

## 2022-05-06 NOTE — H&P
MEDICAL ICU H&P  05/06/2022    Date of Hospital Admission: 5/5/22  Date of ICU Admission: 5/5/22  Reason for Critical Care Admission: Acute blood loss anemia   Date of Service (when I saw the patient): 05/06/2022    ASSESSMENT: Demetria Goodrich is a 57 year old female with PMH of alcohol use, HLD, and rectal cancer. who was admitted on 5/5/2022 for acute blood loss anemia c/f GIB.     PLAN:    Neuro:  # Alcohol Use Disorder   Daily drinker. BAL 0.05.   - On CIWA  - Folate, thiamine     # Bipolar disorder:   - Continue PTA Abilify 10 mg daily    Pulmonary:  # Hx of SHARMA  No oxygen requirement, likely, 2/2 symptomatic anemia.  - TTE ordered     Cardiovascular:    # Circulatory shock   # Lactic Acidosis   Initially normotensive , however, demonstrated lower BP with maps in the 60s, partially responsive to fluid. Likely hemorrhagic shock vs septic shock.   - GI consulted: No plans for inpatient GI procedure at this point   - IR consulted  - Colorectal consulted  - octreotide gtt   - IV BID PPI  - Transfuse for Hgb goal >7  - MIVF 125 ml/hr  - Trend lactate and Hgb Q4H   - Abx: Vanc/Zosyn    # Tachycardia  Improved, likely 2/2 GIB.      # Hx of HLD  - Hold PTA statin     GI/Nutrition:  # Acute blood loss anemia  # Recurrent melena and recent hematemesis  # Severe lactic acidosis  Patient reporting several months of dark stools and one episode of possible hematemesis. Hgb at PCP today 4.9, repeat Hgb 5.8. Labs concerning for GIB vs possible variceal bleed ( abnl liver findings and alcohol use).   - GI consulted  - octreotide gtt   - IV BID PPI  - On MIVF  - Trend Hgb Q4H    - Transfuse for Hgb <7  - 2 PIV (large bore if possible)   - NPO   - B12, Folate, LDH, smear, haptoglobin    # Mid/distal moderate grade SBO   Transition point at group of bowel loops near malignancy site. Having small BMs.  - NPO  - Consider NGT  - Colorectal consult AM    # Abnormal LFTs  # Coagulapathy   Up trending since 4/2022. Likely 2/2 known  liver metastasis. Alcohol use could also be contributing.   - GI consulted: consider discussing with biliary team   - Consider abdominal US w/ doppler   - Trend LFTs     # Probable severe malnutrition:  Poor intake over the past few months with apparent 30 lb wt loss.   - Nutrition consulted    Renal/Fluids/Electrolytes:  # Possible UTI  Increased urinary frequency. UA with trace LE, few bac and 5-10 WBCs. UC pending.   - Zosyn   - Follow Urine cultures     # SERENITY  Likely pre-renal vs ATN. Patient with decreased UOP the past few hours.   - Trend  - Straight cath     # AGMA  # Lactic Acidosis  Likely 2/2 shock from GIB as well as metastatic cancer.  - Trend lactate  - IVF/blood as needed    Endocrine:  # No acute issues.    ID:  # Leukocytosis  Patient afebrile. Likely stress de margination vs UTI vs abdominal infection.    - BCx2, UA/UC  - CT abdomen, CXR  - Abx: Zosyn + Vanc    Hematology:    # Metastatic rectal adenocarcinoma, T3d  Patient reporting 30 lb wt loss, lack of appetite, weakness and SHARMA. Also with recurrent melena. Underwent colonoscopy this past April that revealed large rectal mass with pathology + for rectal adenocarcinoma. Staging MRI 5/2 that revealed cancer T3d.   - Oncology consulted  - Consider Palliative consult     # Coagulopathy  Liver dysfunction.    Musculoskeletal/Skin:  # BLE edema:  US negative for DVT. Likely venous insufficiency vs third spacing.   - PCDs  - TTE    General Cares/Prophylaxis:    DVT Prophylaxis: None   GI Prophylaxis: PPI  Restraints: None  Family Communication: None, plan to visit in AM  Code Status: Full    Lines/tubes/drains:  - PICC, PIVx2    Disposition:  - Medical ICU     Patient seen and findings/plan discussed with medical ICU staff, Dr. Degroot.    Laci Padron MD      Clinically Significant Risk Factors Present on Admission             # Hypoalbuminemia: Albumin = 1.9 g/dL (Ref range: 3.4 - 5.0 g/dL) on admission, will monitor as appropriate   #  "Coagulation Defect: INR = 1.65 (Ref range: 0.85 - 1.15) and/or PTT = 37 Seconds (Ref range: 22 - 38 Seconds) on admission, will monitor for bleeding    # Acute Blood Loss Anemia   # Overweight: Estimated body mass index is 25.68 kg/m  as calculated from the following:    Height as of this encounter: 1.651 m (5' 5\").    Weight as of this encounter: 70 kg (154 lb 5.2 oz).            -----------------------------------------------------------------------    HISTORY PRESENTING ILLNESS:     57 year old female with history of alcohol use, HLD, and recent dx of rectal cancer.    Patient was at PCP appointment today for LE swelling/ and episode of possible hematemesis. Labs collected and demonstrated Hgb 4.8, and thus patient was told to go to ED.    In the ED, patient afebrile, tachycardic to 100s, and initially normotensive w/ /56. Labs notable for Hgb 5.8, Cr 1.12, Alk phos 1475, , ALT 62, INR 1.52,   Alcohol level of 0.05, , and lactate 10.1. US LE negative for DVT, CTA abd without source of bleed but demonstrates hepatic metastasis, wall thickening at rectosigmoid junction, and mid/distal moderate grade small bowel obstruction.     She was given 1 g Ceftriaxone, zosyn, Protonix, and octreotide gtt.    Of note, over the past few months, patient with 30 pounds of unintentional weight loss, poor appetite, generalized weakness, and SHARMA. She states her stool has been darker. She had colonoscopy 4/22/22 which demonstrated  large rectal mass with pathology positive for rectal adenocarcinoma. Staging MRI 5/2 demonstrated cancer T3d. While at MRI she vomited what was thought to be blood, per patient she had cranberry juice that she thinks it was. Denies any other episodes of hematemesis . Currently, she denies any abdominal pain, N/V, or lightheadedness. No fevers, chills, chest pain, SOB. She has been having dark stools for several months. Se also reports some urinary frequency but no dysuria.     REVIEW OF " SYSTEMS: 10 point ROS is negative unless otherwise stated above.     PAST MEDICAL HISTORY:   Past Medical History:   Diagnosis Date     ASCUS on Pap smear 2010     Cervical high risk HPV (human papillomavirus) test positive 4/1/2022 2005, 2010 ASCUS, neg HR HPV 2008, 2011, 2012 NIL paps 3/13/18 NIL pap, neg HPV 4/1/22 NIL pap, +HR HPV (not 16/18). Plan: cotest in 1 year     Chronic hypomanic disorder (H)      Other and unspecified alcohol dependence, unspecified drinking behavior      Rectal cancer (H) 5/5/2022     Varicose veins of lower extremities with other complications     both legs     SURGICAL HISTORY:  Past Surgical History:   Procedure Laterality Date     COLONOSCOPY N/A 04/22/2022    Procedure: COLONOSCOPY, WITH POLYPECTOMY AND BIOPSY;  Surgeon: Everette Fischer MD;  Location: [facility] OR     COLONOSCOPY N/A 04/22/2022    Procedure: COLONOSCOPY, FLEXIBLE, WITH LESION REMOVAL USING SNARE;  Surgeon: Everette Fischer MD;  Location: [facility] OR     COLONOSCOPY WITH CO2 INSUFFLATION N/A 04/22/2022    Procedure: COLONOSCOPY, WITH CO2 INSUFFLATION;  Surgeon: Everette Fischer MD;  Location: [facility] OR     LIGATN/STRIP LONG & SHORT SAPHEN  1990+1995    both legs     PICC DOUBLE LUMEN PLACEMENT Right 05/05/2022    Right brachial -medial vein 0.45cm.Placement verified by Shergaldino 3CG.PICC okay to use.     SOCIAL HISTORY:  Social History     Socioeconomic History     Marital status: Single     Spouse name: None     Number of children: None     Years of education: None     Highest education level: None   Tobacco Use     Smoking status: Current Every Day Smoker     Packs/day: 0.07     Years: 33.00     Pack years: 2.31     Types: Other, Cigarettes     Smokeless tobacco: Former User     Quit date: 3/13/2015     Tobacco comment: have Chantex prescription   Substance and Sexual Activity     Alcohol use: Yes     Comment: 12 pack per week     Drug use: No     Sexual activity: Yes     Partners: Male      Birth control/protection: None   Other Topics Concern     Parent/sibling w/ CABG, MI or angioplasty before 65F 55M? No     FAMILY HISTORY:   Family History   Problem Relation Age of Onset     Cancer Father         Brain tumor, age 32,  age 33     Other Cancer Father         Brain Cancer     Cancer - colorectal Mother         54     Other Cancer Mother         Blood Cancer     Leukemia Mother      Breast Cancer Mother      Colon Cancer Mother      Cancer - colorectal Maternal Uncle         56     Ovarian Cancer No family hx of      ALLERGIES:   Allergies   Allergen Reactions     No Known Drug Allergies      MEDICATIONS:  No current facility-administered medications on file prior to encounter.  ARIPiprazole (ABILIFY) 5 MG tablet, Take 1 tablet by mouth daily  naproxen sodium (ANAPROX) 220 MG tablet, Take 1 tablet (220 mg) by mouth 2 times daily (with meals)  simvastatin (ZOCOR) 40 MG tablet, Take 1 tablet (40 mg) by mouth At Bedtime  triamcinolone (KENALOG) 0.5 % external ointment, APPLY 1 GRAM TOPICALLY TO THE AFFECTED AREA TWICE DAILY (Patient not taking: Reported on 2022)  [DISCONTINUED] lamoTRIgine (LAMICTAL) 25 MG tablet, Take 1 tablet (25 mg) by mouth daily        PHYSICAL EXAMINATION:  Temp:  [97.2  F (36.2  C)-98.3  F (36.8  C)] 97.4  F (36.3  C)  Pulse:  [0-108] 81  Resp:  [7-29] 23  BP: ()/(51-74) 98/63  SpO2:  [94 %-100 %] 96 %  General: NAD, sitting up in bed  HEENT: NCAT, non-icteric sclera   Neuro: A&Ox3, NAD  Pulm/Resp: Bilateral crackles on bases, non-labored breathing  CV: RRR  Abdomen: hard, mild discomfort in LLQ  MSK: 3+ pitting edema up to knee  : No zuleta  Incisions/Skin: No rasah    LABS: Reviewed.   Arterial Blood Gases   No lab results found in last 7 days.  Complete Blood Count   Recent Labs   Lab 22  0333 22  0138 22  1715 22  1155 22  1001   WBC 21.9*  --   --  27.3* 28.1*   HGB 6.8* 7.1* 5.4* 5.8*  5.8* 4.8*     --   --  732 318      Basic Metabolic Panel  Recent Labs   Lab 05/05/22  1836 05/05/22  1155 05/05/22  1001 05/02/22  0956    136 134  --    POTASSIUM 3.9 3.5 3.1*  --    CHLORIDE 95 94 94  --    CO2 26 24 23  --    BUN 30 25 24  --    CR 1.28* 1.12* 1.03 1.2*   GLC 81 102* 101*  --      Liver Function Tests  Recent Labs   Lab 05/05/22  1848 05/05/22  1155 05/05/22  1001   AST  --  404* 388*   ALT  --  62* 58*   ALKPHOS  --  1,475* 1,407*   BILITOTAL  --  0.9 0.9   ALBUMIN  --  1.9* 1.8*   INR 1.65* 1.52*  --      Coagulation Profile  Recent Labs   Lab 05/05/22  1848 05/05/22  1155   INR 1.65* 1.52*   PTT 37 33       IMAGING:  Recent Results (from the past 24 hour(s))   US Lower Extremity Venous Bilateral Port    Narrative    EXAMINATION: DOPPLER VENOUS ULTRASOUND OF BILATERAL LOWER EXTREMITIES,  5/5/2022 3:18 PM     COMPARISON: None.    HISTORY: Edema    TECHNIQUE:  Gray-scale evaluation with compression, spectral flow and  color Doppler assessment of the deep venous system of both legs from  groin to knee, and then at the ankles.    FINDINGS:  Right: the common femoral, femoral, popliteal and posterior tibial  veins demonstrate normal compressibility and blood flow.    Left: the common femoral, femoral, popliteal and posterior tibial  veins demonstrate normal compressibility and blood flow.      Impression    IMPRESSION:  1.  No evidence of deep venous thrombosis in either lower extremity.    DEBRA ROBIN MD         SYSTEM ID:  [id number]   CTA Abdomen Pelvis with Contrast    Narrative    EXAM: CTA ABDOMEN PELVIS WITH CONTRAST  LOCATION: [facility]  DATE/TIME: 5/5/2022 9:30 PM    INDICATION: GI bleed. History of advanced, metastatic rectal adenocarcinoma. Anemia.  COMPARISON: None.  TECHNIQUE: CT angiogram abdomen pelvis during arterial phase of injection of IV contrast. 2D and 3D MIP reconstructions were performed by the CT technologist. Dose reduction techniques were  used.  CONTRAST: Isovue 370 100    FINDINGS:  ANGIOGRAM ABDOMEN/PELVIS: Normal caliber abdominal aorta, no aortic dissection. Moderately heavy atherosclerotic plaque present diffusely. The splanchnic, renal and iliac arteries are patent. No arterial occlusive changes are seen.  No focal area of bowel hyperemia or GI GI tract extravasation is identified. There is pre-existing high density material within left hemicolon and rectum which remains unchanged on all phases of this exam    LOWER CHEST: 7 mm pulmonary nodule posterior right base abutting diaphragm. There are 2 separate 3 mm nodules within inferior right middle lobe as well as inferior medial most aspect of left lower lobe. All of these are indeterminate.    HEPATOBILIARY: Innumerable diffuse hepatic metastases involving all aspects of liver. Largest discrete easily measurable nodules within lateral segment left lobe measuring 4 cm (series 13 image 69). There are conglomerate masses within right lobe that   are substantially larger up to 10 cm but more difficult to accurately measure.  Trace volume ascites present.    PANCREAS: Normal.    SPLEEN: Normal.    ADRENAL GLANDS: Normal.    KIDNEYS/BLADDER: 5 mm stone lower pole right kidney, no hydronephrosis.  Left kidney demonstrates approximately 4 nonobstructing stones, largest measures 4 mm at mid pole. No suspicious renal lesions.    BOWEL: Moderate circumferential wall thickening involving the rectosigmoid junction without well-defined mass. There is no colonic obstruction.  There is a mild amount of high density intraluminal material within rectum and sigmoid colon both upstream and downstream from the site of malignancy. No obvious superimposed bowel hyperemia or blood loss is seen though subtle blood loss would be   obscured by the pre-existing density.    There is, however, a mid/distal small bowel obstruction with a group of bowel loops in the pelvis that appears somewhat tethered including a loop that  demonstrates fecalization of bowel contents. A nondilated distal loop of small bowel appears to merge   from this tethered region. The tethering is near the site of the presumed colonic malignancy.    LYMPH NODES: A few small upper retroperitoneal lymph nodes are seen, largest is a aortocaval node measuring 1.9 x 0.9 cm (series 13 image 197) and 1.5 cm node image 155.    PELVIC ORGANS: No adnexal lesions. No free fluid.    MUSCULOSKELETAL: No suspicious bony lesions.      Impression    IMPRESSION:  1.  No GI bleed identified. No acute vascular abnormality evident.  2.  Bulky innumerable hepatic metastases. Tiny volume of ascites.  3.  Tiny indeterminate pulmonary nodules favored to be metastatic.  4.  Region of circumferential wall thickening at rectosigmoid junction favored to represent primary malignancy given history.  5.  Mid/distal moderate grade small bowel obstruction with the transition point occurring at a group of bowel loops appearing somewhat tethered in the pelvis, very near the colonic malignancy site.

## 2022-05-06 NOTE — PLAN OF CARE
Goal Outcome Evaluation:    Plan of Care Reviewed With: patient      [id number]    AOX4. Pt denies dizziness, nausea/vomiting, pain. AVSS on RA except BP started to decrease 80s/60s around 1800. Up with SBA to commode. 1 of 3 units of PRBCs infused. Critical results: lactic acid=8.2 RRT called at 1732, hgb=5.4. pt asymptomatic. BCX2 drawn, IV zosyn started. 2nd unit of PRBC released by RRT nurse to be infused stat. LR held to prevent fluid overload during PRBC infusion, also not compatible with octreotide. R PIVs X2. PICC ordered to be placed stat. Pt started to c/o intermittent upper abd pain. Upper abd is distended and firm. No stool this shift. 3+ edema in B feet, making it difficult for pt to walk. Plan to possibly admit to ICU.

## 2022-05-07 NOTE — PROGRESS NOTES
I visited with the patient this morning after she expressed desire to discharge from the hospital AGAINST MEDICAL ADVICE.  I spent 15 minutes discussing with her that she should remain in the hospital for ongoing monitoring of her condition including ongoing monitoring with telemetry, frequent blood draws, and the need for IV antibiotics and intravenous proton pump inhibitor as well as subspecialty comanagement. Dicussed risks of leaving including but not limited to worsening of her condition, hypotension (low blood pressure), ongoing anemia and bleeding, and death. She expressed understanding.    She illustrated to me that she had understanding of the risks of leaving the hospital AGAINST MEDICAL ADVICE and I deemed her as having capacity to make the decision to discharge prematurely.    Jordan Hinton MD on 5/7/2022 at 9:30 AM

## 2022-05-07 NOTE — DISCHARGE SUMMARY
[facility]  Hospitalist Discharge Summary      Date of Admission:  5/5/2022  Date of Discharge:  5/7/2022  Discharging Provider: Jordan Hinton MD  Discharge Service: Hospitalist Service, GOLD TEAM 8    Discharge Diagnoses   Acute anemia likely secondary to blood loss from gastrointestinal track  Melena  Recent hematemesis  Metastatic rectal adenocarcinoma with metastatic disease to the liver, lungs, peg hepatis lymph nodes  Lactic acidosis secondary to blood loss anemia, improved  Hypotension secondary to blood loss anemia, improved  Leukocytosis  Concern for malnutrition  Alcohol use disorder   Elevated LFTs  Possible UTI  Bipolar disorder  Hyperlipidemia    Follow-ups Needed After Discharge   Follow-up Appointments     Follow Up and recommended labs and tests      1. Follow up with colorectal surgery - you will need to make an   appointment with them.   2. Follow up with gastroenterology - you will need to make an appointment.     3. Follow up with oncology - you will need to make an appointment.             Discharge Disposition    I visited with the patient this morning after she expressed desire to discharge from the hospital AGAINST MEDICAL ADVICE.  I spent 15 minutes discussing with her that she should remain in the hospital for ongoing monitoring of her condition including ongoing monitoring with telemetry, frequent blood draws, and the need for IV antibiotics and intravenous proton pump inhibitor as well as subspecialty comanagement. Dicussed risks of leaving including but not limited to worsening of her condition, hypotension (low blood pressure), ongoing anemia and bleeding, and death. She expressed understanding.     She illustrated to me that she had understanding of the risks of leaving the hospital AGAINST MEDICAL ADVICE and I deemed her as having capacity to make the decision to discharge prematurely      Hospital Course   57-year-old woman with history  of EtOH use and recent diagnosis of metastatic rectal cancer who was admitted on 5/5 with concern for GIB and ultimately admitted to ICU with concerns for hemorrhagic and hypovolemic shock.    She was seen in consultation by oncology, gastroenterology, radiation oncology and colorectal surgery for concern for acute blood loss anemia resulting in hypovolemic and hemorrhagic shock.  After an initial short stay in the intensive care unit, decision was made to transfer the patient back to the medical floor.  Her hemoglobin had stabilized after resuscitation with blood products and fluids.  Her blood pressure and hemodynamics had also improved.  She was seen in consultation by gastroenterology who deferred upper and lower endoscopy.  She was seen in consultation by colorectal surgery with the decision made to present her case to the tumor board on 5/9/2022.  She was seen in consultation by radiation oncology and oncology for future management of her disease.    On the day of discharge I did spend some time visiting with her after she expressed desire to leave AGAINST MEDICAL ADVICE (see documentation above).    Consultations This Hospital Stay   COLORECTAL SURGERY ADULT IP CONSULT  PHYSICAL THERAPY ADULT IP CONSULT  OCCUPATIONAL THERAPY ADULT IP CONSULT  ONCOLOGY ADULT IP CONSULT  NUTRITION SERVICES ADULT IP CONSULT  GI LUMINAL ADULT IP CONSULT  VASCULAR ACCESS FOR PICC PLACEMENT ADULT IP CONSULT  PHARMACY TO DOSE VANCO  SURGERY GENERAL ADULT IP CONSULT  INTENSIVIST IP CONSULT  COLORECTAL SURGERY ADULT IP CONSULT  ADVANCE DIRECTIVE IP CONSULT  RADIATION ONCOLOGY IP CONSULT    Code Status   Full Code    Time Spent on this Encounter   IJordan MD, personally saw the patient today and spent greater than 30 minutes discharging this patient.       Jordan Hinton MD  [facility] [address] [address]  Phone:  [phone]  ______________________________________________________________________    Physical Exam   Vital Signs: Temp: 98.1  F (36.7  C) Temp src: Oral BP: 123/71 Pulse: 83   Resp: 23 SpO2: 99 %      Weight: 170 lbs 6.4 oz  Exam  Gen: awake and alert, appears comfortable, appears stated age  HEENT: NCAT, sclerae anicteric  CV: RRR, S1/S2, extremities warm and well perfused, no lower extremity edema  Pulm: normal work of breathing, lungs clear to auscultation, no crackles or wheezing  GI: Nontender, nondistended  Skin: warm, no jaundice  Neuro: Aox3, speech normal, moves all extremities symmetrically and equally  Psych: mood is good, affect is congruent         Primary Care Physician   Conner Sandoval    Discharge Orders      Reason for your hospital stay    You were in the hospital for anemia the (low hemoglobin), sepsis, gastrointestinal bleeding, rectal tumor, low potassium levels.  All of these medical conditions are life-threatening.  You were treated with blood transfusions, IV fluids, antibiotics, strong intravenous antacid medicines.  While your condition had improved somewhat, you are still at high risk of decompensating and getting much sicker and possibly dying therefore we did recommend that you stay in the hospital for ongoing monitoring, treatments including IV fluids and antibiotics and electrolyte replacements, repeat lab draws and consultant and specialty comanagement.  You explained to me that you understood the risks of leaving the hospital AGAINST MEDICAL ADVICE. I will send some vitamin medications, and an oral PPI to you [facility] pharmacy but this is not the standard of care as I have told you. We will not discharge you on antibiotics as I do not feel comfortable discharging you on them without ongoing monitoring in the hospital setting.     If you develop chest pain, shortness of breath, abdominal pain, lightheadedness, dizziness, bleeding, dark stools, red stools, confusion please  return to the emergency department promptly as these could be signs of your condition getting worse     Activity    Your activity upon discharge: activity as tolerated     Follow Up and recommended labs and tests    1. Follow up with colorectal surgery - you will need to make an appointment with them.   2. Follow up with gastroenterology - you will need to make an appointment.   3. Follow up with oncology - you will need to make an appointment.     Diet    Follow this diet upon discharge: Orders Placed This Encounter      Snacks/Supplements Adult: Ensure Enlive; Between Meals      Snacks/Supplements Adult: Other; Special K bar; Between Meals      Low Fiber Diet       Significant Results and Procedures   Results for orders placed or performed during the hospital encounter of 05/05/22   US Lower Extremity Venous Bilateral Port    Narrative    EXAMINATION: DOPPLER VENOUS ULTRASOUND OF BILATERAL LOWER EXTREMITIES,  5/5/2022 3:18 PM     COMPARISON: None.    HISTORY: Edema    TECHNIQUE:  Gray-scale evaluation with compression, spectral flow and  color Doppler assessment of the deep venous system of both legs from  groin to knee, and then at the ankles.    FINDINGS:  Right: the common femoral, femoral, popliteal and posterior tibial  veins demonstrate normal compressibility and blood flow.    Left: the common femoral, femoral, popliteal and posterior tibial  veins demonstrate normal compressibility and blood flow.      Impression    IMPRESSION:  1.  No evidence of deep venous thrombosis in either lower extremity.    DEBRA ROBIN MD         SYSTEM ID:  [id number]   CTA Abdomen Pelvis with Contrast    Narrative    EXAM: CTA ABDOMEN PELVIS WITH CONTRAST  LOCATION: [facility]  DATE/TIME: 5/5/2022 9:30 PM    INDICATION: GI bleed. History of advanced, metastatic rectal adenocarcinoma. Anemia.  COMPARISON: None.  TECHNIQUE: CT angiogram abdomen pelvis during arterial phase of  injection of IV contrast. 2D and 3D MIP reconstructions were performed by the CT technologist. Dose reduction techniques were used.  CONTRAST: Isovue 370 100    FINDINGS:  ANGIOGRAM ABDOMEN/PELVIS: Normal caliber abdominal aorta, no aortic dissection. Moderately heavy atherosclerotic plaque present diffusely. The splanchnic, renal and iliac arteries are patent. No arterial occlusive changes are seen.  No focal area of bowel hyperemia or GI GI tract extravasation is identified. There is pre-existing high density material within left hemicolon and rectum which remains unchanged on all phases of this exam    LOWER CHEST: 7 mm pulmonary nodule posterior right base abutting diaphragm. There are 2 separate 3 mm nodules within inferior right middle lobe as well as inferior medial most aspect of left lower lobe. All of these are indeterminate.    HEPATOBILIARY: Innumerable diffuse hepatic metastases involving all aspects of liver. Largest discrete easily measurable nodules within lateral segment left lobe measuring 4 cm (series 13 image 69). There are conglomerate masses within right lobe that   are substantially larger up to 10 cm but more difficult to accurately measure.  Trace volume ascites present.    PANCREAS: Normal.    SPLEEN: Normal.    ADRENAL GLANDS: Normal.    KIDNEYS/BLADDER: 5 mm stone lower pole right kidney, no hydronephrosis.  Left kidney demonstrates approximately 4 nonobstructing stones, largest measures 4 mm at mid pole. No suspicious renal lesions.    BOWEL: Moderate circumferential wall thickening involving the rectosigmoid junction without well-defined mass. There is no colonic obstruction.  There is a mild amount of high density intraluminal material within rectum and sigmoid colon both upstream and downstream from the site of malignancy. No obvious superimposed bowel hyperemia or blood loss is seen though subtle blood loss would be   obscured by the pre-existing density.    There is, however, a  mid/distal small bowel obstruction with a group of bowel loops in the pelvis that appears somewhat tethered including a loop that demonstrates fecalization of bowel contents. A nondilated distal loop of small bowel appears to merge   from this tethered region. The tethering is near the site of the presumed colonic malignancy.    LYMPH NODES: A few small upper retroperitoneal lymph nodes are seen, largest is a aortocaval node measuring 1.9 x 0.9 cm (series 13 image 197) and 1.5 cm node image 155.    PELVIC ORGANS: No adnexal lesions. No free fluid.    MUSCULOSKELETAL: No suspicious bony lesions.      Impression    IMPRESSION:  1.  No GI bleed identified. No acute vascular abnormality evident.  2.  Bulky innumerable hepatic metastases. Tiny volume of ascites.  3.  Tiny indeterminate pulmonary nodules favored to be metastatic.  4.  Region of circumferential wall thickening at rectosigmoid junction favored to represent primary malignancy given history.  5.  Mid/distal moderate grade small bowel obstruction with the transition point occurring at a group of bowel loops appearing somewhat tethered in the pelvis, very near the colonic malignancy site.   XR Chest Port 1 View    Narrative    Exam: XR CHEST PORT 1 VIEW, 5/6/2022 7:48 AM    Indication: Assess for pulmonary edema    Comparison: CT 5/2/2022    Findings:   Portable semiupright AP view of the chest. Right upper extremity PICC  with tip near the cavoatrial junction.    Trachea is midline. Heart size is normal. Low inspiratory volumes.  Mild diffuse interstitial opacities. Few streaky bibasilar and  retrocardiac opacities. No focal consolidation, appreciable pleural  effusion or pneumothorax. No acute osseous abnormality.      Impression    Impression: Findings which may represent mild pulmonary edema and  bibasilar atelectasis. No focal consolidation or appreciable pleural  effusion.    I have personally reviewed the examination and initial interpretation  and I  agree with the findings.    MONALISA HENLEY MD         SYSTEM ID:  [id number]   Echo Complete     Value    LVEF  60-65%    Narrative    [id number]  [id number]  [id number]  [id number]^LYNDSEY^ELIJAH^SHANNON     [facility],[place]  Echocardiography Laboratory  [address] [place] [id number]     Name: REN NAGEL  MRN: [id number]  : 1965  Study Date: 2022 07:22 AM  Age: 57 yrs  Gender: Female  Patient Location: [facility]  Reason For Study: SHARMA  Ordering Physician: ELIJAH SOLORIO  Performed By: [facility] Lauren Marcelo     BSA: 1.8 m2  Height: 65 in  Weight: 154 lb  BP: 124/69 mmHg  ______________________________________________________________________________  Procedure  Complete Portable Echo Adult.  ______________________________________________________________________________  Interpretation Summary  Global and regional left ventricular function is normal with an EF of 60-65%.  Left ventricular diastolic function is normal.  Global right ventricular function is normal.  The inferior vena cava was normal in size with preserved respiratory  variability.  No significant valvular abnormalities present.     Previous study not available for comparison.  ______________________________________________________________________________  Left Ventricle  Left ventricular size is normal. Left ventricular wall thickness is normal.  Global and regional left ventricular function is normal with an EF of 60-65%.  Left ventricular diastolic function is normal.     Right Ventricle  The right ventricle is normal size. Global right ventricular function is  normal.     Atria  Both atria appear normal.     Mitral Valve  The mitral valve is normal.     Aortic Valve  Aortic valve is normal in structure and function.     Tricuspid Valve  The tricuspid valve is normal.     Pulmonic Valve  The pulmonic valve is normal.     Vessels  The aorta root is normal. The inferior vena cava was normal in size  with  preserved respiratory variability.     Pericardium  No pericardial effusion is present.     Miscellaneous  No significant valvular abnormalities present.     Compared to Previous Study  Previous study not available for comparison.  ______________________________________________________________________________  MMode/2D Measurements & Calculations  IVSd: 0.96 cm  LVIDd: 5.1 cm  LVIDs: 3.4 cm  LVPWd: 0.90 cm  FS: 33.8 %  LV mass(C)d: 170.2 grams  LV mass(C)dI: 96.1 grams/m2  Ao root diam: 3.1 cm  asc Aorta Diam: 3.5 cm  LVOT diam: 2.1 cm  LVOT area: 3.5 cm2  LA Volume (BP): 40.7 ml     LA Volume Index (BP): 23.0 ml/m2  RWT: 0.35     Doppler Measurements & Calculations  MV E max jocelyn: 90.7 cm/sec  MV A max jocelyn: 64.0 cm/sec  MV E/A: 1.4  MV dec slope: 493.0 cm/sec2  PA V2 max: 81.4 cm/sec  PA max P.7 mmHg  PA acc time: 0.15 sec  TR max jocelyn: 191.0 cm/sec  TR max P.6 mmHg  E/E' avg: 10.4  Lateral E/e': 10.2  Medial E/e': 10.7     ______________________________________________________________________________  Report approved by: Candice AGUERO 2022 10:06 AM               Discharge Medications   Current Discharge Medication List      START taking these medications    Details   folic acid (FOLVITE) 1 MG tablet Take 1 tablet (1 mg) by mouth daily  Qty: 30 tablet, Refills: 0    Associated Diagnoses: Gastrointestinal hemorrhage, unspecified gastrointestinal hemorrhage type      multivitamin w/minerals (THERA-VIT-M) tablet Take 1 tablet by mouth daily  Qty: 30 tablet, Refills: 0    Associated Diagnoses: Gastrointestinal hemorrhage, unspecified gastrointestinal hemorrhage type      pantoprazole (PROTONIX) 40 MG EC tablet Take 1 tablet (40 mg) by mouth 2 times daily  Qty: 60 tablet, Refills: 0    Associated Diagnoses: Gastrointestinal hemorrhage, unspecified gastrointestinal hemorrhage type      potassium chloride (KLOR-CON) 20 MEQ packet Take 40 mEq by mouth daily for 2 days  Qty: 4 packet, Refills: 0     Associated Diagnoses: Hypokalemia      thiamine (B-1) 100 MG tablet Take 1 tablet (100 mg) by mouth daily  Qty: 30 tablet, Refills: 0    Associated Diagnoses: Gastrointestinal hemorrhage, unspecified gastrointestinal hemorrhage type         CONTINUE these medications which have NOT CHANGED    Details   ARIPiprazole (ABILIFY) 5 MG tablet Take 1 tablet by mouth daily      simvastatin (ZOCOR) 40 MG tablet Take 1 tablet (40 mg) by mouth At Bedtime  Qty: 90 tablet, Refills: 3    Associated Diagnoses: Hyperlipidemia LDL goal <100      triamcinolone (KENALOG) 0.5 % external ointment APPLY 1 GRAM TOPICALLY TO THE AFFECTED AREA TWICE DAILY  Qty: 30 g, Refills: 1    Associated Diagnoses: Polymorphic light eruption         STOP taking these medications       naproxen sodium (ANAPROX) 220 MG tablet Comments:   Reason for Stopping:             Allergies   Allergies   Allergen Reactions     No Known Drug Allergies

## 2022-05-07 NOTE — PROGRESS NOTES
Major Shift Events: VSS on RA. Patient immediately requested to discharge to home upon my shift start. I notified the service of her requests and she ultimately discharged AMA. I removed patients IVs and PICC line prior to discharge.  Patient having frequent loose brown stools. The patient was educated extensively against leaving AMA however, decided she wanted to discharge to home.      For vital signs and complete assessments, please refer to the flowsheets

## 2022-05-09 NOTE — TELEPHONE ENCOUNTER
We discussed Zoya's case at tumor board today and the plan is for her to start palliative chemotherapy. Everyone at tumor board agrees that we did not need to divert her at this time. LVM with oZya and stated that the most important visit is with Oncology scheduled for the 16th. No need to reschedule with Dr. Nelson at this time.

## 2022-05-09 NOTE — PROGRESS NOTES
[facility]: Cancer Care Short Note                                                                                          Incoming Call: Phone call from Demetria.  She has a new patient appointment scheduled with Dr. Saunders on 5/16.  She is wondering if she needs to keep her 5/11 appointment with Dr. Funes.  RNCC advised she keep both appointments.  Patient denies other questions or needs at this time.    Dominique Ann RN, BSN  RN Care Coordinator   [facility]

## 2022-05-09 NOTE — TUMOR CONFERENCE
Tumor Conference Information  Tumor Conference: Colorectal  Specialties Present: Medical oncology, Radiation oncology, Radiology, Surgery  Patient Status: Prospective  Stage: adenocarcinoma (rectal)- T3d  Treatment to Date: None  Clinical Trials: Not discussed  Genetic Testing Discussed/Recommended?: No  Supportive Care Services Discussed/Recommended?: No  Recommended Plan: Follows evidence-based guidelines (Comment: Chemotherapy)  Did the review exceed 30 minutes?: did not           Documentation / Disclaimer Cancer Tumor Board Note  Cancer tumor board recommendations do not override what is determined to be reasonable care and treatment, which is dependent on the circumstances of a patient's case; the patient's medical, social, and personal concerns; and the clinical judgment of the oncologist [physician].

## 2022-05-09 NOTE — TELEPHONE ENCOUNTER
"Received call from patient. She states that she was in the ICU recently and is looking for further recommendations on follow up/ongoing monitoring and care. She was advised that hospital f/u visit is recommended to determine next steps and she agreed with this plan. Scheduled patient for hospital f/u with PCP. Of note, patient left AMA from the hospital. Per AVS:     \"Reason for your hospital stay  You were in the hospital for anemia the (low hemoglobin),  sepsis, gastrointestinal bleeding, rectal tumor, low potassium  levels. All of these medical conditions are life-threatening.  You were treated with blood transfusions, IV fluids,  antibiotics, strong intravenous antacid medicines. While your  condition had improved somewhat, you are still at high risk  of decompensating and getting much sicker and possibly  dying therefore we did recommend that you stay in the  hospital for ongoing monitoring, treatments including IV  fluids and antibiotics and electrolyte replacements, repeat lab  draws and consultant and specialty comanagement. You  explained to me that you understood the risks of leaving the  hospital AGAINST MEDICAL ADVICE. I will send some vitamin  medications, and an oral PPI to you [facility] pharmacy but  this is not the standard of care as I have told you. We will not  discharge you on antibiotics as I do not feel comfortable  discharging you on them without ongoing monitoring in the  hospital setting.  If you develop chest pain, shortness of breath, abdominal  pain, lightheadedness, dizziness, bleeding, dark stools, red  stools, confusion please return to the emergency department  promptly as these could be signs of your condition getting  Worse\"    ANDREW West RN  [facility] [place]    "

## 2022-05-09 NOTE — PLAN OF CARE
Occupational Therapy Discharge Summary    Reason for therapy discharge:    Discharged to home.    Progress towards therapy goal(s). See goals on Care Plan in [facility] electronic health record for goal details.  Goals partially met.  Barriers to achieving goals:   discharge from facility.    Therapy recommendation(s):    No further therapy is recommended.

## 2022-05-09 NOTE — TELEPHONE ENCOUNTER
Health Call Center    Phone Message    May a detailed message be left on voicemail: yes     Reason for Call: Other: Demetria is calling in asking for a call back. She states that she had been scheduled for an appt with Dr. Nelson on 5/5, but ended up in the hospital. Pt states that she had spoken with Dr. Nelson while in the hospital, and she is wondering if she should be scheduling any follow-up appts with him. Please call back to discuss.    Action Taken: Message routed to:  [facility] Colon/Rectal    Travel Screening: Not Applicable

## 2022-05-10 NOTE — PROGRESS NOTES
[facility]    OUTPATIENT Physical Therapy ORTHOPEDIC EVALUATION  PLAN OF TREATMENT FOR OUTPATIENT REHABILITATION  (COMPLETE FOR INITIAL CLAIMS ONLY)  Patient's Last Name, First Name, M.I.  YOB: 1965  Demetria Goodrich    Provider s Name:  [facility]   Medical Record No.  [id number]   Start of Care Date:  05/10/22   Onset Date:   04/13/22   Type:     _X__PT   ___OT Medical Diagnosis:    Encounter Diagnosis   Name Primary?    Chronic midline low back pain without sciatica         Treatment Diagnosis:  LBP w/o sciatica        Goals:     05/10/22 0500   Body Part   Goals listed below are for back pain   Goal #1   Goal #1 standing   Previous Functional Level No restrictions   Current Functional Level Minutes patient can stand   Performance level 5' 6/10   STG Target Performance Minutes patient will be able to stand   Performance level 5' 3/10   Rationale for housekeeping tasks such as vacuuming, bed making, mowing, gardening;for personal hygiene;for meal preparation;for safe household ambulation   Due date 06/10/22   LTG Target Performance Minutes patient will be able to stand   Performance Level 10' 2/10   Rationale for housekeeping tasks such as vacuuming, bed making, mowing;for personal hygiene;for meal preparation;for safe household ambulation   Due date 08/07/22       Therapy Frequency:  every 2 weeks  Predicted Duration of Therapy Intervention:  12 weeks    Bret Clements, PT                 I CERTIFY THE NEED FOR THESE SERVICES FURNISHED UNDER        THIS PLAN OF TREATMENT AND WHILE UNDER MY CARE     (Physician attestation of this document indicates review and certification of the therapy plan).                     Certification Date From:  05/10/22   Certification Date To:  08/07/22    Referring Provider:  Conner Olmos  Assessment        See Epic Evaluation SOC Date: 05/10/22

## 2022-05-10 NOTE — PROGRESS NOTES
Physical Therapy Initial Evaluation  Subjective:  The history is provided by the patient.   Patient Health History  Demetria Goodrich being seen for back pain, LE weakness .     Problem began: 4/13/2022.   Problem occurred: unknown    Pain is reported as 3/10 on pain scale.  General health as reported by patient is poor.  Pertinent medical history includes: cancer, mental illness and heart problems.   Red flags:  Non-healing wounds, significant weakness and unexplained weight loss.         Current medications:  Pain medication.                         Therapist Generated HPI Evaluation  Problem details: Pt currently is dealing with recent cancer dx w/ bilat LE edema, and LBP has become a problem. She describes varying pains in locations and intensity thru her hips, LEs, and LB areas. She is ambulatory today but has her feet ankles poorly wrapped w/o shoes and shows seepage thru the wraps, both ankles/LEs are severely edematous. She requires assistance in transferring to the table due to her heavy LEs.  .         Type of problem:  Lumbar.    This is a new condition.  Condition occurred with:  Bending.  Where condition occurred: at home.  Patient reports pain:  Lower lumbar spine, lumbar spine right and lumbar spine left.  Pain is described as aching and stabbing   Pain radiates to:  No radiation. Pain is the same all the time.  Since onset symptoms are gradually worsening.  Associated symptoms:  Loss of motion/stiffness, edema, loss of motion and loss of strength. Symptoms are exacerbated by bending, sitting and standing  and relieved by rest.      Barriers include:  None as reported by patient.                        Objective:  System         Lumbar/SI Evaluation  ROM:    AROM Lumbar:   Flexion:          Min loss   Ext:                    Mod loss standing    Side Bend:        Left:     Right:   Rotation:           Left:  Min loss     Right:  Min loss   Side Glide:        Left:     Right:           Lumbar Myotomes:   Lumbar myotomes: weakened due to edema             Lumbar DTR's:  not assessed      Cord Signs:  not assessed    Lumbar Dermtomes:  not assessed                Neural Tension/Mobility:  Lumbar:  Normal        Lumbar Palpation:  normal            SI joint/Sacrum:    Symetrical, no complaints                                                        General     ROS    Assessment/Plan:    Patient is a 57 year old female with lumbar complaints.    Patient has the following significant findings with corresponding treatment plan.                Diagnosis 1:  Back pain w/o sciatica   Pain -  self management and home program  Decreased ROM/flexibility - manual therapy, therapeutic exercise and home program  Decreased strength - therapeutic exercise, therapeutic activities and home program  Impaired muscle performance - neuro re-education and home program  Decreased function - therapeutic activities and home program    Therapy Evaluation Codes:   1) History comprised of:   Personal factors that impact the plan of care:      Coping style, Overall behavior pattern and Past/current experiences.    Comorbidity factors that impact the plan of care are:      Cancer, Mental illness, Weakness and edema.     Medications impacting care: Pain.  2) Examination of Body Systems comprised of:   Body structures and functions that impact the plan of care:      Lumbar spine.   Activity limitations that impact the plan of care are:      Bending, Lifting, Squatting/kneeling, Standing and Walking.  3) Clinical presentation characteristics are:   Stable/Uncomplicated.  4) Decision-Making    Low complexity using standardized patient assessment instrument and/or measureable assessment of functional outcome.  Cumulative Therapy Evaluation is: Low complexity.    Previous and current functional limitations:  (See Goal Flow Sheet for this information)    Short term and Long term goals: (See Goal Flow Sheet for this information)     Communication ability:   Patient appears to be able to clearly communicate and understand verbal and written communication and follow directions correctly.  Treatment Explanation - The following has been discussed with the patient:   RX ordered/plan of care  Anticipated outcomes  Possible risks and side effects  This patient would benefit from PT intervention to resume normal activities.   Rehab potential is good.    Frequency:  1 X week, once daily every other week  Duration:  for 12 weeks  Discharge Plan:  Achieve all LTG.  Independent in home treatment program.  Reach maximal therapeutic benefit.    Please refer to the daily flowsheet for treatment today, total treatment time and time spent performing 1:1 timed codes.

## 2022-05-10 NOTE — TELEPHONE ENCOUNTER
"Patient was in to see Teresa Lester on 5/5/2022 for Jr LE swelling but was sent to the ED due to hgb at 4.8.  Patient stated that she never got her BLE edema addressed.  The swelling goes all the way up to the thighs and are getting worse.    She has a follow-up with JOSE Hall on 5/12/2022 and is wondering if PCP can prescribe a diuretic in the interim, \"otherwise there is no way I am getting into a car to get to the appointment\"  Patient also stated that her oncologist, Dr. Saunders wants some labs done and had left a message to request that JOSE Hall draw the labs at the 5/12/2022 appointment    Kavin España RN  [facility]    "

## 2022-05-11 NOTE — TELEPHONE ENCOUNTER
"Patient notified of Provider's message as written.  Patient verbalized understanding.    Legs are also weeping and skin is stretching \"has scratch marks but not from me scratching  Patient will keep her OV for tomorrow as she will need labs done and legs assessed    Kavin España RN  [facility]    "

## 2022-05-11 NOTE — TELEPHONE ENCOUNTER
Let Patient know she will have to use gravity and lay down and elevate the legs higher than her hips.  Her potassium was so low after diuretic therapy that we need to do labs first. We can conduct her visit virtually if she would like.   Tell her I haven't received orders from Oncology as of yet.  Lara GARCIA

## 2022-05-11 NOTE — TELEPHONE ENCOUNTER
----- Message from Cl Saunders MD sent at 5/10/2022  2:46 PM CDT -----  Karlos Pireto,    I am one of the GI med oncs, saw Ms. Goodrich inpatient and will become her med onc outpatient going forward. As you may know, she has a rectal tumor with lots of liver mets. This needs urgent chemo (in next 2-3 weeks).    We will meet virtually on 5/16.  We will try to get a port in next 1-2 weeks.    She told me you might see her this week for a post-discharge visit?  If yes, can you please get a CBC, CMP, direct bili, CEA, INR and a pre-op COVID test?    That way, we will have baseline labs to get port scheduled and see if she needs urgent medical attention due to high LFTs or low Hb (I hope not).    If you need to reach me for anything or with urgent lab results, just call [phone] (my cell).    Thanks so much. Please let Dominique and I know if we can get this bloodwork with you this week. If not, we will get it at a separate visit later.    Cl

## 2022-05-11 NOTE — TELEPHONE ENCOUNTER
I did receive the message from Oncology and placed orders.    Routing comment      Left message on answering machine for patient to call back to the nurse at [phone].    Labs placed. See provider recommendations below for RAMO Steinberg RN  [facility]

## 2022-05-12 NOTE — PROGRESS NOTES
Assessment & Plan     Peripheral edema  - Leg elevation  - Compression stockings as recommended.  - Consider Lasix restart after lab results reviewed.    Rectal cancer (H)  - CEA  - INR  - Asymptomatic COVID-19 Virus (Coronavirus) by PCR Nose  - Bilirubin direct  - Comprehensive metabolic panel  - CBC with Platelets & Differential    Iron deficiency anemia due to chronic blood loss  - CBC with Platelets & Differential    Acute kidney failure with tubular necrosis (H)  - Comprehensive metabolic panel    Need for vaccination  - Pneumococcal vaccine 23 valent PPSV23  (Pneumovax) [26272]    Review of the result(s) of each unique test - CBC and BMP  Ordering of each unique test  Prescription drug management  No LOS data to display   Time spent doing chart review, history and exam, documentation and further activities per the note       MEDICATIONS:  Continue current medications without change  FURTHER TESTING:       - See ordered labs    Return in about 3 days (around 5/15/2022) for Oncology consultation.    Conner Sandoval PA-C  [facility] ALEXX Augustin is a 57 year old who presents for the following health issues  accompanied by her self.    [facility]       Hospital Follow-up Visit:    Hospital/Nursing Home/IP Rehab Facility: [facility]  Date of Admission: 05/05/2022  Date of Discharge: 05/07/2022  Reason(s) for Admission: Low Hemoglobin      Was your hospitalization related to COVID-19? No   Problems taking medications regularly:  None  Medication changes since discharge: None  Problems adhering to non-medication therapy:  None    Summary of hospitalization:  [facility] discharge summary reviewed  Diagnostic Tests/Treatments reviewed.  Follow up needed: oncology follow up and picc line placement  Other Healthcare Providers Involved in Patient s Care:         Specialist appointment - Oncology in 3 days.   Update since  "discharge: improved.       Post Discharge Medication Reconciliation: discharge medications reconciled, continue medications without change.  Plan of care communicated with patient              Patient notes that she is feeling ok but her legs have started oozing.  She is unable to put on the previously ordered compression stockings and will start with OTC ordered.      Review of Systems   Constitutional, HEENT, cardiovascular, pulmonary, GI, , musculoskeletal, neuro, skin, endocrine and psych systems are negative, except as otherwise noted.      Objective    BP 98/62   Pulse 94   Temp 97.9  F (36.6  C) (Oral)   Ht 1.651 m (5' 5\")   Wt 81.2 kg (179 lb)   LMP 11/30/2012   SpO2 100%   BMI 29.79 kg/m    Body mass index is 29.79 kg/m .  Physical Exam   GENERAL: healthy, alert and no distress  EYES: sl icterus  RESP: lungs clear to auscultation - no rales, rhonchi or wheezes  CV: regular rates and rhythm, normal S1 S2, no S3 or S4, no murmur, click or rub and peripheral pulses strong  MS: Marked LE edema to above the knees  SKIN: plasma oozing noted with early ulcerations.          "

## 2022-05-15 NOTE — PROGRESS NOTES
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 Demetria Goodrich is a 57 year old woman.     Previously quite healthy.   No major physical medical conditions.     Since Jan 2022, more fatigue and 30-40 lb weight loss.  Went from ECOG 0-- fully functional, maybe ECOG 2  Minimal appetite  Smaller stool caliber-- maybe some dark stools     Workup revealed a de-frida advanced/ metastatic rectal adenocarcinoma (moderately differentiated, mismatch repair intact) with mets to liver and likely lungs.  She was due to meet with Dr. Nelson and Dr. Pollack of surg onc and med onc this week, but came to ED with severe anemia after coming to PCP with pedal edema.     Workup so far:  Colonoscopy with Dr. Everette Fischer on 4/22/2022: very obstructive tumor in proximal rectum-- biopsied, could not pass through   CEA 3,800     CT CAP and MR rectum on 5/2/2022  Multiple liver and sub-cm lung lesions-- liver lesions quite confluent  Upper rectal T3Nx tumor     Came to ED on 5/5/2022 with weakness. Hb 4.8, albumin 1.9, WBC 28, Cr 1 and lower extremity edema.   In ICU for treating sepsis and blood-loss anemia.  Stabilized, left AMA on 5/7/2022 since nothing was being done acutely.     At PCP visit on 5/12, quite weak, LE edema, Creat 1.1, bili 3.4, WBC 25.  CEA 6400       Interval history:  Doing ok  Appetite ok, eating, drinking ok  Peeing, having BMs-- pee is sometimes dark  No falls but does have  unsteadiness due to L>R LE swelling and seepage  No bleeding  Able to walk around house     Review of Systems: 14 point ROS negative other than the symptoms noted above in the HPI.     Past medical history:  Hypercholesterolemia, bipolar 1, nicotine and alcohol use, varicose veins     Past surgical history:  Vein stripping     Social history:   Lives with boyfriend, Dagoberto, of 22 years, In [place]. Dagoberto cell: [phone].  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        Family history:  Father--  in his 40s from brain tumor  Mother- leukemia  2 siblings-- colon polyps?  No biological children     Allergies:   NKDA     Outpatient medications:   Statin  Aripirazole     Physical examination:  General: patient appears well in no acute distress, alert and oriented, speech clear and fluid  Skin: no visualized rash or lesions on visualized skin  Resp: Appears to be breathing comfortably without accessory muscle usage, speaking in full sentences, no audible wheezes or cough.  Psych: Coherent speech, normal rate and volume, able to articulate logical thoughts, able to abstract reason, no tangential thoughts, no hallucinations or delusions.       Lab data:  I have personally reviewed the following lab data: CBC CMP CEA  CEA 3,800  WBC 28, 80% neutrophil  Hb 4.8  Albumin 1.9  Cr 1  Lactate 10  ALT/ AST 60/ 600  Bili 0.9     Radiology data:  I have personally reviewed the images of / or the following radiology data:    CT CAP and MR rectum on 2022  Multiple liver and sub-cm lung lesions  Upper rectal T3Nx tumor     Pathology and other data:  I have personally reviewed and interpreted the following data:  Rectal biopsy with moderately differentiated adenocarcinoma        Assessment and Plan:  Ms. Demetria Goodrich is a 57 year old woman. Previously  healthy.  In late April 2022, fatigue and weight loss led to diagnosis of de-frida advanced/ metastatic upper rectal adenocarcinoma (moderately differentiated, mismatch repair intact) with mets to liver (diffuse, confluent mets) and likely lungs.     Came to ED in early May with sepsis, anemia, SERENITY, biliary obstruction. Left after 2 days.  Discussed at [facility] tumor board on 5/9 with plan for chemo. Her primary tumor is very obstructive on c-scope but she is fortunately having BMs. Surg onc and rad onc both saw her in hospital and did not want to intervene appropriately.   Labs with PCP on 5/12 with bili up to 3.6 and CEA almost doubling from >3000 to >6000.      She needs chemo and fast.   I fear she is already very sick.  She could enter liver failure quickly.  I think high WBC is from stress > infection.  Even if due to infection, she needs chemo.  She has very high crash potential.  We discussed all this today.     Port on 5/18-- already set up  FOLFOX C1D1 on 5/19-- give regardless of labs-- we cannot use ashlie with almost obstructing tumor and recent bleed, and EGFRi without knowing molecular markers. Irinotecan is dangerous with bili>2.   Very close onc follow-up-- will set up to see me on 5/23 in-person.    Also set up for dose 2 chemo on 6/2 after meeting with Kathy Escalante PA-C on 6/1.    Admit after chemo started if needed-- but would prefer to start chemo (considered admitting and doing all this inpatient but she wants to try this outpatient as much as possible).  She has ''seeping'' pedal edema but had neg DVT US in hospital-- v high risk of clots but also bleeds from primary-- we need to push ahead with chemo    Will send Apolinar for NGS         I am concerned about her -- acutely and subactutely and chronically.     75 minutes spent on the date of the encounter doing chart review, review of test results, interpretation of tests, patient visit, documentation, discussion with other provider(s) and discussion with  .     Cl Saunders M.D.   of Medicine  Division of Hematology, Oncology and Transplantation  [facility]

## 2022-05-16 NOTE — LETTER
5/16/2022         RE: Demetria Goodrich  [address] [address]        Dear Colleague,    Thank you for referring your patient, Demetria Goodrich, to the [facility]. Please see a copy of my visit note below.      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 Demetria Goodrich is a 57 year old woman.     Previously quite healthy.   No major physical medical conditions.     Since Jan 2022, more fatigue and 30-40 lb weight loss.  Went from ECOG 0-- fully functional, maybe ECOG 2  Minimal appetite  Smaller stool caliber-- maybe some dark stools     Workup revealed a de-frida advanced/ metastatic rectal adenocarcinoma (moderately differentiated, mismatch repair intact) with mets to liver and likely lungs.  She was due to meet with Dr. Nelson and Dr. Pollack of surg onc and med onc this week, but came to ED with severe anemia after coming to PCP with pedal edema.     Workup so far:  Colonoscopy with Dr. Everette Fischer on 4/22/2022: very obstructive tumor in proximal rectum-- biopsied, could not pass through   CEA 3,800     CT CAP and MR rectum on 5/2/2022  Multiple liver and sub-cm lung lesions-- liver lesions quite confluent  Upper rectal T3Nx tumor     Came to ED on 5/5/2022 with weakness. Hb 4.8, albumin 1.9, WBC 28, Cr 1 and lower extremity edema.   In ICU for treating sepsis and blood-loss anemia.  Stabilized, left AMA on 5/7/2022 since  nothing was being done acutely.     At PCP visit on , quite weak, LE edema, Creat 1.1, bili 3.4, WBC 25.  CEA 6400       Interval history:  Doing ok  Appetite ok, eating, drinking ok  Peeing, having BMs-- pee is sometimes dark  No falls but does have unsteadiness due to L>R LE swelling and seepage  No bleeding  Able to walk around house     Review of Systems: 14 point ROS negative other than the symptoms noted above in the HPI.     Past medical history:  Hypercholesterolemia, bipolar 1, nicotine and alcohol use, varicose veins     Past surgical history:  Vein stripping     Social history:   Lives with boyfriend, Dagoberto, of 22 years, In [place]. Dagoberto cell: [phone].  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        Family history:  Father--  in his 40s from brain tumor  Mother- leukemia  2 siblings-- colon polyps?  No biological children     Allergies:   NKDA     Outpatient medications:   Statin  Aripirazole     Physical examination:  General: patient appears well in no acute distress, alert and oriented, speech clear and fluid  Skin: no visualized rash or lesions on visualized skin  Resp: Appears to be breathing comfortably without accessory muscle usage, speaking in full sentences, no audible wheezes or cough.  Psych: Coherent speech, normal rate and volume, able to articulate logical thoughts, able to abstract reason, no tangential thoughts, no hallucinations or delusions.       Lab data:  I have personally reviewed the following lab data: CBC CMP CEA  CEA 3,800  WBC 28, 80% neutrophil  Hb 4.8  Albumin 1.9  Cr 1  Lactate 10  ALT/ AST 60/ 600  Bili 0.9     Radiology data:  I have personally reviewed the images of / or the following radiology data:    CT CAP and MR rectum on 2022  Multiple liver and sub-cm lung lesions  Upper  rectal T3Nx tumor     Pathology and other data:  I have personally reviewed and interpreted the following data:  Rectal biopsy with moderately differentiated adenocarcinoma        Assessment and Plan:  Ms. Demetria Goodrich is a 57 year old woman. Previously healthy.  In late April 2022, fatigue and weight loss led to diagnosis of de-frida advanced/ metastatic upper rectal adenocarcinoma (moderately differentiated, mismatch repair intact) with mets to liver (diffuse, confluent mets) and likely lungs.     Came to ED in early May with sepsis, anemia, SERENITY, biliary obstruction. Left after 2 days.  Discussed at [facility] tumor board on 5/9 with plan for chemo. Her primary tumor is very obstructive on c-scope but she is fortunately having BMs. Surg onc and rad onc both saw her in hospital and did not want to intervene appropriately.   Labs with PCP on 5/12 with bili up to 3.6 and CEA almost doubling from >3000 to >6000.      She needs chemo and fast.   I fear she is already very sick.  She could enter liver failure quickly.  I think high WBC is from stress > infection.  Even if due to infection, she needs chemo.  She has very high crash potential.  We discussed all this today.     Port on 5/18-- already set up  FOLFOX C1D1 on 5/19-- give regardless of labs-- we cannot use ashlie with almost obstructing tumor and recent bleed, and EGFRi without knowing molecular markers. Irinotecan is dangerous with bili>2.   Very close onc follow-up-- will set up to see me on 5/23 in-person.    Also set up for dose 2 chemo on 6/2 after meeting with Kathy Escalante PA-C on 6/1.    Admit after chemo started if needed-- but would prefer to start chemo (considered admitting and doing all this inpatient but she wants to try this outpatient as much as possible).  She has ''seeping'' pedal edema but had neg DVT US in hospital-- v high risk of clots but also bleeds from primary-- we need to push ahead with chemo    Will send Apolinar for NGS         I am  concerned about her -- acutely and subactutely and chronically.     75 minutes spent on the date of the encounter doing chart review, review of test results, interpretation of tests, patient visit, documentation, discussion with other provider(s) and discussion with family.       Cl Saunders M.D.   of Medicine  Division of Hematology, Oncology and Transplantation  [facility]

## 2022-05-16 NOTE — PROGRESS NOTES
[facility]: Cancer Care Plan of Care Education Note                                    Discussion with Patient:                                                      Phone call to Demetria to discuss chemotherapy and resources available at the [facility]. Previously sent mychart information from [facility] printouts on 5FU and Oxaliplatin. Reviewed administration, side effects (including myelosuppression, nausea/vomiting, diarrhea/constipation, hair loss, mouth sores) and ongoing symptom management by APPs in clinic. Provided phone numbers for triage and after hours care. Highlighted steps to expect when getting chemotherapy (check in, labs, pre- meds, infusion), Discussed that chemo treatment may be delayed a day or two due to blood counts, infusion schedule or patient's need to modify. Included a one page resource of symptoms and when to contact the Cancer Clinic with questions or concerns.      Discussed utilizing MyChart to assist in managing appointments and asking future questions of health care team. Requested it not be used for symptom and side effect management. Instructed Demetria to call our Nurse Triage Team. Contact information provided.     Educated on ports. Provided descriptive explanation of placement, access, when to use and ongoing maintenance of port. Discussed risks of infection and bloods clots. Previously provided Port information for Demetria to read at home via my chart.       Answered all Demetria's questions to her stated satisfaction.               Goals        General     symptom management (pt-stated)      Notes - Note created  5/16/2022  1:46 PM by Dominique Ann RN     Patient would like to manage symptoms at home.  She will use the triage team for support.  Will not use mychart for reporting symptoms.                Assessment:                                                      Assessment completed with:: Patient    Plan of Care Education   Yearly learning  assessment completed?: Yes (see Education tab)  Diagnosis:: Rectal Cancer  Does patient understand diagnosis?: Yes  Tx plan/regimen:: FOLFOX  Does patient understand treatment plan/regimen?: Yes  Preparing for treatment:: Reviewed treatment preparation information with patient (vascular access, day of chemo, visitor policy, what to bring, etc.)  Vascular access:: Port (Port Placement 5/18)  Side effect education:: Diarrhea/Constipation, Nausea/Vomiting, Neuropathy, Fatigue, Hair loss, Infection, Skin changes, Lab value monitoring (anemia, neutropenia, thrombocytopenia), Mouth sores, Mylosuppression  Safety/self care at home reviewed with patient:: Yes  Plan of Care:: Treatment schedule  When to call provider:: Bleeding, Increased shortness of breath, New/worsening pain, Shaking chills, Temperature >100.4F, Uncontrolled diarrhea/constipation, Uncontrolled nausea/vomiting  Reasons for deferring treatment reviewed with patient:: Yes    Evaluation of Learning  Patient Education Provided: Yes  Readiness:: Acceptance  Method:: Booklet/Handout, Explanation  Response:: Verbalizes understanding        Intervention/Education provided during outreach:                                                       Follow up call in 1-2 weeks after first infusion  Patient to follow up as scheduled at next appointment.  Patient to call/Someecardst message with updates.  Verified that patient has the clinic and triage phone numbers.    Signature:  LAKISHA TamayoN, RN  RN Care Coordinator  [facility]        Apolinar testing requisition submitted on pathology specimen [id number] as requested by Dr Saunders.

## 2022-05-16 NOTE — PROGRESS NOTES
Demetria is a 57 year old who is being evaluated via a billable video visit.      Pt has swollen legs, thighs.    How would you like to obtain your AVS? MyChart  If the video visit is dropped, the invitation should be resent by: Send to e-mail at: [email or web address]  Will anyone else be joining your video visit? No    Start time: 10:35  End time: 10:51  Patient location: Home  Clinician location: [facility]  Platform: [employer] VF

## 2022-05-17 PROBLEM — E87.5 HYPERKALEMIA: Status: ACTIVE | Noted: 2022-01-01

## 2022-05-17 PROBLEM — N17.9 ACUTE KIDNEY INJURY (H): Status: ACTIVE | Noted: 2022-01-01

## 2022-05-17 PROBLEM — R06.02 SHORTNESS OF BREATH: Status: ACTIVE | Noted: 2022-01-01

## 2022-05-17 PROBLEM — C78.00 RECTAL ADENOCARCINOMA METASTATIC TO LUNG (H): Status: ACTIVE | Noted: 2022-01-01

## 2022-05-17 PROBLEM — C20 RECTAL ADENOCARCINOMA METASTATIC TO LIVER (H): Status: ACTIVE | Noted: 2022-01-01

## 2022-05-17 PROBLEM — C78.7 RECTAL ADENOCARCINOMA METASTATIC TO LIVER (H): Status: ACTIVE | Noted: 2022-01-01

## 2022-05-17 PROBLEM — R60.0 PERIPHERAL EDEMA: Status: ACTIVE | Noted: 2022-01-01

## 2022-05-17 PROBLEM — C20 RECTAL ADENOCARCINOMA METASTATIC TO LUNG (H): Status: ACTIVE | Noted: 2022-01-01

## 2022-05-17 NOTE — CONSULTS
Hepatology Consultation    Demetria Goodrich   MRN# [id number]     Age: 57 year old YOB: 1965       Referring provider: Barb Savage  Attending Hepatologist: Dr. Paige Roth  Consult requested for: elevated liver tests       Assessment and Recommendation:   Assessment:   Ms. Goodrich is a 57-year-old with a recent diagnosis of T3 rectal adenocarcinoma with mets to liver and lung, not yet on treatment, bipolar disorder, recent anemia who was admitted with complaints of generalized pain, lower extremity edema limiting mobility and noted to have SERENITY, further elevation in transaminases.       Recommendations:   1. Elevated liver tests  2. Liver lesions, mets from rectal CA  3. Rhabdomyolysis  - significant increase in AST with wide ratio between AST and ALT. CK elevated to 2401. She has had decrease in mobility due to lower extremity edema likely leading to rhabdo. Lactic acid and creatinine elevated. Phosphorus not low,  elevated with SERENITY.   - She also has direct hyperbilirubinemia and alk phos related to significant tumor burden in liver. No evidence of cholangitis, she does have mild compression on intrahepatic biliary ducts. She does have some pruritus, likely related to cholestasis. May add ursodiol 300 mg BID  - With multiorgan involvement with metastatic disease, if not a candidate for systemic chemo, will need to address goals of care.     Plan of care discussed with Dr. Paige Roth    Thank you for the opportunity to be involved in Demetria Goodrich care. Please call with any questions or concerns.     JANAY Crews, CNP  Inpatient Hepatology [facility]  Text link               History of Present Illness:   Demetria Goodrich is a 57 year old female with a recent diagnosis of T3 rectal adenocarcinoma with metastatic disease to liver and likely lungs, bipolar disorder, alcohol misuse, COVID (1/2022), KARL, HLD, anasarca and a recent admission with anemia (4.9) from 5/5-5/7 without overt  signs of GIB (left AMA) who was admitted with complaints of generalized pain, epecially to back and limited mobility due to lower extremity pain and edema. She had plans to start palliative chemotherapy soon per oncology. She was noted on admit to have SERENITY (creat 3.1), , ALT 84, Alk phos 1034 and t.bili of 3.9. She did have a peak alk phos of 1475 on 5/5 which improved following IVF given concern for bleeding and shock. Her bilirubin has steadily risen since 5/5 and predominately direct. She denies any fevers, nausea, vomiting, melena or hematochezia. She continues to have ~2 small BM's daily but has had limited appetite recently. Prior abdominal imaginag on 5/5 demonstrate innumerable masses in the liver with left hepatic lobe mild intrahepatic biliary dilation. She reports generalized body pain that started a few days ago as well as worsening pruritus primarily on her back. A CK was elevated.     She continues to have weight loss which is the main reason why she was initially seen by her PCP in 4/2022. She does have abdominal pain, especially to RUQ after palpation. She denies bloating, hematuria, rigors, acholic stools, or change in mentation. She notes that she is unable to walk due to pain in her lower extremities and heaviness with anasarca and has not been very mobile. She denies falls.              Past Medical History:     Past Medical History:   Diagnosis Date     ASCUS on Pap smear 2010     Cervical high risk HPV (human papillomavirus) test positive 4/1/2022 2005, 2010 ASCUS, neg HR HPV 2008, 2011, 2012 NIL paps 3/13/18 NIL pap, neg HPV 4/1/22 NIL pap, +HR HPV (not 16/18). Plan: cotest in 1 year     Chronic hypomanic disorder (H)      Other and unspecified alcohol dependence, unspecified drinking behavior      Rectal cancer (H) 5/5/2022     Varicose veins of lower extremities with other complications     both legs              Past Surgical History:     Past Surgical History:   Procedure  Laterality Date     COLONOSCOPY N/A 2022    Procedure: COLONOSCOPY, WITH POLYPECTOMY AND BIOPSY;  Surgeon: Everette iFscher MD;  Location: [facility] OR     COLONOSCOPY N/A 2022    Procedure: COLONOSCOPY, FLEXIBLE, WITH LESION REMOVAL USING SNARE;  Surgeon: Everette Fischer MD;  Location: [facility]     COLONOSCOPY WITH CO2 INSUFFLATION N/A 2022    Procedure: COLONOSCOPY, WITH CO2 INSUFFLATION;  Surgeon: Everette Fischer MD;  Location: [facility] OR     LIGATN/STRIP LONG & SHORT SAPHEN  +    both legs     PICC DOUBLE LUMEN PLACEMENT Right 2022    Right brachial -medial vein 0.45cm.Placement verified by [facility] 3CG.PICC okay to use.              Social History:     Social History     Tobacco Use     Smoking status: Current Every Day Smoker     Packs/day: 0.07     Years: 33.00     Pack years: 2.31     Types: Other, Cigarettes     Smokeless tobacco: Former User     Quit date: 3/13/2015     Tobacco comment: have Chantex prescription   Substance Use Topics     Alcohol use: Yes     Comment: 12 pack per week             Family History:   The I have reviewed this patient's family history and updated it with pertinent information if needed.  Family History   Problem Relation Age of Onset     Cancer Father         Brain tumor, age 32,  age 33     Other Cancer Father         Brain Cancer     Cancer - colorectal Mother         54     Other Cancer Mother         Blood Cancer     Leukemia Mother      Breast Cancer Mother      Colon Cancer Mother      Cancer - colorectal Maternal Uncle         56     Ovarian Cancer No family hx of                   Immunizations:     Immunization History   Administered Date(s) Administered     COVID-19,PF,Pfizer (12+ Yrs) 2021, 2021     COVID-19,PF,Pfizer 12+ Yrs ( and After) 2022     Influenza Quad, Recombinant, pf(RIV4) (Flublok) 2020, 2022     Influenza Vaccine IM > 6 months Valent IIV4 (Alfuria,Fluzone)  03/13/2018     Pneumococcal 23 valent 09/28/2012, 05/12/2022     TD (ADULT, 7+) 03/31/2005     TDAP Vaccine (Adacel) 09/28/2012     Tdap (Adacel,Boostrix) 01/01/2006, 11/14/2008     Zoster vaccine recombinant adjuvanted (SHINGRIX) 09/30/2020, 11/27/2020            Allergies:     Allergies   Allergen Reactions     No Known Drug Allergies              Medications:     Current Facility-Administered Medications   Medication     albuterol (PROVENTIL) neb solution 10 mg     ARIPiprazole (ABILIFY) tablet 5 mg     calcium gluconate 1 g in 50 mL sodium chloride intermittent infusion (premix)     dextrose 10% BOLUS     glucose gel 15-30 g    Or     dextrose 50 % injection 25-50 mL    Or     glucagon injection 1 mg     lidocaine (LMX4) cream     lidocaine 1 % 0.1-1 mL     melatonin tablet 1 mg     senna-docusate (SENOKOT-S/PERICOLACE) 8.6-50 MG per tablet 1 tablet    Or     senna-docusate (SENOKOT-S/PERICOLACE) 8.6-50 MG per tablet 2 tablet     sodium chloride (PF) 0.9% PF flush 3 mL     sodium chloride (PF) 0.9% PF flush 3 mL     Current Outpatient Medications   Medication     ARIPiprazole (ABILIFY) 5 MG tablet     [START ON 5/18/2022] dexamethasone (DECADRON) 4 MG tablet     folic acid (FOLVITE) 1 MG tablet     multivitamin w/minerals (THERA-VIT-M) tablet     [START ON 5/18/2022] ondansetron (ZOFRAN) 8 MG tablet     pantoprazole (PROTONIX) 40 MG EC tablet     [START ON 5/18/2022] prochlorperazine (COMPAZINE) 10 MG tablet     simvastatin (ZOCOR) 40 MG tablet     thiamine (B-1) 100 MG tablet             Review of Systems:    ROS: 10 point ROS neg other than the symptoms noted above in the HPI.          Physical Exam:   Blood pressure 98/68, pulse 94, temperature 97.7  F (36.5  C), temperature source Oral, resp. rate 16, last menstrual period 11/30/2012, SpO2 100 %. There is no height or weight on file to calculate BMI.    General: In no acute distress, no facial muscle wasting  Neuro: AOx3, No asterixis  HEENT: PERRL mild  scleral icterus, Nooral lesions  Lymph:  Nocervical lymphadenoapthy  CV:  Skin warm and dry  Lungs:  Respirations even and nonlabored on room air  Abd: Mildly distended, semifirm, hypoactive BS  Extrem: +3 lower peripehral edema  Skin: mild jaundice  Psych: agitated         Data:     Lab Results   Component Value Date    WBC 19.4 05/17/2022    WBC 8.2 09/28/2012     Lab Results   Component Value Date    RBC 2.99 05/17/2022    RBC 3.89 09/28/2012     Lab Results   Component Value Date    HGB 8.4 05/17/2022    HGB 12.3 09/28/2012     Lab Results   Component Value Date    HCT 28.0 05/17/2022    HCT 36.6 09/28/2012     No components found for: MCT  Lab Results   Component Value Date    MCV 94 05/17/2022    MCV 94 09/28/2012     Lab Results   Component Value Date    MCH 28.1 05/17/2022    MCH 31.6 09/28/2012     Lab Results   Component Value Date    MCHC 30.0 05/17/2022    MCHC 33.6 09/28/2012     Lab Results   Component Value Date    RDW 23.4 05/17/2022    RDW 13.5 09/28/2012     Lab Results   Component Value Date     05/17/2022     09/28/2012       Last Basic Metabolic Panel:  Lab Results   Component Value Date     05/17/2022     07/23/2020      Lab Results   Component Value Date    POTASSIUM 5.8 05/17/2022    POTASSIUM 4.6 07/23/2020     Lab Results   Component Value Date    CHLORIDE 102 05/17/2022    CHLORIDE 105 07/23/2020     Lab Results   Component Value Date    CONNIE 6.9 05/17/2022    CONNIE 9.5 07/23/2020     Lab Results   Component Value Date    CO2 11 05/17/2022    CO2 28 07/23/2020     Lab Results   Component Value Date    BUN 45 05/17/2022    BUN 11 07/23/2020     Lab Results   Component Value Date    CR 3.18 05/17/2022    CR 0.94 07/23/2020     Lab Results   Component Value Date     05/17/2022     05/17/2022    GLC 94 07/30/2020       Liver Function Studies -   Recent Labs   Lab Test 05/17/22  0643   PROTTOTAL 6.8   ALBUMIN 1.7*   BILITOTAL 3.8*   ALKPHOS 1,033*   *    ALT 88*       Lab Results   Component Value Date    INR 2.64 05/17/2022              Previous Endoscopy:     No results found for this or any previous visit.  Results for orders placed or performed during the hospital encounter of 04/22/22   COLONOSCOPY   Result Value Ref Range    COLONOSCOPY       [facility]  Endoscopy Department-[place]  _______________________________________________________________________________  Patient Name: Demetria Goodrich          Procedure Date: 4/22/2022 10:49 AM  MRN: [id number]                       YOB: 1965  Admit Type: Outpatient                Age: 56  Gender: Female                        Note Status: Finalized  Attending MD: TYREL KRAUSE MD  Instrument Name: GIF-   [id number]  _______________________________________________________________________________     Procedure:                Colonoscopy  Indications:              Screening for colorectal malignant neoplasm  Providers:                TYREL KRAUSE MD  Referring MD:             KELLY SANCHEZ MD  Medicines:                Fentanyl 100 micrograms IV, Midazolam 4 mg IV  Complications:            No immediate complications.  _________________________________________________________ ______________________  Procedure:                Pre-Anesthesia Assessment:                            - Prior to the procedure, a History and Physical                             was performed, and patient medications and                             allergies were reviewed. The patient is competent.                             The risks and benefits of the procedure and the                             sedation options and risks were discussed with the                             patient. All questions were answered and informed                             consent was obtained. Patient identification and                              proposed procedure were verified by the physician                             and the nurse in the pre-procedure area in the                             endoscopy suite. Mental Status Examination: alert                             and oriented. Airway Examination: normal                             oropharyngeal airway and neck mobility and                              Mallampati Class II (the uvula but not tonsillar                             pillars visualized). Respiratory Examination: clear                             to auscultation. CV Examination: normal.                             Prophylactic Antibiotics: The patient does not                             require prophylactic antibiotics. Prior                             Anticoagulants: The patient has taken no                             anticoagulant or antiplatelet agents. ASA Grade                             Assessment: II - A patient with mild systemic                             disease. After reviewing the risks and benefits,                             the patient was deemed in satisfactory condition to                             undergo the procedure. The anesthesia plan was to                             use moderate sedation / analgesia (conscious                             sedation). Immediately prior to administration of                             medications, t he patient was re-assessed for                             adequacy to receive sedatives. The heart rate,                             respiratory rate, oxygen saturations, blood                             pressure, adequacy of pulmonary ventilation, and                             response to care were monitored throughout the                             procedure. The physical status of the patient was                             re-assessed after the procedure.                            After obtaining informed consent, the colonoscope                             was passed  under direct vision. Throughout the                             procedure, the patient's blood pressure, pulse, and                             oxygen saturations were monitored continuously. The                             was introduced through the anus with the intention                             of advancing to the cecum. The scope was advanced                             to the rectum before the procedure  was aborted.                             Medications were given. The was introduced through                             the anus and advanced to the rectum to examine a                             mass. This was the intended extent. The colonoscopy                             was performed without difficulty. The patient                             tolerated the procedure well. The quality of the                             bowel preparation was evaluated using the BBPS                             (Randolph Bowel Preparation Scale) with scores of:                             Left Colon = 3 (entire mucosa seen well with no                             residual staining, small fragments of stool or                             opaque liquid). The total BBPS score equals 3. The                             quality of the bowel preparation was good.                                                                                   Findings:       The perianal and digital rectal examinations wer e normal.       A 5 mm polyp was found at 5 cm proximal to the anus. The polyp was        sessile. The polyp was removed with a cold snare. Resection and        retrieval were complete. Verification of patient identification for the        specimen was done. Estimated blood loss was minimal.       A fungating, friable completely obstructing large mass was found in the        proximal rectum from approximaly 12cm proximal to the anal verge and        extending to at least 16cm proximalt to the anal verge which was the         maximal extent possible of the examination. The mass was circumferential        (involving 100% of the lumen circumference). In addition, its luminal        diameter was reduced to approximately six mm. Neither the pediatric        colonoscope nor the adult gastroscope could navigate through this area        due to the tumor. Biopsies were taken with a cold forceps for histology.        Verification of patient identification for the specimen was done.        Estimat ed blood loss was minimal.       The retroflexed view of the distal rectum and anal verge was normal and        showed no additional anal or rectal abnormalities.                                                                                   Moderate Sedation:       Moderate (conscious) sedation was administered by the endoscopy nurse        and supervised by the endoscopist. The patient's oxygen saturation,        heart rate, blood pressure and response to care were monitored. Total        physician intraservice time was 25 minutes.  Impression:               - Likely malignant completely obstructing tumor in                             the proximal rectum extending from 12cm proximal to                             the anal verge to at least 16cm proximal to the                             anal verge at which point the lumen is sufficiently                             narrowed so that neither pediatric colonoscope, nor                             EGD scope could pass through. Biops ied.                            - One 5 mm polyp at 5 cm proximal to the anus,                             removed with a cold snare. Resected and retrieved.                            - The distal rectum and anal verge are normal on                             retroflexion view.  Recommendation:           - Discharge patient to home (ambulatory).                            - Patient has a contact number available for                             emergencies. The signs and  symptoms of potential                             delayed complications were discussed with the                             patient. Return to normal activities tomorrow.                             Written discharge instructions were provided to the                             patient.                            - Await pathology results.                            - Check CEA, CT Chest/ABD/[place] with contrast for                             staging evaluation. Refer to medical oncology and                              colorectal surgery.                            - If surgery is pursued, another colonoscopy will                             be required in the future to clear the remainder of                             the colon as only the rectum was visualized today.                            - Mass was friable so passing of small blood clots                             in the post procedure period may be observed.                                                                                       _____________________________  TYREL KRAUSE MD  4/22/2022 11:47:22 AM  I was physically present for the entire viewing portion of the exam.TYREL KRAUSE MD  Number of Addenda: 0    Note Initiated On: 4/22/2022 10:49 AM  Scope In:  Scope Out:         IMAGING:      XR Chest Port 1 View    Narrative  EXAM: XR CHEST PORT 1 VIEW  LOCATION: [facility]  DATE/TIME: 5/17/2022 3:16 AM    INDICATION: shortness of breath  COMPARISON: None.    Impression  IMPRESSION: Cardiomediastinal silhouette within normal limits. No focal consolidation or pleural effusion.      CT chest/abd w 5/2/22  IMPRESSION:   1. The liver is enlarged by innumerable hepatic metastases. Prominent  and mildly enlarged heterogeneous peg hepatis lymph nodes are  suspicious for metastatic disease as well.  2. Multiple bilateral metastatic pulmonary nodules measuring up to 9  mm.   3.  Please refer to same day MRI for further details regarding the  patient's known rectal mass.  4. Small volume ascites.    CTA 5/5/22  IMPRESSION:  1.  No GI bleed identified. No acute vascular abnormality evident.  2.  Bulky innumerable hepatic metastases. Tiny volume of ascites.  3.  Tiny indeterminate pulmonary nodules favored to be metastatic.  4.  Region of circumferential wall thickening at rectosigmoid junction favored to represent primary malignancy given history.  5.  Mid/distal moderate grade small bowel obstruction with the transition point occurring at a group of bowel loops appearing somewhat tethered in the pelvis, very near the colonic malignancy site.    Echo 5/5/22  Interpretation Summary  Global and regional left ventricular function is normal with an EF of 60-65%.  Left ventricular diastolic function is normal.  Global right ventricular function is normal.  The inferior vena cava was normal in size with preserved respiratory  variability.  No significant valvular abnormalities present.    Echo 5/17/22  Interpretation Summary  Global and regional left ventricular function is hyperkinetic with an EF >70%.  Global right ventricular function is normal.  No significant valvular abnormalities present.  The inferior vena cava was normal in size with preserved respiratory  variability.  No pericardial effusion is present.

## 2022-05-17 NOTE — PROGRESS NOTES
Brief SW Note  Writer received page from MD. Pt's family has questions about cremation. Writer spoke with pt's spouse, Dagoberto. Dagoberto reports pt's brother has already completed all arrangements, arranged for a  home and cremation. No further needs at this time.    ________________    HAKAN Leal, [facility]  ED/Observation   [facility]  Phone: [phone]  Pager: [phone]  Fax: [phone]    On-call pager, [phone], 4:00pm to midnight

## 2022-05-17 NOTE — ED TRIAGE NOTES
Patient brought in by [facility]. Vitals 105/70 HR 90.    Patient two weeks ago had a coloscopy and diagnosed with colon and liver cancer    Patient recently had a 4 day adimission at [facility] for low HGB of 4.8 got multiple transfusions and was sent home yesterday    While home patient developed swelling midchest all the way to her feet and SOB. Came from home today due to boyfriend calling EMS. Patient stopped taking her mental health medications 2 days ago.

## 2022-05-17 NOTE — CONSULTS
"  Oncology  Consult Note   Date of Service: 2022    Patient: Demetria Goodrich  MRN: [id number]  Admission Date: 2022  Hospital Day # 0  Cancer Diagnosis: Metastatic rectal adenocarcinoma   Primary Outpatient Oncologist: Dr. Saunders  Current Treatment Plan: Plan was to start FOLFOX     Reason for Consult: 56 yo lady with new dx of metastatic upper rectal adenocarcinoma. Currently admitted with increased lower extremity edema, SOB.     Assessment & Plan:   Demetria Goodrich is a 57 year old female with past medical history of alcohol use, HLD and  recently diagnosed metastatic upper rectal adenocarcinoma. Currently admitted for progressive lower extremity edema and 1 day of SOB.     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 Unfortunately we are unable to treat her with the planned FOLFOX due to hepatorenal failure, and giving chemotherapy would hasten her death. Discussed with patient and she seems to understand but was tired and felt \"out of it\". We told her that we anticipate she will pass away in the hospital within the next few hours to days. Family and patient's long term boyfriend at bedside, all in agreement to focus on comfort (on comfort cares). Code status changed to DNR/DNI.   - Palliative Care consulted    ADDENDUM: patient  at 15:18. Was surrounded by family and her significant other.    # Metastatic Rectal Adenocarcinoma   # Hepatorenal Failure   # Leukocytosis   # Electrolyte Derangements   Patient underwent screening colonoscopy (22) with Dr. Boyle and was found to have a completely obstructing tumor in the proximal rectum extending from 12cm proximal to the anal verge to at least 16cm proximal to the anal verge at which point the lumen is sufficiently narrowed so that neither pediatric colonoscope, nor EGD scope could pass through. Pathology c/w moderately-differentiated adenocarcinoma. CT CAP 22 " with innumerable hepatic metastases. Prominent and mildly enlarged heterogenous peg hepatis LN suspicious for metastatic disease. Multiple bilateral metastatic pulmonary nodules measuring up to 9 mm. She was set up to meet with outpatient oncology team but was admitted to the MICU 5/5-5/7 with GIB c/b hypovolemic shock and SERENITY. Oncology was consulted during the prior admission, seen by Dr. Saunders who recommended starting chemotherapy once stable but pt ultimately left AMA. Patient followed up with Dr. Saunders on 5/16 and plan was to get a port and start FOLFOX. Now admitted with 2 weeks of worsening LE edema and dyspnea. Labs on admission suggest hepatorenal failure secondary to diffuse bulky metastasis.   - Unfortunately, given current hepatorenal failure 2/2 underlying malignancy we are not able to safely treat her with chemotherapy. We feel that give her chemotherapy would cause her more harm than benefit and would hasten her death. Dr. Saunders updated.   - Family at bedside. Discussed poor prognosis and they are in agreement to focus on comfort. On comfort cares as of 5/17. Code statues changed to DNR/DNI; suspect that death will be in time frame of hours to days.        Patient was seen and plan of care was discussed with attending physician Dr. Garcia     I spent 60 minutes face-to-face and/or coordinating or discussing care plan. Over 50% of our time on the unit was spent counseling the patient and/or coordinating care.        Thank you for the opportunity to partake in this patients plan of care. Please do not hesitate to page with questions. We will continue to follow.      Makenzie Velazquez PA-C  Hematology/Oncology  Pager # [phone]   ___________________________________________________________________     Subjective & Interval History:    Patient is endorsing dyspnea and pain throughout both of her legs. Over the past 2 weeks she has developed significant LE edema and weeping, but in the last couple of days  "the swelling has become so bad that she is unable to walk. Also endorses dyspnea and feels \"loopy\". She expressed understanding that her cancer is aggressive and that she has extensive liver involvement. She was hoping to start chemotherapy, but when we expressed concern about her current presentation and inability to treat her cancer given hepatorenal failure, she began to recognize how sick she really is and agrees that she wants to focus on being comfortable. Her boyfriend of 22 years and brother were at bedside, tearful and very supportive, both wanting to focus on keeping her comfortable. I told the patient that she is likely going to die in the next hours to days, and is too ill to go home on hospice - she said \"it's ok, my house is a mess anyway\" and says \"everyone has to die at some point\".     Physical Exam:    Blood pressure 98/68, pulse 94, temperature 97.7  F (36.5  C), temperature source Oral, resp. rate 16, last menstrual period 11/30/2012, SpO2 100 %.  General: acutely ill-appearing, alert and cooperative, lying in bed  HEENT: dry mouth, heard of hearing  Neck: supple, normal ROM  CV:tachycardic, no murmurs  Resp: tachypneic, prolonged expiratory phase  GI: soft, non-tender, non-distended, bowel sounds present and normoactive  MSK: cold feet, turning black/blue, thready distal pulses difficult to palpate due to edema. 3+ LE pitting edema with weeping  Skin: slightly jaundiced   Neuro: No focal deficits, alert, tired     Past Medical History:  Past Medical History:   Diagnosis Date     ASCUS on Pap smear 2010     Cervical high risk HPV (human papillomavirus) test positive 4/1/2022 2005, 2010 ASCUS, neg HR HPV 2008, 2011, 2012 NIL paps 3/13/18 NIL pap, neg HPV 4/1/22 NIL pap, +HR HPV (not 16/18). Plan: cotest in 1 year     Chronic hypomanic disorder (H)      Other and unspecified alcohol dependence, unspecified drinking behavior      Rectal cancer (H) 5/5/2022     Varicose veins of lower extremities " with other complications     both legs       Past Surgical History:  Past Surgical History:   Procedure Laterality Date     COLONOSCOPY N/A 2022    Procedure: COLONOSCOPY, WITH POLYPECTOMY AND BIOPSY;  Surgeon: Everette Fischer MD;  Location: [facility] OR     COLONOSCOPY N/A 2022    Procedure: COLONOSCOPY, FLEXIBLE, WITH LESION REMOVAL USING SNARE;  Surgeon: Everette Fischer MD;  Location: [facility]     COLONOSCOPY WITH CO2 INSUFFLATION N/A 2022    Procedure: COLONOSCOPY, WITH CO2 INSUFFLATION;  Surgeon: Everette Fischer MD;  Location: [facility]     LIGATN/STRIP LONG & SHORT SAPHEN  +    both legs     PICC DOUBLE LUMEN PLACEMENT Right 2022    Right brachial -medial vein 0.45cm.Placement verified by Viky 3CG.PICC okay to use.       Social History:  Social History     Socioeconomic History     Marital status: Single   Tobacco Use     Smoking status: Current Every Day Smoker     Packs/day: 0.07     Years: 33.00     Pack years: 2.31     Types: Other, Cigarettes     Smokeless tobacco: Former User     Quit date: 3/13/2015     Tobacco comment: have Chantex prescription   Substance and Sexual Activity     Alcohol use: Yes     Comment: 12 pack per week     Drug use: No     Sexual activity: Yes     Partners: Male     Birth control/protection: None   Other Topics Concern     Parent/sibling w/ CABG, MI or angioplasty before 65F 55M? No        Family History  Family History   Problem Relation Age of Onset     Cancer Father         Brain tumor, age 32,  age 33     Other Cancer Father         Brain Cancer     Cancer - colorectal Mother         54     Other Cancer Mother         Blood Cancer     Leukemia Mother      Breast Cancer Mother      Colon Cancer Mother      Cancer - colorectal Maternal Uncle         56     Ovarian Cancer No family hx of        Outpatient Medications:  No current facility-administered medications on file prior to encounter.  ARIPiprazole (ABILIFY) 5  MG tablet, Take 1 tablet by mouth daily  [START ON 5/18/2022] dexamethasone (DECADRON) 4 MG tablet, Take 2 tablets (8 mg) by mouth daily Take for 2 days, starting the day after chemo. Take with food. (Patient not taking: Reported on 5/16/2022)  folic acid (FOLVITE) 1 MG tablet, Take 1 tablet (1 mg) by mouth daily (Patient not taking: Reported on 5/16/2022)  multivitamin w/minerals (THERA-VIT-M) tablet, Take 1 tablet by mouth daily (Patient not taking: Reported on 5/16/2022)  [START ON 5/18/2022] ondansetron (ZOFRAN) 8 MG tablet, Take 1 tablet (8 mg) by mouth every 8 hours as needed for nausea (vomiting) (Patient not taking: Reported on 5/16/2022)  pantoprazole (PROTONIX) 40 MG EC tablet, Take 1 tablet (40 mg) by mouth 2 times daily (Patient not taking: Reported on 5/16/2022)  [START ON 5/18/2022] prochlorperazine (COMPAZINE) 10 MG tablet, Take 1 tablet (10 mg) by mouth every 6 hours as needed for nausea or vomiting (Patient not taking: Reported on 5/16/2022)  simvastatin (ZOCOR) 40 MG tablet, Take 1 tablet (40 mg) by mouth At Bedtime  thiamine (B-1) 100 MG tablet, Take 1 tablet (100 mg) by mouth daily (Patient not taking: Reported on 5/16/2022)  [DISCONTINUED] lamoTRIgine (LAMICTAL) 25 MG tablet, Take 1 tablet (25 mg) by mouth daily           Labs & Studies: I personally reviewed the following studies:  ROUTINE LABS (Last four results):  CMP  Recent Labs   Lab 05/17/22  0643 05/17/22  0549 05/17/22  0456 05/17/22  0305 05/12/22  1219   *  --   --  130* 135   POTASSIUM 5.8*  --   --  6.0* 3.3*   CHLORIDE 102  --   --  100 104   CO2 11*  --   --  13* 17*   ANIONGAP 18*  --   --  17* 14   * 170* 77 72 80   BUN 45*  --   --  40* 14   CR 3.18*  --   --  3.10* 1.12*   GFRESTIMATED 17*  --   --  17* 58*   CONNIE 6.9*  --   --  6.8* 8.5   PROTTOTAL 6.8  --   --  6.8 7.2   ALBUMIN 1.7*  --   --  1.4* 1.8*   BILITOTAL 4.4*  3.8*  --   --  3.9* 3.4*   ALKPHOS 1,033*  --   --  1,034* 761*   *  --   --  624*  327*   ALT 88*  --   --  84* 58*     CBC  Recent Labs   Lab 05/17/22  0643 05/17/22  0305 05/12/22  1219   WBC 19.4* 23.1* 25.5*   RBC 2.99* 2.94* 3.15*   HGB 8.4* 8.2* 9.0*   HCT 28.0* 26.2* 28.4*   MCV 94 89 90   MCH 28.1 27.9 28.6   MCHC 30.0* 31.3* 31.7   RDW 23.4* 23.2* 23.7*    197 180     INR  Recent Labs   Lab 05/17/22  0643 05/17/22  0305 05/12/22  1219   INR 2.64* 2.41* 1.46*       IMAGING:

## 2022-05-17 NOTE — PHARMACY-ADMISSION MEDICATION HISTORY
Admission Medication History Completed by Pharmacy    See [facility] Admission Navigator for allergy information, preferred outpatient pharmacy, prior to admission medications and immunization status.     Medication History Sources:     Patient interveiw    Changes made to PTA medication list (reason):    Added: None    Deleted:   o Dexamethasone  o Folic acid  o Protonix  o Compazine  o Thaimine    Changed: None    Additional Information:    Patient states that she only take Abilify and simvastatin at home. The other medications she was discharged with (5/13) and only took two day supply and stopped taking them. She also stated that she was due for an increase in her Abilify from 5 mg to 10 mg 5/18.     Prior to Admission medications    Medication Sig Last Dose Taking? Auth Provider   ARIPiprazole (ABILIFY) 5 MG tablet Take 1 tablet by mouth daily   Reported, Patient   dexamethasone (DECADRON) 4 MG tablet Take 2 tablets (8 mg) by mouth daily Take for 2 days, starting the day after chemo. Take with food.  Patient not taking: Reported on 5/16/2022   Cl Saunders MD   folic acid (FOLVITE) 1 MG tablet Take 1 tablet (1 mg) by mouth daily  Patient not taking: Reported on 5/16/2022   Jordan Hinton MD   multivitamin w/minerals (THERA-VIT-M) tablet Take 1 tablet by mouth daily   Jordan Hinton MD   ondansetron (ZOFRAN) 8 MG tablet Take 1 tablet (8 mg) by mouth every 8 hours as needed for nausea (vomiting)  Patient not taking: Reported on 5/16/2022   Cl Saunders MD   pantoprazole (PROTONIX) 40 MG EC tablet Take 1 tablet (40 mg) by mouth 2 times daily  Patient not taking: Reported on 5/16/2022   Jordan Hinton MD   prochlorperazine (COMPAZINE) 10 MG tablet Take 1 tablet (10 mg) by mouth every 6 hours as needed for nausea or vomiting  Patient not taking: Reported on 5/16/2022   Cl Saunders MD   simvastatin (ZOCOR) 40 MG tablet Take 1 tablet (40 mg) by mouth At Bedtime   Conner Sandoval, RADHA   thiamine (B-1) 100 MG  tablet Take 1 tablet (100 mg) by mouth daily  Patient not taking: Reported on 5/16/2022   Jordan Hinton MD              Date completed: 05/17/22    Medication history completed by: MICHAEL FLORES RP

## 2022-05-17 NOTE — ED PROVIDER NOTES
ED Provider Note  [facility]      History     Chief Complaint   Patient presents with     Groin Swelling     Leg Swelling     HPI  Demetria Goodrich is a 57 year old female with history of newly diagnosed rectal cancer with metastases to the liver and lungs presents with bilateral leg weakness and shortness of breath.  Patient reports bilateral lower extremity edema over the past few weeks and reports that the past few days she has become progressively weaker, and feels as if her muscles are heavy and she is unable to move her legs.  Patient states that today she could not even get up due to the heaviness.  Patient also reports shortness of breath today and reports that it is hard to take deep breaths.  Patient denies any chest pain, cough, no recent illnesses.  Patient denies any fever, chills, nausea, vomiting, abdominal pain, diarrhea.  Patient reports she is supposed to get a port placed tomorrow and start chemotherapy on Thursday.      Past Medical History  Past Medical History:   Diagnosis Date     ASCUS on Pap smear 2010     Cervical high risk HPV (human papillomavirus) test positive 4/1/2022 2005, 2010 ASCUS, neg HR HPV 2008, 2011, 2012 NIL paps 3/13/18 NIL pap, neg HPV 4/1/22 NIL pap, +HR HPV (not 16/18). Plan: cotest in 1 year     Chronic hypomanic disorder (H)      Other and unspecified alcohol dependence, unspecified drinking behavior      Rectal cancer (H) 5/5/2022     Varicose veins of lower extremities with other complications     both legs     Past Surgical History:   Procedure Laterality Date     COLONOSCOPY N/A 04/22/2022    Procedure: COLONOSCOPY, WITH POLYPECTOMY AND BIOPSY;  Surgeon: Evreette Fischer MD;  Location: [facility] OR     COLONOSCOPY N/A 04/22/2022    Procedure: COLONOSCOPY, FLEXIBLE, WITH LESION REMOVAL USING SNARE;  Surgeon: Everette Fischer MD;  Location: [facility] OR     COLONOSCOPY WITH CO2 INSUFFLATION N/A 04/22/2022    Procedure:  COLONOSCOPY, WITH CO2 INSUFFLATION;  Surgeon: Everette Fischer MD;  Location: [facility] OR     LIGATN/STRIP LONG & SHORT SAPHEN  +    both legs     PICC DOUBLE LUMEN PLACEMENT Right 2022    Right brachial -medial vein 0.45cm.Placement verified by Viky 3CG.PICC okay to use.     ARIPiprazole (ABILIFY) 5 MG tablet  [START ON 2022] dexamethasone (DECADRON) 4 MG tablet  folic acid (FOLVITE) 1 MG tablet  multivitamin w/minerals (THERA-VIT-M) tablet  [START ON 2022] ondansetron (ZOFRAN) 8 MG tablet  pantoprazole (PROTONIX) 40 MG EC tablet  [START ON 2022] prochlorperazine (COMPAZINE) 10 MG tablet  simvastatin (ZOCOR) 40 MG tablet  thiamine (B-1) 100 MG tablet      Allergies   Allergen Reactions     No Known Drug Allergies      Family History  Family History   Problem Relation Age of Onset     Cancer Father         Brain tumor, age 32,  age 33     Other Cancer Father         Brain Cancer     Cancer - colorectal Mother         54     Other Cancer Mother         Blood Cancer     Leukemia Mother      Breast Cancer Mother      Colon Cancer Mother      Cancer - colorectal Maternal Uncle         56     Ovarian Cancer No family hx of      Social History   Social History     Tobacco Use     Smoking status: Current Every Day Smoker     Packs/day: 0.07     Years: 33.00     Pack years: 2.31     Types: Other, Cigarettes     Smokeless tobacco: Former User     Quit date: 3/13/2015     Tobacco comment: have Chantex prescription   Substance Use Topics     Alcohol use: Yes     Comment: 12 pack per week     Drug use: No      Past medical history, past surgical history, medications, allergies, family history, and social history were reviewed with the patient. No additional pertinent items.       Review of Systems   Constitutional: Negative for fever.   HENT: Negative for rhinorrhea.    Eyes: Negative for visual disturbance.   Respiratory: Positive for shortness of breath. Negative for cough.     Cardiovascular: Positive for leg swelling. Negative for chest pain.   Gastrointestinal: Negative for abdominal pain, nausea and vomiting.   Endocrine: Negative for polyuria.   Genitourinary: Negative for dysuria.   Musculoskeletal: Negative for back pain.   Skin: Negative for rash.   Allergic/Immunologic: Negative for immunocompromised state.   Neurological: Positive for weakness. Negative for headaches.   Hematological: Bruises/bleeds easily.   Psychiatric/Behavioral: Negative for confusion.     A complete review of systems was performed with pertinent positives and negatives noted in the HPI, and all other systems negative.    Physical Exam   BP: 109/70  Pulse: 94  Temp: 97.7  F (36.5  C)  Resp: 24  SpO2: 99 %  Physical Exam  HENT:      Head: Normocephalic and atraumatic.      Ears:      Comments: Hard of hearing     Nose: Nose normal.      Mouth/Throat:      Mouth: Mucous membranes are dry.   Eyes:      General: Scleral icterus present.      Extraocular Movements: Extraocular movements intact.      Pupils: Pupils are equal, round, and reactive to light.   Cardiovascular:      Rate and Rhythm: Normal rate and regular rhythm.      Heart sounds: Normal heart sounds.   Pulmonary:      Effort: Tachypnea and respiratory distress present.      Breath sounds: Examination of the right-lower field reveals decreased breath sounds. Decreased breath sounds present. No wheezing, rhonchi or rales.   Abdominal:      General: Bowel sounds are normal.      Palpations: Abdomen is rigid.      Tenderness: There is no abdominal tenderness. There is no guarding or rebound.   Musculoskeletal:      Cervical back: Normal range of motion and neck supple.      Right lower leg: 3+ Pitting Edema present.      Left lower leg: 3+ Pitting Edema present.   Skin:     General: Skin is warm.   Neurological:      General: No focal deficit present.      Mental Status: She is alert and oriented to person, place, and time.   Psychiatric:         Mood  and Affect: Mood normal.         Behavior: Behavior normal.        ED Course      Procedures       CRITICAL CARE: 60 minutes exclusive of procedures but including obtaining history, bedside examination, supervision of care, record review and collateral history from other parties, documentation, review/interpretation of imaging and lab results, discussion with patient, family, nursing, ancillary staff, consultants, and admitting service provider.   This patient presented with hyperkalemia, acute kidney injury requiring immediate bedside evaluation and intervention to prevent sudden, clinically significant, or life threatening deterioration in the patient's condition.         Results for orders placed or performed during the hospital encounter of 05/17/22   XR Chest Port 1 View     Status: None    Narrative    EXAM: XR CHEST PORT 1 VIEW  LOCATION: [facility]  DATE/TIME: 5/17/2022 3:16 AM    INDICATION: shortness of breath  COMPARISON: None.      Impression    IMPRESSION: Cardiomediastinal silhouette within normal limits. No focal consolidation or pleural effusion.   US Lower Extremity Venous Duplex Bilateral     Status: None (Preliminary result)    Impression    RESIDENT PRELIMINARY INTERPRETATION  IMPRESSION:.  No deep vein thrombosis in the right or left lower extremity.   Comprehensive metabolic panel     Status: Abnormal   Result Value Ref Range    Sodium 130 (L) 133 - 144 mmol/L    Potassium 6.0 (H) 3.4 - 5.3 mmol/L    Chloride 100 94 - 109 mmol/L    Carbon Dioxide (CO2) 13 (L) 20 - 32 mmol/L    Anion Gap 17 (H) 3 - 14 mmol/L    Urea Nitrogen 40 (H) 7 - 30 mg/dL    Creatinine 3.10 (H) 0.52 - 1.04 mg/dL    Calcium 6.8 (L) 8.5 - 10.1 mg/dL    Glucose 72 70 - 99 mg/dL    Alkaline Phosphatase 1,034 (H) 40 - 150 U/L     (HH) 0 - 45 U/L    ALT 84 (H) 0 - 50 U/L    Protein Total 6.8 6.8 - 8.8 g/dL    Albumin 1.4 (L) 3.4 - 5.0 g/dL    Bilirubin Total 3.9 (H) 0.2 - 1.3  mg/dL    GFR Estimate 17 (L) >60 mL/min/1.73m2   Lipase     Status: Abnormal   Result Value Ref Range    Lipase 420 (H) 73 - 393 U/L   Troponin I     Status: Normal   Result Value Ref Range    Troponin I High Sensitivity 14 <54 ng/L   Nt probnp inpatient (BNP)     Status: Abnormal   Result Value Ref Range    N terminal Pro BNP Inpatient 5,750 (H) 0 - 900 pg/mL   Asymptomatic COVID-19 Virus (Coronavirus) by PCR Nasopharyngeal     Status: Normal    Specimen: Nasopharyngeal; Swab   Result Value Ref Range    SARS CoV2 PCR Negative Negative, Testing sent to reference lab. Results will be returned via unsolicited result    Narrative    Testing was performed using the Xpert Xpress SARS-CoV-2 Assay on the  Cepheid Gene-Xpert Instrument Systems. Additional information about  this Emergency Use Authorization (EUA) assay can be found via the Lab  Guide. This test should be ordered for the detection of SARS-CoV-2 in  individuals who meet SARS-CoV-2 clinical and/or epidemiological  criteria. Test performance is unknown in asymptomatic patients. This  test is for in vitro diagnostic use under the FDA EUA for  laboratories certified under CLIA to perform high complexity testing.  This test has not been FDA cleared or approved. A negative result  does not rule out the presence of PCR inhibitors in the specimen or  target RNA in concentration below the limit of detection for the  assay. The possibility of a false negative should be considered if  the patient's recent exposure or clinical presentation suggests  COVID-19. This test was validated by the [facility]. This laboratory is certified under  the Clinical Laboratory Improvement Amendments of 1988 (CLIA-88) as  qualified to perform high complexity laboratory testing.     INR     Status: Abnormal   Result Value Ref Range    INR 2.41 (H) 0.85 - 1.15   CBC with platelets and differential     Status: Abnormal   Result Value Ref Range     WBC Count 23.1 (H) 4.0 - 11.0 10e3/uL    RBC Count 2.94 (L) 3.80 - 5.20 10e6/uL    Hemoglobin 8.2 (L) 11.7 - 15.7 g/dL    Hematocrit 26.2 (L) 35.0 - 47.0 %    MCV 89 78 - 100 fL    MCH 27.9 26.5 - 33.0 pg    MCHC 31.3 (L) 31.5 - 36.5 g/dL    RDW 23.2 (H) 10.0 - 15.0 %    Platelet Count 197 150 - 450 10e3/uL    % Neutrophils 84 %    % Lymphocytes 8 %    % Monocytes 6 %    % Eosinophils 1 %    % Basophils 0 %    % Immature Granulocytes 1 %    NRBCs per 100 WBC 0 <1 /100    Absolute Neutrophils 19.4 (H) 1.6 - 8.3 10e3/uL    Absolute Lymphocytes 1.9 0.8 - 5.3 10e3/uL    Absolute Monocytes 1.3 0.0 - 1.3 10e3/uL    Absolute Eosinophils 0.2 0.0 - 0.7 10e3/uL    Absolute Basophils 0.1 0.0 - 0.2 10e3/uL    Absolute Immature Granulocytes 0.2 <=0.4 10e3/uL    Absolute NRBCs 0.0 10e3/uL   Extra Tube     Status: None    Narrative    The following orders were created for panel order Extra Tube.  Procedure                               Abnormality         Status                     ---------                               -----------         ------                     Extra Red Top Tube[[id number]]                               Final result                 Please view results for these tests on the individual orders.   Extra Red Top Tube     Status: None   Result Value Ref Range    Hold Specimen [facility]    Glucose by meter     Status: Normal   Result Value Ref Range    GLUCOSE BY METER POCT 77 70 - 99 mg/dL   Glucose by meter     Status: Abnormal   Result Value Ref Range    GLUCOSE BY METER POCT 170 (H) 70 - 99 mg/dL   EKG 12-lead, complete     Status: None (Preliminary result)   Result Value Ref Range    Systolic Blood Pressure  mmHg    Diastolic Blood Pressure  mmHg    Ventricular Rate 88 BPM    Atrial Rate 88 BPM    NV Interval 162 ms    QRS Duration 104 ms     ms    QTc 578 ms    P Axis 62 degrees    R AXIS -37 degrees    T Axis 30 degrees    Interpretation ECG       Sinus rhythm  Left axis deviation  Possible Anterior  infarct , age undetermined  Prolonged QT  Abnormal ECG     Adult Type and Screen     Status: None   Result Value Ref Range    ABO/RH(D) A NEG     Antibody Screen Negative Negative    SPECIMEN EXPIRATION DATE [id number]    CBC with platelets differential     Status: Abnormal    Narrative    The following orders were created for panel order CBC with platelets differential.  Procedure                               Abnormality         Status                     ---------                               -----------         ------                     CBC with platelets and d...[[id number]]  Abnormal            Final result                 Please view results for these tests on the individual orders.   ABO/Rh type and screen     Status: None    Narrative    The following orders were created for panel order ABO/Rh type and screen.  Procedure                               Abnormality         Status                     ---------                               -----------         ------                     Adult Type and Screen[[id number]]                            Final result                 Please view results for these tests on the individual orders.   CBC with platelets differential     Status: None ()    Narrative    The following orders were created for panel order CBC with platelets differential.  Procedure                               Abnormality         Status                     ---------                               -----------         ------                     CBC with platelets and d...[[id number]]                                                   Please view results for these tests on the individual orders.     Medications   glucose gel 15-30 g (has no administration in time range)     Or   dextrose 50 % injection 25-50 mL (has no administration in time range)     Or   glucagon injection 1 mg (has no administration in time range)   dextrose 10% BOLUS (300 mLs Intravenous New Bag 5/17/22 0508)   albuterol  (PROVENTIL) neb solution 10 mg (has no administration in time range)   lidocaine 1 % 0.1-1 mL (has no administration in time range)   lidocaine (LMX4) cream (has no administration in time range)   sodium chloride (PF) 0.9% PF flush 3 mL (3 mLs Intracatheter Not Given 5/17/22 0604)   sodium chloride (PF) 0.9% PF flush 3 mL (has no administration in time range)   melatonin tablet 1 mg (has no administration in time range)   senna-docusate (SENOKOT-S/PERICOLACE) 8.6-50 MG per tablet 1 tablet (has no administration in time range)     Or   senna-docusate (SENOKOT-S/PERICOLACE) 8.6-50 MG per tablet 2 tablet (has no administration in time range)   ARIPiprazole (ABILIFY) tablet 5 mg (has no administration in time range)   dextrose 50 % injection 25 g (25 g Intravenous Given 5/17/22 0512)   insulin regular 1 unit/mL injection 8.12 Units (8.12 Units Intravenous Given 5/17/22 0512)   calcium gluconate 1 g in 50 mL sodium chloride intermittent infusion (premix) (1 g Intravenous Given 5/17/22 0453)   albumin human 25 % injection 12.5 g (0 g Intravenous Stopped 5/17/22 0624)        Assessments & Plan (with Medical Decision Making)   Demetria Goodrich is a 57 year old female with history of newly diagnosed rectal cancer with metastases to the liver and lungs presents with bilateral leg weakness and shortness of breath.  Upon arrival patient is ill-appearing, afebrile, mild distress.  Differential diagnosis includes but is not limited to infectious versus metabolic versus dehydration versus disease progression versus PE versus pneumonia among others.    I reviewed comprehensive labs which are remarkable for leukocytosis with white blood cell count 23.1 (decreased from prior), hemoglobin 8.2, hyperkalemia with potassium of 6, creatinine elevated at 3.1, significant elevation in liver function test, alkaline phosphatase 1034, , ALT 84, bilirubin 3.9 INR 2.41.  Ultrasound of her lower extremities demonstrated no DVT.  A CT PE  could not be performed due to her creatinine however patient recently had a CT which was negative for PE on 5/5/22  Patient with no tachycardia, no hypoxia, negative ultrasound, and elevated INR less likely PE.  I reviewed chest x-ray which is unremarkable with no focal infiltrate or pleural effusion.    Potassium shifted in the emergency department, patient received albumin.  At this time plan on admission to internal medicine team for further evaluation of patient's rectal adenocarcinoma with metastasis to the liver, lungs, acute kidney injury, likely hepatorenal syndrome, hyperkalemia, and peripheral edema.  Patient understands and agrees with plan.      I have reviewed the nursing notes. I have reviewed the findings, diagnosis, plan and need for follow up with the patient.    New Prescriptions    No medications on file       Final diagnoses:   Shortness of breath   Peripheral edema   Rectal adenocarcinoma metastatic to liver (H)   Rectal adenocarcinoma metastatic to lung (H)   Acute kidney injury (H)   Hyperkalemia       --  Kamini Tucker MD  [facility] EMERGENCY DEPARTMENT  5/17/2022     Kamini Tucker MD  05/17/22 0612

## 2022-05-17 NOTE — DISCHARGE SUMMARY
[facility] Death/Discharge Summary    Demetria Goodrich MRN# [id number]   Age: 57 year old YOB: 1965     Date of Admission:  5/17/2022  Date of Discharge::  5/17/2022  5:16 PM  Admitting Physician:  Daquan Kendrick MD  Discharge Physician:  Dr. Savage          Admission Diagnoses:   Hyperkalemia [E87.5]  Acute liver failure  Acute renal failure  Metastatic rectal adenocarcinoma   Coagulopathy secondary to liver failure from metastatic adenocarcinoma          Discharge Diagnosis:   Acute liver failure  Acute renal failure  Lactic acidosis  Rhabdomyolysis  Metastatic rectal adenocarcinoma   Coagulopathy secondary to liver failure from metastatic adenocarcinoma          Procedures:   none          Discharge Medications:   N/A. Passed away on 5/17 15:18          Consultations:   Consultation during this admission received from nephrology and oncology          Brief History of Illness:   Demetria Goodrich is a 57 year old female who has a history of diagnosis of metastatic rectal adenocarcinoma with metastatic disease to liver and likely lungs, alcohol use, and HLD, presenting with acute liver failure from metastatic rectal adenocarcinoma, found to have elevated lactate, ACMA.  Acute liver and renal failure both due to her rapidly progressive metastatic rectal cancer.           Hospital Course:   In April 2022, pt was diagnosed with metastatic rectal adenocarcinoma. During a recent past admission (prior to this one), pt was in the MICU for GI bleed/hypotension, and left AMA prior to chemotherapy initiation with heme/onc.    Patient presented to ED with 2 weeks of lower extremity edema. She had SERENITY with Cr 3.1, AG 18, K 6, LA 7, pro-BNP 5750. Renal stated that it may be due to HRS vs. Cardio-renal syndrome, but no HD recommended. Heme/onc was consulted, and stated that no chemotherapy would be offered given the futility of it and her clinically unstable state. Heme/onc stated that giving  her chemotherapy would cause her more harm than benefit. Had GOC discussion with patient's family, patient, and heme/onc, and family and patient were both understanding of her poor prognosis (heme/onc predicted hours to days) and agreed to focus on comfort. Transitioned her to DNI/DNR and comfort cares prior to her passing at 15:18. Pt's family wanted cremation for the patient.       This patient was staffed and discussed with Dr. Savage.    Damaris Martinez MD  PGY3   resident  [phone]    Damaris Martinez MD     Physician Attestation   I, Barb Savage MD, saw and evaluated this patient prior to discharge.  I discussed the patient with the resident/fellow and agree with plan of care as documented in the note.      I personally reviewed vital signs, medications, labs and imaging.    I personally spent 35 minutes on discharge activities.    Barb Savage MD  Date of Service (when I saw the patient): 05/17/22

## 2022-05-17 NOTE — CONSULTS
Nephrology Initial Consult  May 17, 2022      Demetria Goodrich MRN:[id number] YOB: 1965  Date of Admission:5/17/2022  Primary care provider: Conner Sandoval  Requesting physician: Barb Savage,     ASSESSMENT AND RECOMMENDATIONS:   56 yo woman with baseline normal kidney function who unfortunately was recently diagnosed with advanced rectal cancer that is metastatic to her lymph nodes, liver and lungs. Patient was recently hospitalized in the MICU for a GI bleed and hypotension. At that time, she had an SERENITY likely secondary to hypotension/ATN. Her creatinine was only mildly elevated on 5/12 at 1.12.   She presented to the ER overnight with signs and symptoms of fluid overload, liver failure and acute kidney injury. Patient has not had any documented hypotension since coming to the ER and denies any symptoms suggestive of hypotension at home.   Differential includes cardiorenal syndrome, nephrotic syndrome secondary to her cancer, hepatorenal syndrome secondary to liver dysfunction due to her cancer.   Patient is fluid overloaded and her creatinine is rising. Mild hyerkalemia. Would treat fluid overload and hyperkalemia with a diuretic challenge. No acute indication for dialysis. Per Heme/Onc note, patient's condition is grave and prognosis very poor.     - Lasix 40 mg IV x1   - Urine protein, protein:creatinine ratio  - strict I&O  - repeat BMP this afternoon  - will hold on renal and liver ultrasound at this time pending ongoing discussion by primary team with patient and family about code status.     Recommendations were communicated to primary team via telephone    Seen and discussed with Dr. Ed Espinoza MD   Division of Renal Disease and Hypertension  [facility]  [employer]  Vocera Web Console      REASON FOR CONSULT: SERENITY    HISTORY OF PRESENT ILLNESS:  Admitting provider and nursing notes reviewed  Demetria Goodrich is a 57 year old recently diagnosed with metastatic  rectal cancer. Patient was recently admitted 5/5-7 with a GIB. Patient states that for the last 2 weeks she has been having progressive LE edema. Last night, patient started to have SOB. No chest pain. Still making urine yesterday.     PAST MEDICAL HISTORY:  Reviewed with patient on 05/17/2022     Past Medical History:   Diagnosis Date     ASCUS on Pap smear 2010     Cervical high risk HPV (human papillomavirus) test positive 4/1/2022 2005, 2010 ASCUS, neg HR HPV 2008, 2011, 2012 NIL paps 3/13/18 NIL pap, neg HPV 4/1/22 NIL pap, +HR HPV (not 16/18). Plan: cotest in 1 year     Chronic hypomanic disorder (H)      Other and unspecified alcohol dependence, unspecified drinking behavior      Rectal cancer (H) 5/5/2022     Varicose veins of lower extremities with other complications     both legs       Past Surgical History:   Procedure Laterality Date     COLONOSCOPY N/A 04/22/2022    Procedure: COLONOSCOPY, WITH POLYPECTOMY AND BIOPSY;  Surgeon: Everette Fischer MD;  Location: [facility] OR     COLONOSCOPY N/A 04/22/2022    Procedure: COLONOSCOPY, FLEXIBLE, WITH LESION REMOVAL USING SNARE;  Surgeon: Everette Fischer MD;  Location: [facility] OR     COLONOSCOPY WITH CO2 INSUFFLATION N/A 04/22/2022    Procedure: COLONOSCOPY, WITH CO2 INSUFFLATION;  Surgeon: Everette Fischer MD;  Location: [facility] OR     LIGATN/STRIP LONG & SHORT SAPHEN  1990+1995    both legs     PICC DOUBLE LUMEN PLACEMENT Right 05/05/2022    Right brachial -medial vein 0.45cm.Placement verified by [facility] 3CG.PICC okay to use.        MEDICATIONS:  PTA Meds  Prior to Admission medications    Medication Sig Last Dose Taking? Auth Provider   ARIPiprazole (ABILIFY) 5 MG tablet Take 1 tablet by mouth daily   Reported, Patient   dexamethasone (DECADRON) 4 MG tablet Take 2 tablets (8 mg) by mouth daily Take for 2 days, starting the day after chemo. Take with food.  Patient not taking: Reported on 5/16/2022   Cl Saunders MD   folic acid  (FOLVITE) 1 MG tablet Take 1 tablet (1 mg) by mouth daily  Patient not taking: Reported on 5/16/2022   Jordan Hinton MD   multivitamin w/minerals (THERA-VIT-M) tablet Take 1 tablet by mouth daily   Jordan Hinton MD   ondansetron (ZOFRAN) 8 MG tablet Take 1 tablet (8 mg) by mouth every 8 hours as needed for nausea (vomiting)  Patient not taking: Reported on 5/16/2022   Cl Saunders MD   pantoprazole (PROTONIX) 40 MG EC tablet Take 1 tablet (40 mg) by mouth 2 times daily  Patient not taking: Reported on 5/16/2022   Jordan Hinton MD   prochlorperazine (COMPAZINE) 10 MG tablet Take 1 tablet (10 mg) by mouth every 6 hours as needed for nausea or vomiting  Patient not taking: Reported on 5/16/2022   Cl Saunders MD   simvastatin (ZOCOR) 40 MG tablet Take 1 tablet (40 mg) by mouth At Bedtime   Conner Sandoval PA-C   thiamine (B-1) 100 MG tablet Take 1 tablet (100 mg) by mouth daily  Patient not taking: Reported on 5/16/2022   Jordan Hinton MD   lamoTRIgine (LAMICTAL) 25 MG tablet Take 1 tablet (25 mg) by mouth daily   Conner Sandoval PA-C      Current Meds    albumin human  75 g Intravenous Once     albuterol  10 mg Nebulization Once     ARIPiprazole  5 mg Oral Daily     sodium chloride (PF)  3 mL Intracatheter Q8H     Infusion Meds      ALLERGIES:    Allergies   Allergen Reactions     No Known Drug Allergies        REVIEW OF SYSTEMS:  A comprehensive of systems was negative except as noted above.    SOCIAL HISTORY:   Social History     Socioeconomic History     Marital status: Single     Spouse name: Not on file     Number of children: Not on file     Years of education: Not on file     Highest education level: Not on file   Occupational History     Not on file   Tobacco Use     Smoking status: Current Every Day Smoker     Packs/day: 0.07     Years: 33.00     Pack years: 2.31     Types: Other, Cigarettes     Smokeless tobacco: Former User     Quit date: 3/13/2015     Tobacco comment: rosalba Roman  prescription   Substance and Sexual Activity     Alcohol use: Yes     Comment: 12 pack per week     Drug use: No     Sexual activity: Yes     Partners: Male     Birth control/protection: None   Other Topics Concern     Parent/sibling w/ CABG, MI or angioplasty before 65F 55M? No   Social History Narrative     Not on file     Social Determinants of Health     Financial Resource Strain: Not on file   Food Insecurity: Not on file   Transportation Needs: Not on file   Physical Activity: Not on file   Stress: Not on file   Social Connections: Not on file   Intimate Partner Violence: Not on file   Housing Stability: Not on file     Reviewed with patient     FAMILY MEDICAL HISTORY:   Family History   Problem Relation Age of Onset     Cancer Father         Brain tumor, age 32,  age 33     Other Cancer Father         Brain Cancer     Cancer - colorectal Mother         54     Other Cancer Mother         Blood Cancer     Leukemia Mother      Breast Cancer Mother      Colon Cancer Mother      Cancer - colorectal Maternal Uncle         56     Ovarian Cancer No family hx of      No Family history of kidney disease  Reviewed with patient     PHYSICAL EXAM:   Temp  Av.7  F (36.5  C)  Min: 97.7  F (36.5  C)  Max: 97.7  F (36.5  C)      Pulse  Av.9  Min: 82  Max: 95 Resp  Av  Min: 16  Max: 24  SpO2  Av.3 %  Min: 94 %  Max: 100 %       /75   Pulse 87   Temp 97.7  F (36.5  C) (Oral)   Resp 16   Wt 79.1 kg (174 lb 6.1 oz)   LMP 2012   SpO2 99%   BMI 29.02 kg/m        Admit Weight: 79.1 kg (174 lb 6.1 oz)     GENERAL APPEARANCE: no distress, alert and awake  EYES: + scleral icterus, pupils equal  Lymphatics: no cervical or supraclavicular LAD  Pulmonary: lungs clear to auscultation with equal breath sounds bilaterally, no clubbing  CV: regular rhythm, normal rate, no rub   - JVD 8 cm   - Edema pitting to the hips  GI: soft, nontender, normal bowel sounds, hard liver edge palpable 2-3 cm below  costal margin  MS: no evidence of inflammation in joints, no muscle tenderness  SKIN: no rash, warm, dry, no cyanosis  NEURO: face symmetric, AAOx3, no focal neuro deficits    LABS:   I have reviewed the following labs:  CMP  Recent Labs   Lab 05/17/22  0841 05/17/22  0643 05/17/22  0549 05/17/22  0456 05/17/22  0305 05/12/22  1219   NA  --  131*  --   --  130* 135   POTASSIUM  --  5.8*  --   --  6.0* 3.3*   CHLORIDE  --  102  --   --  100 104   CO2  --  11*  --   --  13* 17*   ANIONGAP  --  18*  --   --  17* 14   * 108* 170* 77 72 80   BUN  --  45*  --   --  40* 14   CR  --  3.18*  --   --  3.10* 1.12*   GFRESTIMATED  --  17*  --   --  17* 58*   CONNIE  --  6.9*  --   --  6.8* 8.5   MAG  --  2.5*  --   --   --   --    PHOS  --  8.9*  --   --   --   --    PROTTOTAL  --  6.8  --   --  6.8 7.2   ALBUMIN  --  1.7*  --   --  1.4* 1.8*   BILITOTAL  --  4.4*  3.8*  --   --  3.9* 3.4*   ALKPHOS  --  1,033*  --   --  1,034* 761*   AST  --  679*  --   --  624* 327*   ALT  --  88*  --   --  84* 58*     CBC  Recent Labs   Lab 05/17/22  0643 05/17/22  0305 05/12/22  1219   HGB 8.4* 8.2* 9.0*   WBC 19.4* 23.1* 25.5*   RBC 2.99* 2.94* 3.15*   HCT 28.0* 26.2* 28.4*   MCV 94 89 90   MCH 28.1 27.9 28.6   MCHC 30.0* 31.3* 31.7   RDW 23.4* 23.2* 23.7*    197 180     INR  Recent Labs   Lab 05/17/22  0643 05/17/22  0305 05/12/22  1219   INR 2.64* 2.41* 1.46*     ABGNo lab results found in last 7 days.   URINE STUDIES  Recent Labs   Lab Test 05/05/22  1001 04/13/22  1136   COLOR Yellow Yellow   APPEARANCE Clear Clear   URINEGLC Negative Negative   URINEBILI Negative Negative   URINEKETONE Negative Trace*   SG 1.015 >=1.030   UBLD Large* Negative   URINEPH 5.0 5.0   PROTEIN Negative Negative   UROBILINOGEN 0.2 0.2   NITRITE Negative Negative   LEUKEST Trace* Negative   RBCU 10-25*  --    WBCU 5-10*  --      No lab results found.  PTH  No lab results found.  IRON STUDIES  Recent Labs   Lab Test 03/13/18  1250   MARGARET 106        IMAGING:  CXR- minimal pulm edema    Junior Espinoza MD

## 2022-05-17 NOTE — DEATH PRONOUNCEMENT
MD DEATH PRONOUNCEMENT    Called to pronounce Demetria Goodrich dead.    Physical Exam: Unresponsive to noxious stimuli, Spontaneous respirations absent, Breath sounds absent, Heart sounds absent and Corneal blink reflex absent    Patient was pronounced dead at 15:18 PM, May 17, 2022.    Patient passed away from acute liver failure/complications from known metastatic rectal adenocarcinoma.    Active Problems:    Shortness of breath    Hyperkalemia    Peripheral edema    Acute kidney injury (H)    Rectal adenocarcinoma metastatic to liver (H)    Rectal adenocarcinoma metastatic to lung (H)       Infectious disease present?: NO    Communicable disease present? (examples: HIV, chicken pox, TB, Ebola, CJD) :  NO    Multi-drug resistant organism present? (example: MRSA): NO    Please consider an autopsy if any of the following exist:  NO Unexpected or unexplained death during or following any dental, medical, or surgical diagnostic treatment procedures.   NO Death of mother at or up to seven days after delivery.     NO All  and pediatric deaths.     NO Death where the cause is sufficiently obscure to delay completion of the death certificate.   NO Deaths in which autopsy would confirm a suspected illness/condition that would affect surviving family members or recipients of transplanted organs.     The following deaths must be reported to the 's Office:  NO A death that may be due entirely or in part to any factors other than natural disease (recent surgery, recent trauma, suspected abuse/neglect).   NO A death that may be an accident, suicide, or homicide.     NO Any sudden, unexpected death in which there is no prior history of significant heart disease or any other condition associated with sudden death.   NO A death under suspicious, unusual, or unexpected circumstances.    NO Any death which is apparently due to natural causes but in which the  does not have a personal physician familiar with  the patient s medical history, social, or environmental situation or the circumstances of the terminal event.   NO Any death apparently due to Sudden Infant Death Syndrome.     NO Deaths that occur during, in association with, or as consequences of a diagnostic, therapeutic, or anesthetic procedure.   NO Any death in which a fracture of a major bone has occurred within the past (6) six months.   NO A death of persons note seen by their physician within 120 days of demise.     NO Any death in which the  was an inmate of a public institution or was in the custody of Law Enforcement personnel.   NO  All unexpected deaths of children   NO Solid organ donors   NO Unidentified bodies   YES Deaths of persons whose bodies are to be cremated or otherwise disposed of so that the bodies will later be unavailable for examination;   NO Deaths unattended by a physician outside of a licensed healthcare facility or licensed residential hospice program   NO Deaths occurring within 24 hours of arrival to a health care facility if death is unexpected.    NO Deaths associated with the decedent s employment.   NO Deaths attributed to acts of terrorism.   NO Any death in which there is uncertainty as to whether it is a medical examiner s care should be discussed with the medical investigator.        Body disposition: Family wants cremation.          Damaris Martinez MD  PGY3   resident  [phone]

## 2022-05-22 LAB
BACTERIA BLD CULT: NO GROWTH
BACTERIA BLD CULT: NO GROWTH

## 2023-11-06 NOTE — H&P
[facility]    History and Physical - Medicine Service, [facility] TEAM        Date of Admission:  5/17/2022    Assessment & Plan   Demetria Goodrich is a 57 year old female who has a history of new diagnosis of Metastatic rectal adenocarcinoma with metastatic disease to liver and likely lungs, alcohol use, and  HLD.  Patient came to the ED due to 2-week course of progressive lower extremities edema and 1 day course of shortness of breath.      #SERENITY  #HAGMA 2/2 SERENITY   #c/f Cardiorenal syndrome   Patient with new diagnosis of Metastatic rectal adenocarcinoma with metastatic disease to liver and likely lungs.Recently admitted on the MICU on  5/5-5/7 due to Acute anemia 2/2 GI Blood c/b Hypovolemic Shock and SERENITY. Currently presenting with 2-week course of progressive lower extremities edema and weakness associated with 1-day course of shortness of breath. Patient denies chest pain, nausea/vomiting, diarrhea, hematuria, hematochezia, cough or fever. Laboratory work-up remarkable for anion gap 17, creatinine 3.10 (baseline 1.1-1.2), BUN 40, K 6.0, pro BNP 5750. US LE without evidenced of DVT and troponin negative. Patient had TTE on 5/6/22 with EF 60-65%.  Less likely that shortness of breath could be related to acute PE. Pro-BNP could be elevated in the setting of acute kidney injury, but due to one day course of shortness of breath and volume overload concern for possible cardiorenal syndrome. Will order new TTE to assess cardiac function. High Anion Gap Acidosis most likely secondary to Acute Kidney Injury. Currently not clear source of SERENITY if possible secondary to cardiorenal syndrome, less likely secondary to urinary tract infection (patient denies dysuria or increased urinary frequency), will order UA, sodium/osm random urine for further evaluation of SERENITY. Will consult Nephrology for further recommendations to start hemodialysis. Will hold for now IV fluids in the setting  of volume overload.   -Nephrology consulted, appreciate recommendations  -TTE   -Sodium random urine  -Chloride random urine  -Osm random urine   -UA  -CT Abd/Pelv Scan   -Strict I/Os  -Lactic acid ordered    #Transaminitis 2/2 likely secondary to metastatic rectal adenocarcinoma to the liver  #hx of New Dx of Metastatic Rectal Adenocarcinoma with metastasis to liver   Patient with new dx of metastatic rectal adenocarcinoma with metastasis to liver. Patient states mild epigastric abdominal distention without tenderness at palpation. Currently no signs of ascites by physical exam. ALK is mostly in her baseline (900-1400), but AST/ALT are progressively increasing. Would order CT Abd/Pelv scan to evaluate progression of disease. Per chart review oncology evaluated the patient on 5/16 with plan for chemotherapy, will consult Oncology for further evaluation of possible chemo. Will consult GI for further evaluation of transaminitis with c/f possible hepatic shock.   -GI consulted, appreciate recommendations.   -CT Abdomen/Pelvis Scan   -Oncology consulted, appreciate recommendations     #Leukocytosis  Patient with chronic leukocytosis (WBCs 23-26). Currently WBCs 23.1 without signs or symptoms of infection (fever, diarrhea, nausea/vomiting, dysuria). Patient afebrile, non-tachycardic and normotensive. Per chart review, Oncology consider that his is most likely stress than infection. Will continue to monitor with CBC daily.   -CBC daily    #Hyperkalemia  #Hypocalcemia  #Hyponatremia  Currently patient with hyperkalemia most likely secondary to her acute kidney injury. Hyponatremia most likely secondary to volume overload with c/f new HF in the setting of cardiorenal syndrome. Currently EKG with prolonged QT, sinus rhythm, not signs of EKG changes related to hyperkalemia.  Will hold for now IV fluids and monitor BMP daily.  Patient received calcium gluconate+insulin+dextrose in the ED.    -s/p Calcium gluconate  +dextrose+insulin  -BMP daily  -Nephrology consulted, appreciate recommendations     Chronic Problems:  #Bipolar Disorder: Resume PTA Aripiprazole   #HLD: Hold PTA simvastatin       Diet:   NPO   DVT Prophylaxis: Defer to primary team in the AM due to prior episode of GI bleeding and current risk of clotting due to metastatic rectal adenocarcinoma.  Hook Catheter: Not present  Fluids: None  Central Lines: None  Cardiac Monitoring: None  Code Status:  Full Code       Disposition Plan   Expected Discharge:     Patient to be staffed in the AM.    Roland Crump MD   Internal Medicine, PGY1  Medicine Service, [facility]  Securely message with the Vocera Web Console (learn more here)  Text page via [facility] Paging/Directory   Please see signed in provider for up to date coverage information    ______________________________________________________________________    Chief Complaint   Lower extremities edema and Shortness of breath.     History is obtained from the patient    History of Present Illness   Demetria Goodrich is a 57 year old female who has a history of new diagnosis of Metastatic rectal adenocarcinoma with metastatic disease to liver and likely lungs, alcohol use, and  HLD.     Patient recently admitted on the MICU on 5/5/22-5/7/22 acute anemia 2/2 GI blood loss c/b hemorrhagic and hypovolemic shock.Patient left by AMA on 5/7/22    Patient came due to progressive left lower extremities weakness and edema associated with 1-day course of shortness of breath. Lower extremities weakness is associated with pain more in the left leg than right leg. Patient states 2-week course of lower extremities edema that is progressively worst in the last couple of days    At the ED patient was afebrile, normotensive, non tachycardic, on room air with tachypnea (RR 24). Laboratory work-up remarkable for K level of 6.0, Na 130, Cl 100, Anion Gap 17, Cr 3.10, BUN  40, Calcium 6.8, WBCs 23.1, Hgb 8.2, Plt 197, INR 2.41, ALP 1034, , ALT 84, Lipase 420, pro BNP 5750, Troponin 14, and glucose 72. Chest X ray showed no focal consolidation or pleural effusion. US LE showed no deep vein thrombosis in the right or left lower extremity. In the ED patient received calcium gluconate, insulin, and dextrose. Patient was admitted in medicine for further evaluation     Patient denies headache, dizziness, chest pain, abdominal pain, nausea, vomiting,constipation, diarrhea or dysuria.      Review of Systems    The 10 point Review of Systems is negative other than noted in the HPI or here.     Past Medical History    I have reviewed this patient's medical history and updated it with pertinent information if needed.   Past Medical History:   Diagnosis Date     ASCUS on Pap smear 2010     Cervical high risk HPV (human papillomavirus) test positive 4/1/2022 2005, 2010 ASCUS, neg HR HPV 2008, 2011, 2012 NIL paps 3/13/18 NIL pap, neg HPV 4/1/22 NIL pap, +HR HPV (not 16/18). Plan: cotest in 1 year     Chronic hypomanic disorder (H)      Other and unspecified alcohol dependence, unspecified drinking behavior      Rectal cancer (H) 5/5/2022     Varicose veins of lower extremities with other complications     both legs        Past Surgical History   I have reviewed this patient's surgical history and updated it with pertinent information if needed.  Past Surgical History:   Procedure Laterality Date     COLONOSCOPY N/A 04/22/2022    Procedure: COLONOSCOPY, WITH POLYPECTOMY AND BIOPSY;  Surgeon: Everette Fischer MD;  Location: [facility] OR     COLONOSCOPY N/A 04/22/2022    Procedure: COLONOSCOPY, FLEXIBLE, WITH LESION REMOVAL USING SNARE;  Surgeon: Everette Fischer MD;  Location: [facility] OR     COLONOSCOPY WITH CO2 INSUFFLATION N/A 04/22/2022    Procedure: COLONOSCOPY, WITH CO2 INSUFFLATION;  Surgeon: Everette Fischer MD;  Location: [facility] OR     LIGATN/STRIP LONG & SHORT  CAROL  +    both legs     PICC DOUBLE LUMEN PLACEMENT Right 2022    Right brachial -medial vein 0.45cm.Placement verified by Viky 3CG.PICC okay to use.        Social History   I have reviewed this patient's social history and updated it with pertinent information if needed. Demetria Goodrich  reports that she has been smoking other and cigarettes. She has a 2.31 pack-year smoking history. She quit smokeless tobacco use about 7 years ago. She reports current alcohol use. She reports that she does not use drugs.    Family History   I have reviewed this patient's family history and updated it with pertinent information if needed.  Family History   Problem Relation Age of Onset     Cancer Father         Brain tumor, age 32,  age 33     Other Cancer Father         Brain Cancer     Cancer - colorectal Mother         54     Other Cancer Mother         Blood Cancer     Leukemia Mother      Breast Cancer Mother      Colon Cancer Mother      Cancer - colorectal Maternal Uncle         56     Ovarian Cancer No family hx of        Prior to Admission Medications   Prior to Admission Medications   Prescriptions Last Dose Informant Patient Reported? Taking?   ARIPiprazole (ABILIFY) 5 MG tablet   Yes No   Sig: Take 1 tablet by mouth daily   dexamethasone (DECADRON) 4 MG tablet   No No   Sig: Take 2 tablets (8 mg) by mouth daily Take for 2 days, starting the day after chemo. Take with food.   Patient not taking: Reported on 2022   folic acid (FOLVITE) 1 MG tablet   No No   Sig: Take 1 tablet (1 mg) by mouth daily   Patient not taking: Reported on 2022   multivitamin w/minerals (THERA-VIT-M) tablet   No No   Sig: Take 1 tablet by mouth daily   Patient not taking: Reported on 2022   ondansetron (ZOFRAN) 8 MG tablet   No No   Sig: Take 1 tablet (8 mg) by mouth every 8 hours as needed for nausea (vomiting)   Patient not taking: Reported on 2022   pantoprazole (PROTONIX) 40 MG EC tablet   No No    Sig: Take 1 tablet (40 mg) by mouth 2 times daily   Patient not taking: Reported on 5/16/2022   prochlorperazine (COMPAZINE) 10 MG tablet   No No   Sig: Take 1 tablet (10 mg) by mouth every 6 hours as needed for nausea or vomiting   Patient not taking: Reported on 5/16/2022   simvastatin (ZOCOR) 40 MG tablet   No No   Sig: Take 1 tablet (40 mg) by mouth At Bedtime   thiamine (B-1) 100 MG tablet   No No   Sig: Take 1 tablet (100 mg) by mouth daily   Patient not taking: Reported on 5/16/2022      Facility-Administered Medications: None     Allergies   Allergies   Allergen Reactions     No Known Drug Allergies        Physical Exam   Vital Signs: Temp: 97.7  F (36.5  C) Temp src: Oral       Resp: 24   O2 Device: None (Room air)    Weight: 0 lbs 0 oz    General Appearance: NAD, non-toxic appearance, pleasant, interactive  HEENT: Scleral icterus, AC/AT, EOMI  Respiratory: Clear breath sounds bilaterally, no wheezes or crackles, no respiratory distress on room air  Cardiovascular: RRR, no murmurs. JVD +12 cm  GI: Mild distended. NT, no rebound or guarding, +BS. No ascites.   Musculoskeletal: Lower peripheral pitting edema 3+. Tender at palpation in dorsal area of bilateral foot.   Neurologic: Alert and oriented x 3, grossly no focal findings.     Data   Data reviewed today: I reviewed all medications, new labs and imaging results over the last 24 hours.     Recent Labs   Lab 05/17/22  0549 05/17/22  0456 05/17/22  0305 05/12/22  1219   WBC  --   --  23.1* 25.5*   HGB  --   --  8.2* 9.0*   MCV  --   --  89 90   PLT  --   --  197 180   INR  --   --  2.41* 1.46*   NA  --   --  130* 135   POTASSIUM  --   --  6.0* 3.3*   CHLORIDE  --   --  100 104   CO2  --   --  13* 17*   BUN  --   --  40* 14   CR  --   --  3.10* 1.12*   ANIONGAP  --   --  17* 14   CONNIE  --   --  6.8* 8.5   * 77 72 80   ALBUMIN  --   --  1.4* 1.8*   PROTTOTAL  --   --  6.8 7.2   BILITOTAL  --   --  3.9* 3.4*   ALKPHOS  --   --  1,034* 761*   ALT  --    --  84* 58*   AST  --   --  624* 327*   LIPASE  --   --  420*  --        XR CHEST PORT 1 VIEW (5/16/22)                                                    IMPRESSION: Cardiomediastinal silhouette within normal limits. No focal consolidation or pleural effusion.   Advancement-Rotation Flap Text: The defect edges were debeveled with a #15 scalpel blade. Given the location of the defect, shape of the defect and the proximity to free margins an advancement-rotation flap was deemed most appropriate. Using a sterile surgical marker, an appropriate flap was drawn incorporating the defect and placing the expected incisions within the relaxed skin tension lines where possible. The area thus outlined was incised deep to adipose tissue with a #15 scalpel blade. The skin margins were undermined to an appropriate distance in all directions utilizing iris scissors. Following this, the designed flap was carried over into the primary defect and sutured into place.

## (undated) DEVICE — KIT ENDO FIRST STEP DISINFECTANT 200ML W/POUCH EP-4

## (undated) DEVICE — PAD CHUX UNDERPAD 23X24" 7136

## (undated) RX ORDER — ONDANSETRON 2 MG/ML
INJECTION INTRAMUSCULAR; INTRAVENOUS
Status: DISPENSED
Start: 2022-01-01

## (undated) RX ORDER — FENTANYL CITRATE 50 UG/ML
INJECTION, SOLUTION INTRAMUSCULAR; INTRAVENOUS
Status: DISPENSED
Start: 2022-01-01